# Patient Record
Sex: FEMALE | Race: WHITE | NOT HISPANIC OR LATINO | Employment: UNEMPLOYED | URBAN - METROPOLITAN AREA
[De-identification: names, ages, dates, MRNs, and addresses within clinical notes are randomized per-mention and may not be internally consistent; named-entity substitution may affect disease eponyms.]

---

## 2018-03-31 ENCOUNTER — HOSPITAL ENCOUNTER (INPATIENT)
Facility: HOSPITAL | Age: 53
LOS: 7 days | Discharge: HOME | DRG: 082 | End: 2018-04-07
Attending: EMERGENCY MEDICINE | Admitting: ANESTHESIOLOGY
Payer: MEDICAID

## 2018-03-31 DIAGNOSIS — R45.1 AGITATION REQUIRING SEDATION PROTOCOL: ICD-10-CM

## 2018-03-31 DIAGNOSIS — I10 ESSENTIAL HYPERTENSION: ICD-10-CM

## 2018-03-31 DIAGNOSIS — S06.5XAA ACUTE SUBDURAL HEMATOMA: ICD-10-CM

## 2018-03-31 DIAGNOSIS — I10 HTN (HYPERTENSION): ICD-10-CM

## 2018-03-31 DIAGNOSIS — E03.8 OTHER SPECIFIED HYPOTHYROIDISM: ICD-10-CM

## 2018-03-31 DIAGNOSIS — R56.1 SEIZURE AFTER HEAD INJURY: ICD-10-CM

## 2018-03-31 DIAGNOSIS — I62.9 INTRACRANIAL HEMORRHAGE: ICD-10-CM

## 2018-03-31 DIAGNOSIS — F10.10 ALCOHOL ABUSE: ICD-10-CM

## 2018-03-31 DIAGNOSIS — J96.01 ACUTE RESPIRATORY FAILURE WITH HYPOXIA: ICD-10-CM

## 2018-03-31 DIAGNOSIS — E87.1 HYPONATREMIA: ICD-10-CM

## 2018-03-31 DIAGNOSIS — Z97.8 ENDOTRACHEAL TUBE PRESENT: ICD-10-CM

## 2018-03-31 DIAGNOSIS — A41.9 SEPSIS, DUE TO UNSPECIFIED ORGANISM: ICD-10-CM

## 2018-03-31 DIAGNOSIS — G93.5 BRAIN COMPRESSION: ICD-10-CM

## 2018-03-31 DIAGNOSIS — S06.5XAA SUBDURAL HEMATOMA: Primary | ICD-10-CM

## 2018-03-31 DIAGNOSIS — F10.939 ALCOHOL WITHDRAWAL SYNDROME WITH COMPLICATION: ICD-10-CM

## 2018-03-31 DIAGNOSIS — I62.00 SUBDURAL HEMORRHAGE: ICD-10-CM

## 2018-03-31 PROBLEM — D69.6 THROMBOCYTOPENIA: Status: ACTIVE | Noted: 2018-03-31

## 2018-03-31 LAB
ABO + RH BLD: NORMAL
ALBUMIN SERPL BCP-MCNC: 3.9 G/DL
ALP SERPL-CCNC: 89 U/L
ALT SERPL W/O P-5'-P-CCNC: 17 U/L
AMMONIA PLAS-SCNC: 80 UMOL/L
AMPHET+METHAMPHET UR QL: NEGATIVE
AMPHET+METHAMPHET UR QL: NEGATIVE
ANION GAP SERPL CALC-SCNC: 11 MMOL/L
AST SERPL-CCNC: 39 U/L
BACTERIA #/AREA URNS AUTO: NORMAL /HPF
BARBITURATES UR QL SCN>200 NG/ML: NEGATIVE
BARBITURATES UR QL SCN>200 NG/ML: NEGATIVE
BASOPHILS # BLD AUTO: 0.02 K/UL
BASOPHILS NFR BLD: 0.1 %
BENZODIAZ UR QL SCN>200 NG/ML: NORMAL
BENZODIAZ UR QL SCN>200 NG/ML: NORMAL
BILIRUB SERPL-MCNC: 1.2 MG/DL
BILIRUB UR QL STRIP: NEGATIVE
BLD GP AB SCN CELLS X3 SERPL QL: NORMAL
BUN SERPL-MCNC: 2 MG/DL
BZE UR QL SCN: NEGATIVE
BZE UR QL SCN: NEGATIVE
CALCIUM SERPL-MCNC: 8.2 MG/DL
CANNABINOIDS UR QL SCN: NEGATIVE
CANNABINOIDS UR QL SCN: NEGATIVE
CHLORIDE SERPL-SCNC: 76 MMOL/L
CHLORIDE UR-SCNC: 137 MMOL/L
CLARITY UR REFRACT.AUTO: CLEAR
CO2 SERPL-SCNC: 24 MMOL/L
COLOR UR AUTO: ABNORMAL
CORTIS SERPL-MCNC: 25.7 UG/DL
CREAT SERPL-MCNC: 0.6 MG/DL
CREAT UR-MCNC: 19 MG/DL
CREAT UR-MCNC: 19 MG/DL
DIFFERENTIAL METHOD: ABNORMAL
EOSINOPHIL # BLD AUTO: 0 K/UL
EOSINOPHIL NFR BLD: 0 %
ERYTHROCYTE [DISTWIDTH] IN BLOOD BY AUTOMATED COUNT: 11.4 %
EST. GFR  (AFRICAN AMERICAN): >60 ML/MIN/1.73 M^2
EST. GFR  (NON AFRICAN AMERICAN): >60 ML/MIN/1.73 M^2
ETHANOL SERPL-MCNC: <10 MG/DL
ETHANOL UR-MCNC: <10 MG/DL
GLUCOSE SERPL-MCNC: 136 MG/DL
GLUCOSE UR QL STRIP: ABNORMAL
HCT VFR BLD AUTO: 33.8 %
HGB BLD-MCNC: 12.3 G/DL
HGB UR QL STRIP: ABNORMAL
HYALINE CASTS UR QL AUTO: 0 /LPF
IMM GRANULOCYTES # BLD AUTO: 0.1 K/UL
IMM GRANULOCYTES NFR BLD AUTO: 0.7 %
INR PPP: 1
KETONES UR QL STRIP: NEGATIVE
LACTATE SERPL-SCNC: 1.6 MMOL/L
LEUKOCYTE ESTERASE UR QL STRIP: NEGATIVE
LYMPHOCYTES # BLD AUTO: 0.9 K/UL
LYMPHOCYTES NFR BLD: 6.4 %
MAGNESIUM SERPL-MCNC: 2.1 MG/DL
MCH RBC QN AUTO: 29.9 PG
MCHC RBC AUTO-ENTMCNC: 36.4 G/DL
MCV RBC AUTO: 82 FL
METHADONE UR QL SCN>300 NG/ML: NEGATIVE
METHADONE UR QL SCN>300 NG/ML: NEGATIVE
MICROSCOPIC COMMENT: NORMAL
MONOCYTES # BLD AUTO: 1.2 K/UL
MONOCYTES NFR BLD: 8.1 %
NEUTROPHILS # BLD AUTO: 12.4 K/UL
NEUTROPHILS NFR BLD: 84.7 %
NITRITE UR QL STRIP: NEGATIVE
NRBC BLD-RTO: 0 /100 WBC
OPIATES UR QL SCN: NEGATIVE
OPIATES UR QL SCN: NEGATIVE
OSMOLALITY SERPL: 230 MOSM/KG
OSMOLALITY UR: 387 MOSM/KG
PCP UR QL SCN>25 NG/ML: NEGATIVE
PCP UR QL SCN>25 NG/ML: NEGATIVE
PH UR STRIP: 7 [PH] (ref 5–8)
PHENYTOIN SERPL-MCNC: 8.2 UG/ML
PLATELET # BLD AUTO: 176 K/UL
PMV BLD AUTO: 9.7 FL
POCT GLUCOSE: 116 MG/DL (ref 70–110)
POCT GLUCOSE: 129 MG/DL (ref 70–110)
POTASSIUM SERPL-SCNC: 3.1 MMOL/L
PROT SERPL-MCNC: 6.3 G/DL
PROT UR QL STRIP: ABNORMAL
PROTHROMBIN TIME: 10.6 SEC
RBC # BLD AUTO: 4.12 M/UL
RBC #/AREA URNS AUTO: 1 /HPF (ref 0–4)
SODIUM SERPL-SCNC: 108 MMOL/L
SODIUM SERPL-SCNC: 111 MMOL/L
SODIUM SERPL-SCNC: 112 MMOL/L
SODIUM SERPL-SCNC: 112 MMOL/L
SODIUM SERPL-SCNC: 114 MMOL/L
SODIUM UR-SCNC: 102 MMOL/L
SP GR UR STRIP: 1.01 (ref 1–1.03)
SQUAMOUS #/AREA URNS AUTO: 1 /HPF
TOXICOLOGY INFORMATION: NORMAL
TOXICOLOGY INFORMATION: NORMAL
TSH SERPL DL<=0.005 MIU/L-ACNC: 1.69 UIU/ML
URN SPEC COLLECT METH UR: ABNORMAL
UROBILINOGEN UR STRIP-ACNC: NEGATIVE EU/DL
UUN UR-MCNC: 139 MG/DL
VANCOMYCIN TROUGH SERPL-MCNC: <1.1 UG/ML
WBC # BLD AUTO: 14.63 K/UL
WBC #/AREA URNS AUTO: 1 /HPF (ref 0–5)

## 2018-03-31 PROCEDURE — 63600175 PHARM REV CODE 636 W HCPCS: Performed by: ANESTHESIOLOGY

## 2018-03-31 PROCEDURE — 99292 CRITICAL CARE ADDL 30 MIN: CPT | Mod: ,,, | Performed by: ANESTHESIOLOGY

## 2018-03-31 PROCEDURE — 83605 ASSAY OF LACTIC ACID: CPT

## 2018-03-31 PROCEDURE — 83735 ASSAY OF MAGNESIUM: CPT

## 2018-03-31 PROCEDURE — 20000000 HC ICU ROOM

## 2018-03-31 PROCEDURE — 31500 INSERT EMERGENCY AIRWAY: CPT

## 2018-03-31 PROCEDURE — 99291 CRITICAL CARE FIRST HOUR: CPT | Mod: ,,, | Performed by: ANESTHESIOLOGY

## 2018-03-31 PROCEDURE — 36620 INSERTION CATHETER ARTERY: CPT

## 2018-03-31 PROCEDURE — 96376 TX/PRO/DX INJ SAME DRUG ADON: CPT

## 2018-03-31 PROCEDURE — 80171 DRUG SCREEN QUANT GABAPENTIN: CPT

## 2018-03-31 PROCEDURE — 85025 COMPLETE CBC W/AUTO DIFF WBC: CPT

## 2018-03-31 PROCEDURE — 80053 COMPREHEN METABOLIC PANEL: CPT

## 2018-03-31 PROCEDURE — 99285 EMERGENCY DEPT VISIT HI MDM: CPT

## 2018-03-31 PROCEDURE — 96375 TX/PRO/DX INJ NEW DRUG ADDON: CPT

## 2018-03-31 PROCEDURE — 80320 DRUG SCREEN QUANTALCOHOLS: CPT

## 2018-03-31 PROCEDURE — 85610 PROTHROMBIN TIME: CPT

## 2018-03-31 PROCEDURE — 96365 THER/PROPH/DIAG IV INF INIT: CPT

## 2018-03-31 PROCEDURE — 80185 ASSAY OF PHENYTOIN TOTAL: CPT

## 2018-03-31 PROCEDURE — 51702 INSERT TEMP BLADDER CATH: CPT

## 2018-03-31 PROCEDURE — 99291 CRITICAL CARE FIRST HOUR: CPT | Mod: ,,, | Performed by: EMERGENCY MEDICINE

## 2018-03-31 PROCEDURE — 5A1935Z RESPIRATORY VENTILATION, LESS THAN 24 CONSECUTIVE HOURS: ICD-10-PCS | Performed by: ANESTHESIOLOGY

## 2018-03-31 PROCEDURE — 63600175 PHARM REV CODE 636 W HCPCS: Performed by: STUDENT IN AN ORGANIZED HEALTH CARE EDUCATION/TRAINING PROGRAM

## 2018-03-31 PROCEDURE — 80321 ALCOHOLS BIOMARKERS 1OR 2: CPT

## 2018-03-31 PROCEDURE — 80202 ASSAY OF VANCOMYCIN: CPT

## 2018-03-31 PROCEDURE — 25000003 PHARM REV CODE 250: Performed by: EMERGENCY MEDICINE

## 2018-03-31 PROCEDURE — 82533 TOTAL CORTISOL: CPT

## 2018-03-31 PROCEDURE — 84540 ASSAY OF URINE/UREA-N: CPT

## 2018-03-31 PROCEDURE — 25000003 PHARM REV CODE 250: Performed by: ANESTHESIOLOGY

## 2018-03-31 PROCEDURE — 96367 TX/PROPH/DG ADDL SEQ IV INF: CPT

## 2018-03-31 PROCEDURE — A4216 STERILE WATER/SALINE, 10 ML: HCPCS | Performed by: STUDENT IN AN ORGANIZED HEALTH CARE EDUCATION/TRAINING PROGRAM

## 2018-03-31 PROCEDURE — 99233 SBSQ HOSP IP/OBS HIGH 50: CPT | Mod: ,,, | Performed by: NEUROLOGICAL SURGERY

## 2018-03-31 PROCEDURE — 94761 N-INVAS EAR/PLS OXIMETRY MLT: CPT

## 2018-03-31 PROCEDURE — 25000003 PHARM REV CODE 250: Performed by: PHYSICIAN ASSISTANT

## 2018-03-31 PROCEDURE — 83935 ASSAY OF URINE OSMOLALITY: CPT

## 2018-03-31 PROCEDURE — 80307 DRUG TEST PRSMV CHEM ANLYZR: CPT

## 2018-03-31 PROCEDURE — 36620 INSERTION CATHETER ARTERY: CPT | Mod: ,,, | Performed by: STUDENT IN AN ORGANIZED HEALTH CARE EDUCATION/TRAINING PROGRAM

## 2018-03-31 PROCEDURE — 96366 THER/PROPH/DIAG IV INF ADDON: CPT

## 2018-03-31 PROCEDURE — 81001 URINALYSIS AUTO W/SCOPE: CPT

## 2018-03-31 PROCEDURE — 87040 BLOOD CULTURE FOR BACTERIA: CPT | Mod: 59

## 2018-03-31 PROCEDURE — 25000003 PHARM REV CODE 250: Performed by: STUDENT IN AN ORGANIZED HEALTH CARE EDUCATION/TRAINING PROGRAM

## 2018-03-31 PROCEDURE — 99291 CRITICAL CARE FIRST HOUR: CPT | Mod: 25

## 2018-03-31 PROCEDURE — 86592 SYPHILIS TEST NON-TREP QUAL: CPT

## 2018-03-31 PROCEDURE — 94002 VENT MGMT INPAT INIT DAY: CPT

## 2018-03-31 PROCEDURE — 83930 ASSAY OF BLOOD OSMOLALITY: CPT

## 2018-03-31 PROCEDURE — 86850 RBC ANTIBODY SCREEN: CPT

## 2018-03-31 PROCEDURE — 25000003 PHARM REV CODE 250

## 2018-03-31 PROCEDURE — 27000221 HC OXYGEN, UP TO 24 HOURS

## 2018-03-31 PROCEDURE — 82436 ASSAY OF URINE CHLORIDE: CPT

## 2018-03-31 PROCEDURE — 99900035 HC TECH TIME PER 15 MIN (STAT)

## 2018-03-31 PROCEDURE — C9113 INJ PANTOPRAZOLE SODIUM, VIA: HCPCS | Performed by: STUDENT IN AN ORGANIZED HEALTH CARE EDUCATION/TRAINING PROGRAM

## 2018-03-31 PROCEDURE — 96368 THER/DIAG CONCURRENT INF: CPT

## 2018-03-31 PROCEDURE — 84295 ASSAY OF SERUM SODIUM: CPT | Mod: 91

## 2018-03-31 PROCEDURE — 63600175 PHARM REV CODE 636 W HCPCS: Performed by: EMERGENCY MEDICINE

## 2018-03-31 PROCEDURE — 84295 ASSAY OF SERUM SODIUM: CPT

## 2018-03-31 PROCEDURE — 63600175 PHARM REV CODE 636 W HCPCS: Performed by: PHYSICIAN ASSISTANT

## 2018-03-31 PROCEDURE — 82108 ASSAY OF ALUMINUM: CPT

## 2018-03-31 PROCEDURE — 84300 ASSAY OF URINE SODIUM: CPT

## 2018-03-31 PROCEDURE — 84443 ASSAY THYROID STIM HORMONE: CPT

## 2018-03-31 PROCEDURE — 63600175 PHARM REV CODE 636 W HCPCS: Performed by: NURSE PRACTITIONER

## 2018-03-31 PROCEDURE — 82140 ASSAY OF AMMONIA: CPT

## 2018-03-31 RX ORDER — FAMOTIDINE 10 MG/ML
20 INJECTION INTRAVENOUS 2 TIMES DAILY
Status: DISCONTINUED | OUTPATIENT
Start: 2018-03-31 | End: 2018-03-31

## 2018-03-31 RX ORDER — MIDAZOLAM HYDROCHLORIDE 1 MG/ML
INJECTION INTRAMUSCULAR; INTRAVENOUS
Status: DISPENSED
Start: 2018-03-31 | End: 2018-04-01

## 2018-03-31 RX ORDER — DEXMEDETOMIDINE HYDROCHLORIDE 4 UG/ML
0.2 INJECTION, SOLUTION INTRAVENOUS CONTINUOUS
Status: DISCONTINUED | OUTPATIENT
Start: 2018-03-31 | End: 2018-03-31

## 2018-03-31 RX ORDER — SODIUM CHLORIDE 9 MG/ML
20 INJECTION, SOLUTION INTRAVENOUS CONTINUOUS
Status: DISCONTINUED | OUTPATIENT
Start: 2018-03-31 | End: 2018-04-01

## 2018-03-31 RX ORDER — LEVETIRACETAM 5 MG/ML
500 INJECTION INTRAVASCULAR EVERY 12 HOURS
Status: DISCONTINUED | OUTPATIENT
Start: 2018-03-31 | End: 2018-04-02

## 2018-03-31 RX ORDER — FENTANYL CITRATE 50 UG/ML
25 INJECTION, SOLUTION INTRAMUSCULAR; INTRAVENOUS EVERY 30 MIN PRN
Status: COMPLETED | OUTPATIENT
Start: 2018-03-31 | End: 2018-04-01

## 2018-03-31 RX ORDER — SODIUM CHLORIDE 0.9 % (FLUSH) 0.9 %
3 SYRINGE (ML) INJECTION EVERY 8 HOURS
Status: DISCONTINUED | OUTPATIENT
Start: 2018-03-31 | End: 2018-04-07 | Stop reason: HOSPADM

## 2018-03-31 RX ORDER — VANCOMYCIN/0.9 % SOD CHLORIDE 1 G/100 ML
1000 PLASTIC BAG, INJECTION (ML) INTRAVENOUS
Status: DISCONTINUED | OUTPATIENT
Start: 2018-03-31 | End: 2018-04-01

## 2018-03-31 RX ORDER — PROMETHAZINE HYDROCHLORIDE 25 MG/ML
6.25 INJECTION, SOLUTION INTRAMUSCULAR; INTRAVENOUS ONCE
Status: DISCONTINUED | OUTPATIENT
Start: 2018-03-31 | End: 2018-03-31

## 2018-03-31 RX ORDER — MIDAZOLAM HYDROCHLORIDE 1 MG/ML
2 INJECTION INTRAMUSCULAR; INTRAVENOUS
Status: DISCONTINUED | OUTPATIENT
Start: 2018-03-31 | End: 2018-03-31

## 2018-03-31 RX ORDER — NICARDIPINE HYDROCHLORIDE 0.2 MG/ML
INJECTION INTRAVENOUS
Status: COMPLETED
Start: 2018-03-31 | End: 2018-03-31

## 2018-03-31 RX ORDER — NICARDIPINE HYDROCHLORIDE 0.2 MG/ML
INJECTION INTRAVENOUS
Status: DISPENSED
Start: 2018-03-31 | End: 2018-04-01

## 2018-03-31 RX ORDER — DEXMEDETOMIDINE HYDROCHLORIDE 4 UG/ML
INJECTION, SOLUTION INTRAVENOUS
Status: DISPENSED
Start: 2018-03-31 | End: 2018-04-01

## 2018-03-31 RX ORDER — IBUPROFEN 200 MG
1 TABLET ORAL DAILY
Status: DISCONTINUED | OUTPATIENT
Start: 2018-04-01 | End: 2018-04-07 | Stop reason: HOSPADM

## 2018-03-31 RX ORDER — PROPOFOL 10 MG/ML
INJECTION, EMULSION INTRAVENOUS
Status: DISPENSED
Start: 2018-03-31 | End: 2018-03-31

## 2018-03-31 RX ORDER — PROPOFOL 10 MG/ML
5 INJECTION, EMULSION INTRAVENOUS CONTINUOUS
Status: DISCONTINUED | OUTPATIENT
Start: 2018-03-31 | End: 2018-03-31

## 2018-03-31 RX ORDER — FENTANYL CITRATE 50 UG/ML
50 INJECTION, SOLUTION INTRAMUSCULAR; INTRAVENOUS ONCE
Status: COMPLETED | OUTPATIENT
Start: 2018-03-31 | End: 2018-03-31

## 2018-03-31 RX ORDER — CHLORHEXIDINE GLUCONATE ORAL RINSE 1.2 MG/ML
15 SOLUTION DENTAL 2 TIMES DAILY
Status: DISCONTINUED | OUTPATIENT
Start: 2018-03-31 | End: 2018-04-02

## 2018-03-31 RX ORDER — FENTANYL CITRATE 50 UG/ML
25 INJECTION, SOLUTION INTRAMUSCULAR; INTRAVENOUS
Status: DISCONTINUED | OUTPATIENT
Start: 2018-03-31 | End: 2018-03-31

## 2018-03-31 RX ORDER — CEFTRIAXONE 1 G/1
1 INJECTION, POWDER, FOR SOLUTION INTRAMUSCULAR; INTRAVENOUS
Status: DISCONTINUED | OUTPATIENT
Start: 2018-03-31 | End: 2018-04-02

## 2018-03-31 RX ORDER — MIDAZOLAM HYDROCHLORIDE 1 MG/ML
4 INJECTION INTRAMUSCULAR; INTRAVENOUS ONCE
Status: COMPLETED | OUTPATIENT
Start: 2018-03-31 | End: 2018-03-31

## 2018-03-31 RX ORDER — FENTANYL CITRATE 50 UG/ML
50 INJECTION, SOLUTION INTRAMUSCULAR; INTRAVENOUS
Status: COMPLETED | OUTPATIENT
Start: 2018-03-31 | End: 2018-03-31

## 2018-03-31 RX ORDER — NICARDIPINE HYDROCHLORIDE 0.2 MG/ML
2.5 INJECTION INTRAVENOUS CONTINUOUS
Status: DISCONTINUED | OUTPATIENT
Start: 2018-03-31 | End: 2018-04-01

## 2018-03-31 RX ORDER — PANTOPRAZOLE SODIUM 40 MG/10ML
40 INJECTION, POWDER, LYOPHILIZED, FOR SOLUTION INTRAVENOUS 2 TIMES DAILY
Status: DISCONTINUED | OUTPATIENT
Start: 2018-03-31 | End: 2018-03-31

## 2018-03-31 RX ADMIN — Medication 3 ML: at 10:03

## 2018-03-31 RX ADMIN — FENTANYL CITRATE 50 MCG: 50 INJECTION, SOLUTION INTRAMUSCULAR; INTRAVENOUS at 11:03

## 2018-03-31 RX ADMIN — CHLORHEXIDINE GLUCONATE 15 ML: 1.2 RINSE ORAL at 10:03

## 2018-03-31 RX ADMIN — NICARDIPINE HYDROCHLORIDE 2.5 MG/HR: 0.2 INJECTION INTRAVENOUS at 10:03

## 2018-03-31 RX ADMIN — NICARDIPINE HYDROCHLORIDE 2.5 MG/HR: 0.2 INJECTION, SOLUTION INTRAVENOUS at 10:03

## 2018-03-31 RX ADMIN — FENTANYL CITRATE 25 MCG: 50 INJECTION, SOLUTION INTRAMUSCULAR; INTRAVENOUS at 07:03

## 2018-03-31 RX ADMIN — FOLIC ACID: 5 INJECTION, SOLUTION INTRAMUSCULAR; INTRAVENOUS; SUBCUTANEOUS at 01:03

## 2018-03-31 RX ADMIN — MIDAZOLAM HYDROCHLORIDE 4 MG: 1 INJECTION, SOLUTION INTRAMUSCULAR; INTRAVENOUS at 01:03

## 2018-03-31 RX ADMIN — PROPOFOL 5 MCG/KG/MIN: 10 INJECTION, EMULSION INTRAVENOUS at 10:03

## 2018-03-31 RX ADMIN — PROPOFOL 2.5 MCG/KG/MIN: 10 INJECTION, EMULSION INTRAVENOUS at 10:03

## 2018-03-31 RX ADMIN — LEVETIRACETAM 500 MG: 5 INJECTION INTRAVENOUS at 03:03

## 2018-03-31 RX ADMIN — PANTOPRAZOLE SODIUM 40 MG: 40 INJECTION, POWDER, FOR SOLUTION INTRAVENOUS at 10:03

## 2018-03-31 RX ADMIN — LEVETIRACETAM 500 MG: 5 INJECTION INTRAVENOUS at 10:03

## 2018-03-31 RX ADMIN — SODIUM CHLORIDE 1220 ML: 0.9 INJECTION, SOLUTION INTRAVENOUS at 10:03

## 2018-03-31 RX ADMIN — DEXMEDETOMIDINE HYDROCHLORIDE 0.2 MCG/KG/HR: 4 INJECTION, SOLUTION INTRAVENOUS at 01:03

## 2018-03-31 RX ADMIN — PROMETHAZINE HYDROCHLORIDE 6.25 MG: 25 INJECTION INTRAMUSCULAR; INTRAVENOUS at 10:03

## 2018-03-31 RX ADMIN — SODIUM CHLORIDE 1000 ML: 0.9 INJECTION, SOLUTION INTRAVENOUS at 10:03

## 2018-03-31 NOTE — PROGRESS NOTES
Patient successfully extubated per order from ANGI Wolfe RT at bedside. Patient tolerated extubation well and currently satting 95% on 2L O2 NC.  Will continue to monitor.

## 2018-03-31 NOTE — ED NOTES
Critical Sodium (111) reported to critical care admitting team and JACKSON Lopez at bedside/ states they were already aware of critical sodium

## 2018-03-31 NOTE — PROGRESS NOTES
Pt extubated without parameters per MD order.  Pt placed on 3L nasal cannula.  Pt tolerated well.  Will continue to monitor.

## 2018-03-31 NOTE — SUBJECTIVE & OBJECTIVE
Past Medical History:   Diagnosis Date    Anxiety     Depression     History of psychiatric care     Hypertension     Psychiatric exam     Seizures      Past Surgical History:   Procedure Laterality Date    BACK SURGERY      TONSILLECTOMY        No current facility-administered medications on file prior to encounter.      Current Outpatient Prescriptions on File Prior to Encounter   Medication Sig Dispense Refill    citalopram (CELEXA) 20 MG tablet Take 1 tablet (20 mg total) by mouth once daily. 30 tablet 1    gabapentin (NEURONTIN) 300 MG capsule Take 2 capsules (600 mg total) by mouth 3 (three) times daily. 180 capsule 1    levothyroxine (SYNTHROID) 75 MCG tablet Take 75 mcg by mouth once daily.      liothyronine (CYTOMEL) 25 MCG Tab Take 1 tablet (25 mcg total) by mouth every morning. 30 tablet 1    lisinopril 10 MG tablet Take 1 tablet (10 mg total) by mouth once daily. 30 tablet 1    metoprolol tartrate (LOPRESSOR) 25 MG tablet Take 25 mg by mouth 2 (two) times daily.      mirtazapine (REMERON) 30 MG tablet Take 1 tablet (30 mg total) by mouth every evening. 30 tablet 1    phenytoin (DILANTIN) 100 MG ER capsule Take by mouth 3 (three) times daily.        Allergies: Patient has no known allergies.    History reviewed. No pertinent family history.  Social History   Substance Use Topics    Smoking status: Current Every Day Smoker     Packs/day: 1.00     Years: 10.00    Smokeless tobacco: Not on file    Alcohol use 0.0 oz/week     5 - 10 Cans of beer per week     Review of Systems   Unable to perform ROS: Intubated     Objective:     Vitals:    Pulse: 107  BP: 123/84  MAP (mmHg): 97  Resp: 12  SpO2: 100 %  O2 Device (Oxygen Therapy): ventilator  Vent Mode: A/C  Set Rate: 14 bmp  Vt Set: 450 mL  PEEP/CPAP: 5 cmH20  Peak Airway Pressure: 15 cmH2O  Mean Airway Pressure: 7 cmH20    Pulse  Min: 80  Max: 130  BP  Min: 119/79  Max: 197/93  MAP (mmHg)  Min: 93  Max: 134  Resp  Min: 12  Max: 12  SpO2   Min: 100 %  Max: 100 %    No intake/output data recorded.           Physical Exam   Constitutional: She appears well-developed and well-nourished. She appears distressed. She is sedated and intubated.   HENT:   Head: Normocephalic and atraumatic.   Eyes: Conjunctivae and EOM are normal. Pupils are equal, round, and reactive to light.   Neck: Normal range of motion. Neck supple. No JVD present. No thyromegaly present.   Cardiovascular: Normal rate, regular rhythm, S1 normal and S2 normal.  Exam reveals no gallop and no friction rub.    No murmur heard.  Pulses:       Radial pulses are 2+ on the right side, and 2+ on the left side.   Pulmonary/Chest: Effort normal. She is intubated. She has no wheezes. She has no rhonchi. She has no rales.   Abdominal: Soft. Bowel sounds are normal. She exhibits no distension. There is no tenderness.   Musculoskeletal: Normal range of motion. She exhibits no edema or tenderness.   Neurological: She has normal reflexes.   Sedation weaned prior to exam.    GCS 10T. Follows commands. No obvious cranial nerve deficits. Moves all four extremities spontaneously and on command.     Pupils 4mm and sluggish.       Skin: Skin is warm and dry.   Nursing note and vitals reviewed.    Unable to test orientation, language, memory, judgment, insight, fund of knowledge, tongue protrusion, coordination, gait due to level of consciousness.    Today I personally reviewed pertinent medications, imaging, lab results, notably:

## 2018-03-31 NOTE — NURSING TRANSFER
Nursing Transfer Note      3/31/2018     Transfer From: [facility] >> [id number]    Transfer via stretcher    Transfer with cardiac monitoring, vented    Transported by Aliya Segal RN    Medicines sent: Yes    Chart send with patient: Yes    Notified: N/A at this time    Patient reassessed at: 3/31/2018 1730    Upon arrival to floor: cardiac monitor applied, patient oriented to room, call bell in reach and bed in lowest position, wheels locked, NCC and RT notified of patient's arrival

## 2018-03-31 NOTE — ASSESSMENT & PLAN NOTE
· May be cause of seizures given extremely low value (109) at OSH  · Follow up labs  · q2h sodium checks  · Currently on NS 75cc/hr. Titrate fluids to effect.  · Goal sodium no greater than 115 today.  · Patient reported alcoholic preferring beer as well, may be dilutional due to excess beer intake.

## 2018-03-31 NOTE — CONSULTS
[facility]  Neurosurgery  Consult Note    Consults  Subjective:     Chief Complaint/Reason for Admission: SDH    History of Present Illness: Patient is a 53 year old female with a history of HTN and seizures, noncompliant on Dilantin who presents today with SDH. Reportedly not on anticoagulation. Patient hyponatremic to 110 on arrival.       (Not in a hospital admission)    Review of patient's allergies indicates:  No Known Allergies    Past Medical History:   Diagnosis Date    Anxiety     Depression     History of psychiatric care     Hypertension     Psychiatric exam     Seizures      Past Surgical History:   Procedure Laterality Date    BACK SURGERY      TONSILLECTOMY       Family History     None        Social History Main Topics    Smoking status: Current Every Day Smoker     Packs/day: 1.00     Years: 10.00    Smokeless tobacco: Not on file    Alcohol use 0.0 oz/week     5 - 10 Cans of beer per week    Drug use: No    Sexual activity: No     Review of Systems   Unable to perform ROS: Intubated     Objective:     Weight: 61 kg (134 lb 7.7 oz)  Body mass index is 22.38 kg/m².  Vital Signs (Most Recent):  Pulse: (!) 130 (03/31/18 1108)  Resp: 12 (03/31/18 1035)  BP: (!) 162/87 (03/31/18 1108)  SpO2: 100 % (03/31/18 1108) Vital Signs (24h Range):  Pulse:  [] 130  Resp:  [12] 12  SpO2:  [100 %] 100 %  BP: (154-197)/() 162/87          Vent Mode: A/C  Resp Rate Total:  [29 br/min] 29 br/min  Vt Set:  [450 mL] 450 mL  PEEP/CPAP:  [5 cmH20] 5 cmH20  Mean Airway Pressure:  [7 cmH20] 7 cmH20         Urethral Catheter 03/31/18 1100 (Active)       Neurosurgery Physical Exam     Intubated, awake, alert.  Nods to questioning.  PERRL  Following commands x4.  Abdomen soft.    Significant Labs:  No results for input(s): GLU, NA, K, CL, CO2, BUN, CREATININE, CALCIUM, MG in the last 48 hours.    Recent Labs  Lab 03/31/18  1046   WBC 14.63*   HGB 12.3   HCT 33.8*          Recent  Labs  Lab 03/31/18  1049   INR 1.0     Microbiology Results (last 7 days)     ** No results found for the last 168 hours. **        All pertinent labs from the last 24 hours have been reviewed.    Significant Diagnostics:  I have reviewed all pertinent imaging results/findings within the past 24 hours.    Assessment/Plan:     Acute subdural hematoma    53F PMH presenting with R acute SDH. GCS [id number]    -- Admit to neuro ICU, NCC service.  -- No acute neurosurgical intervention necessary.  -- Will monitor closely for need to evac SDH.  -- Correct hyponatremia per NCC.  -- SBP <160.  -- HOB >30.  -- WTE as tolerated.  -- Neurochecks q1h, call NSGY immediately with decline in exam.  -- Medical management per NCC.     Discussed with Dr Morataya.             Thank you for your consult. I will follow-up with patient. Please contact us if you have any additional questions.    Alen Jain MD  Neurosurgery  [facility]

## 2018-03-31 NOTE — ED TRIAGE NOTES
Caridad CANELO Ortiz, a 53 y.o. female presents to the ED transfer from [facility] for SD post fall from seizure this am @7453      Chief Complaint   Patient presents with    Transfer [facility]     Transfer from [facility] with subdural bleed from seizure this am @ 445. Pt arrived intubated and sedated.      Review of patient's allergies indicates:  No Known Allergies  Past Medical History:   Diagnosis Date    Anxiety     Depression     History of psychiatric care     Hypertension     Psychiatric exam     Seizures

## 2018-03-31 NOTE — HPI
Caridad Ortiz is a 53 y.o. female with PHMx EtOH abuse, depression, hypothyroidism who presents as transfer from [facility] in [facility] for witnessed seizure. She was provided with versed and transported here when workup noted an ICH. In addition she may have a history of seizures based on documentation from OSH and a negative dilantin level was drawn but no dilantin or other AEDs were found in home medications sent along with her. On arrival NSGY was consulted emergently and are currently evaluating the patient.    Of note the patients medications have a bottle of gabapentin and a bottle of tizanidine, both filled at the beginning of the month, that are empty.     HPI is based off of recollections from other providers including EMS and ED providers as well as the medical record due to intubation.

## 2018-03-31 NOTE — ASSESSMENT & PLAN NOTE
· Patient has reported history of prior seizure disorder.  · Dilantin level at OSH negative.  · Follow up repeat  · Will start keppra 500mg IV BID for seizure ppx  · Seizure precautions.  · Alcohol withdrawals are also considered with in the differential given a history of drinking.  · Currently on propofol gtt and precedex.  · In addition patient had an empty bottle of tizanadine and an empty bottle of neurontin among her possessions that were filled beginning of this month.

## 2018-03-31 NOTE — ED NOTES
Bed: 02  Expected date: 3/31/18  Expected time: 9:07 AM  Means of arrival:   Comments:  Intubated Head Bleed

## 2018-03-31 NOTE — PROGRESS NOTES
Pt received from transport team, was intubated at outside facility. Pt placed on vent upon arrival. Pt is coughing a lot, will remain unsedated for now to assess neuro status. Will continue to monitor.

## 2018-03-31 NOTE — ASSESSMENT & PLAN NOTE
· First noted on OSH CT AM of 3/31  · Repeat imaging today appears grossly stable.  · NSGY consulted, recommendations appreciated.  · On cardene gtt with a-line with goal SBP <140.  · Admit to NCC  · q1h neuro checks.

## 2018-03-31 NOTE — CONSULTS
Inpatient consult to Physical Medicine Rehab  Consult performed by: SUAD PEDERSEN  Consult ordered by: ROB MORALES II  Reason for consult: assess rehab needs      Reviewed patient history and current admission.  Rehab team following.  Full consult to follow.    KAAMR Aponte, FNP-C  Physical Medicine & Rehabilitation   03/31/2018  Spectralink: [id number]

## 2018-03-31 NOTE — PT/OT/SLP PROGRESS
Speech Language Pathology      Caridad Ortiz  MRN: [id number]   ED 02/02    Patient not seen today secondary to patient intubated. Orders to be discontinued. Please re-consult Speech Therapy upon extubation and when medically appropriate to complete a clinical bedside swallow evaluation and Zidfyk-Womdblda-Oomywxfnm Evaluation.     MACIEL Evans, CCC-SLP   Pager: [phone]  03/31/2018

## 2018-03-31 NOTE — PROGRESS NOTES
"Admit Date: 3/31/2018    Admit Time: 1730    Temp: 99.7 (oral)    CBG:    Weight: 64.7kg (bed scale)    Height: 5'4" (estimated)    JACKSON Wolfe notified of patient arrival and at bedside  RT Nura at bedside       "

## 2018-03-31 NOTE — PROCEDURES
Caridad Ortiz is a 53 y.o. female patient.    Pulse: 98 (03/31/18 1326)  Resp: 12 (03/31/18 1035)  BP: 121/81 (03/31/18 1326)  SpO2: 100 % (03/31/18 1326)  Weight: 61 kg (134 lb 7.7 oz) (03/31/18 1100)       Arterial Line  Date/Time: 3/31/2018 1:51 PM  Performed by: LILY SANTOYO II  Authorized by: LILY SANTOYO II   Preparation: Patient was prepped and draped in the usual sterile fashion.  Indications: multiple ABGs and hemodynamic monitoring  Location: right radial  Anesthesia: see MAR for details  Patient sedated: yes  Sedation type: deep sedation  (See MAR for exact dosages of medications).  Sedatives: midazolam, fentanyl and propofol  Chan's test normal: yes  Needle gauge: 18  Seldinger technique: Seldinger technique used  Number of attempts: 2  Complications: No  Specimens: No  Implants: No  Post-procedure: dressing applied  Post-procedure CMS: normal  Patient tolerance: Patient tolerated the procedure well with no immediate complications          Lily Santoyo II  3/31/2018

## 2018-03-31 NOTE — ED PROVIDER NOTES
Encounter Date: 3/31/2018    SCRIBE #1 NOTE: I, Talita Lake, am scribing for, and in the presence of, Dr. Rg.       History     Chief Complaint   Patient presents with    Transfer [facility]     Transfer from [facility] with subdural bleed from seizure this am @ 445. Pt arrived intubated and sedated.        The patient is a 53 y.o. female with co-morbidities including:HTN, seizures, depression and anxiety that presents to the ED via flight care as a transfer from [facility] for evaluation of a subdural hematoma and hyponatremia. Patient was intubated due to GCS 7 at previous facility and presents on low propofol infusion for sedation.        The history is provided by medical records.     Review of patient's allergies indicates:  No Known Allergies  Past Medical History:   Diagnosis Date    Anxiety     Depression     History of psychiatric care     Hypertension     Psychiatric exam     Seizures      Past Surgical History:   Procedure Laterality Date    BACK SURGERY      TONSILLECTOMY       History reviewed. No pertinent family history.  Social History   Substance Use Topics    Smoking status: Current Every Day Smoker     Packs/day: 1.00     Years: 10.00    Smokeless tobacco: Not on file    Alcohol use 0.0 oz/week     5 - 10 Cans of beer per week     Review of Systems   Unable to perform ROS: Acuity of condition       Physical Exam     Initial Vitals   BP Pulse Resp Temp SpO2   -- -- -- -- --      MAP       --         Physical Exam    Vitals reviewed.  Constitutional: She appears well-developed and well-nourished.   53 y.o.  woman intubated and ventilated.    HENT:   Head: Normocephalic and atraumatic.   No intraoral swelling noted surrounding ET tube.   Eyes:   Pupils are 4 mm and minimally reactive to light.   Neck: No tracheal deviation present.   Cardiovascular: Intact distal pulses.   Mild tachycardia.   Pulmonary/Chest:   Ventilated, clear breath  sounds noted bilaterally.   Abdominal: Soft. She exhibits no distension.   Musculoskeletal:   Exhibits intermittent spontaneous movement of all extremities.    Neurological:   GCS 6 I   Skin: Skin is warm and dry. No rash noted.         ED Course   Critical Care  Date/Time: 3/31/2018 10:33 AM  Performed by: ARIS CORONADO  Authorized by: ARIS CORONADO   Direct patient critical care time: 15 minutes  Additional history critical care time: 5 minutes  Ordering / reviewing critical care time: 10 minutes  Documentation critical care time: 5 minutes  Consulting other physicians critical care time: 15 minutes  Total critical care time (exclusive of procedural time) : 50 minutes  Critical care was necessary to treat or prevent imminent or life-threatening deterioration of the following conditions: CNS failure or compromise and metabolic crisis.  Critical care was time spent personally by me on the following activities: discussions with consultants, interpretation of cardiac output measurements, evaluation of patient's response to treatment, examination of patient, obtaining history from patient or surrogate, ordering and performing treatments and interventions, ordering and review of laboratory studies, ordering and review of radiographic studies, pulse oximetry, re-evaluation of patient's condition, review of old charts and ventilator management.        Labs Reviewed   CBC W/ AUTO DIFFERENTIAL - Abnormal; Notable for the following:        Result Value    WBC 14.63 (*)     Hematocrit 33.8 (*)     MCHC 36.4 (*)     RDW 11.4 (*)     Immature Granulocytes 0.7 (*)     Gran # (ANC) 12.4 (*)     Immature Grans (Abs) 0.10 (*)     Lymph # 0.9 (*)     Mono # 1.2 (*)     Gran% 84.7 (*)     Lymph% 6.4 (*)     All other components within normal limits   COMPREHENSIVE METABOLIC PANEL - Abnormal; Notable for the following:     Sodium 111 (*)     Potassium 3.1 (*)     Chloride 76 (*)     Glucose 136 (*)     BUN, Bld 2 (*)      Calcium 8.2 (*)     Total Bilirubin 1.2 (*)     All other components within normal limits   URINALYSIS - Abnormal; Notable for the following:     Protein, UA 1+ (*)     Glucose, UA 1+ (*)     Occult Blood UA 2+ (*)     All other components within normal limits    Narrative:     1 CUP OF URINE   SODIUM - Abnormal; Notable for the following:     Sodium 108 (*)     All other components within normal limits   PHENYTOIN LEVEL, TOTAL - Abnormal; Notable for the following:     Phenytoin Lvl 8.2 (*)     All other components within normal limits    Narrative:     ADD ON PER MBBS, MORALES II ORDER [id number] TSH & ORDER [id number]   PHENYTOIN @1205 3/31/18   PROTIME-INR   LACTIC ACID, PLASMA   DRUG SCREEN PANEL, URINE EMERGENCY    Narrative:     1 CUP OF URINE   MAGNESIUM   TOXICOLOGY SCREEN, URINE, RANDOM (COMPLIANCE)    Narrative:     1 CUP OF URINE   TSH   PHENYTOIN LEVEL, TOTAL   URINALYSIS MICROSCOPIC    Narrative:     1 CUP OF URINE   TSH    Narrative:     ADD ON PER MBBS, MORALES II ORDER [id number] TSH & ORDER [id number]   PHENYTOIN @1205 3/31/18   SODIUM   SODIUM   TYPE & SCREEN   ISTAT CHEM8         Imaging Results          CT Head Without Contrast (In process)  Result time 03/31/18 13:27:29               X-Ray Chest AP Portable (Final result)  Result time 03/31/18 12:15:17    Final result by Chucky Chester MD (03/31/18 12:15:17)                 Impression:      As above.      Electronically signed by: Chucky Chester MD  Date:    03/31/2018  Time:    12:15             Narrative:    EXAMINATION:  XR CHEST AP PORTABLE    CLINICAL HISTORY:  intubated;    TECHNIQUE:  Single frontal view of the chest was performed.    COMPARISON:  None    FINDINGS:  Endotracheal tube with its tip 4.7 cm above the brenda.  Enteric tube with its tip just proximal to the gastroesophageal junction.  This should be advanced approximately 9 cm.  No cardiomegaly.  Atherosclerosis of the aortic arch.  Normal pulmonary vasculature.  Lungs are  clear.  Osseous structures are unremarkable.                              Imaging Results          CT Head Without Contrast (Final result)  Result time 03/31/18 13:41:00    Final result by Chucky Chester MD (03/31/18 13:41:00)                 Impression:      Acute right subdural hemorrhage with subjacent mass effect and 0.9 cm of leftward midline shift.  No hydrocephalus.  Recommend short-term follow-up.    Sinus disease, as above.    COMMUNICATION  This critical result was discovered/received at 1325.  The critical information above was relayed directly by me by telephone to Dr. Jeremiah Rg on 03/31/2018 at 13 30.    Electronically signed by resident: Felipe Ascencio  Date:    03/31/2018  Time:    13:27    Electronically signed by: Chucky Chester MD  Date:    03/31/2018  Time:    13:41             Narrative:    EXAMINATION:  CT HEAD WITHOUT CONTRAST    CLINICAL HISTORY:  intracranial hemorrhage;    TECHNIQUE:  Low dose axial CT images obtained throughout the head without intravenous contrast. Sagittal and coronal reconstructions were performed.    COMPARISON:  None.    FINDINGS:  Intracranial compartment:    There is high attenuation fluid diffusely overlying the right cerebral convexity consistent with acute subdural hemorrhage.  Maximal diameter is 0.9 cm on coronal view.  There is mass effect on the subjacent brain parenchyma with some sulcal effacement, and there is 0.4 cm of leftward midline shift.  There is no significant subfalcine herniation.    The brain parenchyma is unremarkable without evidence of hemorrhage, mass, edema, or major vascular distribution infarct.    Skull/extracranial contents (limited evaluation): No calvarial fracture or significant extracalvarial soft tissue injury.  Mastoid air cells are essentially clear.  Mild mucosal thickening of the sphenoid sinus.                               X-Ray Chest AP Portable (Final result)  Result time 03/31/18 12:15:17    Final result by  Chucky Chester MD (03/31/18 12:15:17)                 Impression:      As above.      Electronically signed by: Chucky Chester MD  Date:    03/31/2018  Time:    12:15             Narrative:    EXAMINATION:  XR CHEST AP PORTABLE    CLINICAL HISTORY:  intubated;    TECHNIQUE:  Single frontal view of the chest was performed.    COMPARISON:  None    FINDINGS:  Endotracheal tube with its tip 4.7 cm above the brenda.  Enteric tube with its tip just proximal to the gastroesophageal junction.  This should be advanced approximately 9 cm.  No cardiomegaly.  Atherosclerosis of the aortic arch.  Normal pulmonary vasculature.  Lungs are clear.  Osseous structures are unremarkable.                                     Medical Decision Making:   History:   Old Medical Records: I decided to obtain old medical records.  Differential Diagnosis:   Encephalopathy, intracranial hemorrhage, uncal herniation, and hypertensive urgency.  Clinical Tests:   Lab Tests: Ordered and Reviewed  Radiological Study: Reviewed and Ordered            Scribe Attestation:   Scribe #1: I performed the above scribed service and the documentation accurately describes the services I performed. I attest to the accuracy of the note.    Attending Attestation:             Attending ED Notes:   Patient exhibited improved mental status and responsiveness with propofol holiday and has been urgently evaluated by both neurosurgery and neuro intensive care.  A nicardipine infusion has been initiated to maintain systolic pressures below 140.  Propofol sedation has been reinitiated for patient comfort.  CT scan reveals evidence of subdural hematoma with mild midline shift.  Repeat laboratory findings reveal moderate increase in the serum sodium to 110.  Per recommendations of neurosurgery and neuro ICU, fluid resuscitation with normal saline only has been administered.  The patient has maintained adequate vital signs and oxygenation throughout her ED observation.   She will be admitted to the neuro intensive care unit in serious condition for further therapy and management.             Clinical Impression:   The primary encounter diagnosis was Subdural hematoma. Diagnoses of Intracranial hemorrhage and Hyponatremia were also pertinent to this visit.    Disposition:   Disposition: Admitted  Condition: Serious  Neuro icu                        Jeremiah Rg MD  03/31/18 1409

## 2018-03-31 NOTE — SUBJECTIVE & OBJECTIVE
(Not in a hospital admission)    Review of patient's allergies indicates:  No Known Allergies    Past Medical History:   Diagnosis Date    Anxiety     Depression     History of psychiatric care     Hypertension     Psychiatric exam     Seizures      Past Surgical History:   Procedure Laterality Date    BACK SURGERY      TONSILLECTOMY       Family History     None        Social History Main Topics    Smoking status: Current Every Day Smoker     Packs/day: 1.00     Years: 10.00    Smokeless tobacco: Not on file    Alcohol use 0.0 oz/week     5 - 10 Cans of beer per week    Drug use: No    Sexual activity: No     Review of Systems   Unable to perform ROS: Intubated     Objective:     Weight: 61 kg (134 lb 7.7 oz)  Body mass index is 22.38 kg/m².  Vital Signs (Most Recent):  Pulse: (!) 130 (03/31/18 1108)  Resp: 12 (03/31/18 1035)  BP: (!) 162/87 (03/31/18 1108)  SpO2: 100 % (03/31/18 1108) Vital Signs (24h Range):  Pulse:  [] 130  Resp:  [12] 12  SpO2:  [100 %] 100 %  BP: (154-197)/() 162/87          Vent Mode: A/C  Resp Rate Total:  [29 br/min] 29 br/min  Vt Set:  [450 mL] 450 mL  PEEP/CPAP:  [5 cmH20] 5 cmH20  Mean Airway Pressure:  [7 cmH20] 7 cmH20         Urethral Catheter 03/31/18 1100 (Active)       Neurosurgery Physical Exam     Intubated, awake, alert.  Nods to questioning.  PERRL  Following commands x4.  Abdomen soft.    Significant Labs:  No results for input(s): GLU, NA, K, CL, CO2, BUN, CREATININE, CALCIUM, MG in the last 48 hours.    Recent Labs  Lab 03/31/18  1046   WBC 14.63*   HGB 12.3   HCT 33.8*          Recent Labs  Lab 03/31/18  1049   INR 1.0     Microbiology Results (last 7 days)     ** No results found for the last 168 hours. **        All pertinent labs from the last 24 hours have been reviewed.    Significant Diagnostics:  I have reviewed all pertinent imaging results/findings within the past 24 hours.

## 2018-03-31 NOTE — H&P
[facility]  Neurocritical Care  History & Physical    Admit Date: 3/31/2018  Service Date: 03/31/2018  Length of Stay: 0    Subjective:     Chief Complaint: Acute subdural hematoma    History of Present Illness: Caridad Ortiz is a 53 y.o. female with PHMx EtOH abuse, depression, hypothyroidism who presents as transfer from [facility] in [facility] for witnessed seizure. She was provided with versed and transported here when workup noted an ICH. In addition she may have a history of seizures based on documentation from OSH and a negative dilantin level was drawn but no dilantin or other AEDs were found in home medications sent along with her. On arrival NSGY was consulted emergently and are currently evaluating the patient.    Of note the patients medications have a bottle of gabapentin and a bottle of tizanidine, both filled at the beginning of the month, that are empty.     HPI is based off of recollections from other providers including EMS and ED providers as well as the medical record due to intubation.     Past Medical History:   Diagnosis Date    Anxiety     Depression     History of psychiatric care     Hypertension     Psychiatric exam     Seizures      Past Surgical History:   Procedure Laterality Date    BACK SURGERY      TONSILLECTOMY        No current facility-administered medications on file prior to encounter.      Current Outpatient Prescriptions on File Prior to Encounter   Medication Sig Dispense Refill    citalopram (CELEXA) 20 MG tablet Take 1 tablet (20 mg total) by mouth once daily. 30 tablet 1    gabapentin (NEURONTIN) 300 MG capsule Take 2 capsules (600 mg total) by mouth 3 (three) times daily. 180 capsule 1    levothyroxine (SYNTHROID) 75 MCG tablet Take 75 mcg by mouth once daily.      liothyronine (CYTOMEL) 25 MCG Tab Take 1 tablet (25 mcg total) by mouth every morning. 30 tablet 1    lisinopril 10 MG tablet Take 1 tablet (10 mg total) by  mouth once daily. 30 tablet 1    metoprolol tartrate (LOPRESSOR) 25 MG tablet Take 25 mg by mouth 2 (two) times daily.      mirtazapine (REMERON) 30 MG tablet Take 1 tablet (30 mg total) by mouth every evening. 30 tablet 1    phenytoin (DILANTIN) 100 MG ER capsule Take by mouth 3 (three) times daily.        Allergies: Patient has no known allergies.    History reviewed. No pertinent family history.  Social History   Substance Use Topics    Smoking status: Current Every Day Smoker     Packs/day: 1.00     Years: 10.00    Smokeless tobacco: Not on file    Alcohol use 0.0 oz/week     5 - 10 Cans of beer per week     Review of Systems   Unable to perform ROS: Intubated     Objective:     Vitals:    Pulse: 107  BP: 123/84  MAP (mmHg): 97  Resp: 12  SpO2: 100 %  O2 Device (Oxygen Therapy): ventilator  Vent Mode: A/C  Set Rate: 14 bmp  Vt Set: 450 mL  PEEP/CPAP: 5 cmH20  Peak Airway Pressure: 15 cmH2O  Mean Airway Pressure: 7 cmH20    Pulse  Min: 80  Max: 130  BP  Min: 119/79  Max: 197/93  MAP (mmHg)  Min: 93  Max: 134  Resp  Min: 12  Max: 12  SpO2  Min: 100 %  Max: 100 %    No intake/output data recorded.           Physical Exam   Constitutional: She appears well-developed and well-nourished. She appears distressed. She is sedated and intubated.   HENT:   Head: Normocephalic and atraumatic.   Eyes: Conjunctivae and EOM are normal. Pupils are equal, round, and reactive to light.   Neck: Normal range of motion. Neck supple. No JVD present. No thyromegaly present.   Cardiovascular: Normal rate, regular rhythm, S1 normal and S2 normal.  Exam reveals no gallop and no friction rub.    No murmur heard.  Pulses:       Radial pulses are 2+ on the right side, and 2+ on the left side.   Pulmonary/Chest: Effort normal. She is intubated. She has no wheezes. She has no rhonchi. She has no rales.   Abdominal: Soft. Bowel sounds are normal. She exhibits no distension. There is no tenderness.   Musculoskeletal: Normal range of  motion. She exhibits no edema or tenderness.   Neurological: She has normal reflexes.   Sedation weaned prior to exam.    GCS 10T. Follows commands. No obvious cranial nerve deficits. Moves all four extremities spontaneously and on command.     Pupils 4mm and sluggish.       Skin: Skin is warm and dry.   Nursing note and vitals reviewed.    Unable to test orientation, language, memory, judgment, insight, fund of knowledge, tongue protrusion, coordination, gait due to level of consciousness.    Today I personally reviewed pertinent medications, imaging, lab results, notably:        Assessment/Plan:     Neuro   * Acute subdural hematoma    · First noted on OSH CT AM of 3/31  · Repeat imaging today appears grossly stable.  · NSGY consulted, recommendations appreciated.  · On cardene gtt with a-line with goal SBP <140.  · Admit to NCC  · q1h neuro checks.        Brain compression    · F/u q2h sodium checks  · q1h neuro exams        Seizure after head injury    · Patient has reported history of prior seizure disorder.  · Dilantin level at OSH negative.  · Follow up repeat  · Will start keppra 500mg IV BID for seizure ppx  · Seizure precautions.  · Alcohol withdrawals are also considered with in the differential given a history of drinking.  · Currently on propofol gtt and precedex.  · In addition patient had an empty bottle of tizanadine and an empty bottle of neurontin among her possessions that were filled beginning of this month.         Psychiatric   Alcohol withdrawal syndrome with complication    · See seizures.        Alcohol abuse    · See seizures        Pulmonary   Acute respiratory failure with hypoxia    · Intubated for airway protection @ OSH  · On minimal settings on transfer.  · Daily SBT.        Renal/   Hyponatremia    · May be cause of seizures given extremely low value (109) at OSH  · Follow up labs  · q2h sodium checks  · Currently on NS 75cc/hr. Titrate fluids to effect.  · Goal sodium no greater  than 115 today.  · Patient reported alcoholic preferring beer as well, may be dilutional due to excess beer intake.             Prophylaxis:  Venous Thromboembolism: mechanical  Stress Ulcer: PPI  Ventilator Pneumonia: yes     Activity Orders          Up with assistance starting at 03/31 1345    Out of bed to chair up to 3x daily starting at 03/31 1300        Full Code    CARLENE Alston II  Neurocritical Care  [facility]

## 2018-03-31 NOTE — HPI
Patient is a 53 year old female with a history of chronic hyponatremia (over 10 years), HTN, smoking, ETOH use, and seizures, noncompliant on Dilantin who presents today with SDH. Reportedly not on anticoagulation. Patient hyponatremic to 110 on arrival.

## 2018-03-31 NOTE — ASSESSMENT & PLAN NOTE
53F PMH presenting with R acute SDH. GCS E4VTM6    -- Admit to neuro ICU, NCC service.  -- No acute neurosurgical intervention necessary.  -- Will monitor closely for need to evac SDH.  -- Correct hyponatremia per NCC.  -- SBP <160.  -- HOB >30.  -- WTE as tolerated.  -- Neurochecks q1h, call NSGY immediately with decline in exam.  -- Medical management per NCC.     Discussed with Dr Morataya.

## 2018-03-31 NOTE — PROGRESS NOTES
Patient arrived with scattered bruises and lacerations from head to toe. I asked the patient and she stated that she does not remember what happened last night or where she was. Her left thumb nail is green and she states that it is a fungus from her acrylic nails. She was covered in dirt and her feet were black. She was bathed, placed on new sheets with a new gown and is now resting comfortably. Cardene infusion stopped at this time. Patient continues to receive Banana bag infusion. Bed locked, wheels in lowest position, call light in reach, patient instructed to call for assistance. Will continue to monitor.

## 2018-04-01 LAB
ALBUMIN SERPL BCP-MCNC: 3.2 G/DL
ALP SERPL-CCNC: 74 U/L
ALT SERPL W/O P-5'-P-CCNC: 15 U/L
ANION GAP SERPL CALC-SCNC: 7 MMOL/L
APTT BLDCRRT: 24.4 SEC
AST SERPL-CCNC: 29 U/L
BASOPHILS # BLD AUTO: 0 K/UL
BASOPHILS NFR BLD: 0 %
BILIRUB SERPL-MCNC: 0.7 MG/DL
BUN SERPL-MCNC: 2 MG/DL
BUN SERPL-MCNC: <3 MG/DL (ref 6–30)
CALCIUM SERPL-MCNC: 7.8 MG/DL
CHLORIDE SERPL-SCNC: 73 MMOL/L (ref 95–110)
CHLORIDE SERPL-SCNC: 87 MMOL/L
CHOLEST SERPL-MCNC: 121 MG/DL
CHOLEST/HDLC SERPL: 1.5 {RATIO}
CO2 SERPL-SCNC: 25 MMOL/L
CREAT SERPL-MCNC: 0.3 MG/DL (ref 0.5–1.4)
CREAT SERPL-MCNC: 0.5 MG/DL
DIFFERENTIAL METHOD: ABNORMAL
EOSINOPHIL # BLD AUTO: 0 K/UL
EOSINOPHIL NFR BLD: 0 %
ERYTHROCYTE [DISTWIDTH] IN BLOOD BY AUTOMATED COUNT: 11.3 %
EST. GFR  (AFRICAN AMERICAN): >60 ML/MIN/1.73 M^2
EST. GFR  (NON AFRICAN AMERICAN): >60 ML/MIN/1.73 M^2
ESTIMATED AVG GLUCOSE: 88 MG/DL
GLUCOSE SERPL-MCNC: 123 MG/DL
GLUCOSE SERPL-MCNC: 138 MG/DL (ref 70–110)
HBA1C MFR BLD HPLC: 4.7 %
HCT VFR BLD AUTO: 32 %
HCT VFR BLD CALC: 41 %PCV (ref 36–54)
HDLC SERPL-MCNC: 79 MG/DL
HDLC SERPL: 65.3 %
HGB BLD-MCNC: 11.6 G/DL
IMM GRANULOCYTES # BLD AUTO: 0.02 K/UL
IMM GRANULOCYTES NFR BLD AUTO: 0.2 %
INR PPP: 0.9
LDLC SERPL CALC-MCNC: 27.6 MG/DL
LYMPHOCYTES # BLD AUTO: 0.7 K/UL
LYMPHOCYTES NFR BLD: 7.5 %
MAGNESIUM SERPL-MCNC: 2.3 MG/DL
MCH RBC QN AUTO: 29.6 PG
MCHC RBC AUTO-ENTMCNC: 36.3 G/DL
MCV RBC AUTO: 82 FL
MONOCYTES # BLD AUTO: 0.4 K/UL
MONOCYTES NFR BLD: 4.7 %
NEUTROPHILS # BLD AUTO: 7.9 K/UL
NEUTROPHILS NFR BLD: 87.6 %
NONHDLC SERPL-MCNC: 42 MG/DL
NRBC BLD-RTO: 0 /100 WBC
PHOSPHATE SERPL-MCNC: 1.1 MG/DL
PHOSPHATE SERPL-MCNC: 1.4 MG/DL
PLATELET # BLD AUTO: 172 K/UL
PMV BLD AUTO: 10.7 FL
POC IONIZED CALCIUM: 0.98 MMOL/L (ref 1.06–1.42)
POC TCO2 (MEASURED): 27 MMOL/L (ref 23–29)
POCT GLUCOSE: 115 MG/DL (ref 70–110)
POTASSIUM BLD-SCNC: 2.9 MMOL/L (ref 3.5–5.1)
POTASSIUM SERPL-SCNC: 2.6 MMOL/L
PROT SERPL-MCNC: 5.5 G/DL
PROTHROMBIN TIME: 9.7 SEC
RBC # BLD AUTO: 3.92 M/UL
SAMPLE: ABNORMAL
SODIUM BLD-SCNC: 110 MMOL/L (ref 136–145)
SODIUM SERPL-SCNC: 117 MMOL/L
SODIUM SERPL-SCNC: 119 MMOL/L
SODIUM SERPL-SCNC: 119 MMOL/L
SODIUM SERPL-SCNC: 124 MMOL/L
SODIUM SERPL-SCNC: 124 MMOL/L
SODIUM SERPL-SCNC: 126 MMOL/L
SODIUM SERPL-SCNC: 127 MMOL/L
TRIGL SERPL-MCNC: 72 MG/DL
WBC # BLD AUTO: 9.07 K/UL

## 2018-04-01 PROCEDURE — 20000000 HC ICU ROOM

## 2018-04-01 PROCEDURE — 84100 ASSAY OF PHOSPHORUS: CPT

## 2018-04-01 PROCEDURE — 99291 CRITICAL CARE FIRST HOUR: CPT | Mod: ,,, | Performed by: ANESTHESIOLOGY

## 2018-04-01 PROCEDURE — 25000003 PHARM REV CODE 250: Performed by: STUDENT IN AN ORGANIZED HEALTH CARE EDUCATION/TRAINING PROGRAM

## 2018-04-01 PROCEDURE — 63600175 PHARM REV CODE 636 W HCPCS: Performed by: STUDENT IN AN ORGANIZED HEALTH CARE EDUCATION/TRAINING PROGRAM

## 2018-04-01 PROCEDURE — A4216 STERILE WATER/SALINE, 10 ML: HCPCS | Performed by: STUDENT IN AN ORGANIZED HEALTH CARE EDUCATION/TRAINING PROGRAM

## 2018-04-01 PROCEDURE — S4991 NICOTINE PATCH NONLEGEND: HCPCS | Performed by: ANESTHESIOLOGY

## 2018-04-01 PROCEDURE — 85610 PROTHROMBIN TIME: CPT

## 2018-04-01 PROCEDURE — 80061 LIPID PANEL: CPT

## 2018-04-01 PROCEDURE — 80053 COMPREHEN METABOLIC PANEL: CPT

## 2018-04-01 PROCEDURE — 63600175 PHARM REV CODE 636 W HCPCS: Performed by: ANESTHESIOLOGY

## 2018-04-01 PROCEDURE — 25000003 PHARM REV CODE 250: Performed by: ANESTHESIOLOGY

## 2018-04-01 PROCEDURE — 85025 COMPLETE CBC W/AUTO DIFF WBC: CPT

## 2018-04-01 PROCEDURE — 84100 ASSAY OF PHOSPHORUS: CPT | Mod: 91

## 2018-04-01 PROCEDURE — 83036 HEMOGLOBIN GLYCOSYLATED A1C: CPT

## 2018-04-01 PROCEDURE — 85730 THROMBOPLASTIN TIME PARTIAL: CPT

## 2018-04-01 PROCEDURE — 84295 ASSAY OF SERUM SODIUM: CPT

## 2018-04-01 PROCEDURE — 27000221 HC OXYGEN, UP TO 24 HOURS

## 2018-04-01 PROCEDURE — 83735 ASSAY OF MAGNESIUM: CPT

## 2018-04-01 PROCEDURE — 84295 ASSAY OF SERUM SODIUM: CPT | Mod: 91

## 2018-04-01 PROCEDURE — C9113 INJ PANTOPRAZOLE SODIUM, VIA: HCPCS | Performed by: STUDENT IN AN ORGANIZED HEALTH CARE EDUCATION/TRAINING PROGRAM

## 2018-04-01 RX ORDER — LANOLIN ALCOHOL/MO/W.PET/CERES
800 CREAM (GRAM) TOPICAL
Status: DISCONTINUED | OUTPATIENT
Start: 2018-04-01 | End: 2018-04-02

## 2018-04-01 RX ORDER — AMLODIPINE BESYLATE 10 MG/1
10 TABLET ORAL DAILY
COMMUNITY

## 2018-04-01 RX ORDER — SODIUM,POTASSIUM PHOSPHATES 280-250MG
2 POWDER IN PACKET (EA) ORAL
Status: DISCONTINUED | OUTPATIENT
Start: 2018-04-01 | End: 2018-04-02

## 2018-04-01 RX ORDER — BUSPIRONE HYDROCHLORIDE 15 MG/1
15 TABLET ORAL 2 TIMES DAILY
COMMUNITY

## 2018-04-01 RX ORDER — POTASSIUM CHLORIDE 20 MEQ/15ML
40 SOLUTION ORAL
Status: DISCONTINUED | OUTPATIENT
Start: 2018-04-01 | End: 2018-04-02

## 2018-04-01 RX ORDER — TIZANIDINE 4 MG/1
4 TABLET ORAL 2 TIMES DAILY
COMMUNITY

## 2018-04-01 RX ORDER — GABAPENTIN 800 MG/1
800 TABLET ORAL 3 TIMES DAILY
Status: ON HOLD | COMMUNITY
End: 2018-04-07 | Stop reason: HOSPADM

## 2018-04-01 RX ORDER — PROMETHAZINE HYDROCHLORIDE 12.5 MG/1
12.5 TABLET ORAL EVERY 6 HOURS PRN
Status: DISCONTINUED | OUTPATIENT
Start: 2018-04-01 | End: 2018-04-02

## 2018-04-01 RX ORDER — OXYCODONE AND ACETAMINOPHEN 10; 325 MG/1; MG/1
1 TABLET ORAL EVERY 6 HOURS PRN
Status: DISCONTINUED | OUTPATIENT
Start: 2018-04-01 | End: 2018-04-04

## 2018-04-01 RX ORDER — OXYCODONE AND ACETAMINOPHEN 5; 325 MG/1; MG/1
1 TABLET ORAL ONCE
Status: COMPLETED | OUTPATIENT
Start: 2018-04-01 | End: 2018-04-01

## 2018-04-01 RX ORDER — FERROUS SULFATE, DRIED 160(50) MG
1 TABLET, EXTENDED RELEASE ORAL 2 TIMES DAILY WITH MEALS
COMMUNITY

## 2018-04-01 RX ORDER — POTASSIUM CHLORIDE 20 MEQ/15ML
60 SOLUTION ORAL
Status: DISCONTINUED | OUTPATIENT
Start: 2018-04-01 | End: 2018-04-02

## 2018-04-01 RX ADMIN — LEVETIRACETAM 500 MG: 5 INJECTION INTRAVENOUS at 09:04

## 2018-04-01 RX ADMIN — Medication 3 ML: at 05:04

## 2018-04-01 RX ADMIN — POTASSIUM & SODIUM PHOSPHATES POWDER PACK 280-160-250 MG 2 PACKET: 280-160-250 PACK at 09:04

## 2018-04-01 RX ADMIN — THIAMINE HYDROCHLORIDE 100 MG: 100 INJECTION, SOLUTION INTRAMUSCULAR; INTRAVENOUS at 09:04

## 2018-04-01 RX ADMIN — FENTANYL CITRATE 25 MCG: 50 INJECTION, SOLUTION INTRAMUSCULAR; INTRAVENOUS at 12:04

## 2018-04-01 RX ADMIN — NICOTINE 1 PATCH: 21 PATCH, EXTENDED RELEASE TRANSDERMAL at 08:04

## 2018-04-01 RX ADMIN — LEVETIRACETAM 500 MG: 5 INJECTION INTRAVENOUS at 08:04

## 2018-04-01 RX ADMIN — PANTOPRAZOLE SODIUM 40 MG: 40 INJECTION, POWDER, FOR SOLUTION INTRAVENOUS at 09:04

## 2018-04-01 RX ADMIN — PANTOPRAZOLE SODIUM 40 MG: 40 INJECTION, POWDER, FOR SOLUTION INTRAVENOUS at 08:04

## 2018-04-01 RX ADMIN — POTASSIUM CHLORIDE 60 MEQ: 20 SOLUTION ORAL at 08:04

## 2018-04-01 RX ADMIN — FENTANYL CITRATE 25 MCG: 50 INJECTION, SOLUTION INTRAMUSCULAR; INTRAVENOUS at 03:04

## 2018-04-01 RX ADMIN — Medication 1 G: at 12:04

## 2018-04-01 RX ADMIN — CEFTRIAXONE SODIUM 1 G: 1 INJECTION, POWDER, FOR SOLUTION INTRAMUSCULAR; INTRAVENOUS at 12:04

## 2018-04-01 RX ADMIN — Medication 3 ML: at 09:04

## 2018-04-01 RX ADMIN — POTASSIUM & SODIUM PHOSPHATES POWDER PACK 280-160-250 MG 2 PACKET: 280-160-250 PACK at 01:04

## 2018-04-01 RX ADMIN — POTASSIUM CHLORIDE 60 MEQ: 20 SOLUTION ORAL at 05:04

## 2018-04-01 RX ADMIN — POTASSIUM & SODIUM PHOSPHATES POWDER PACK 280-160-250 MG 2 PACKET: 280-160-250 PACK at 05:04

## 2018-04-01 RX ADMIN — OXYCODONE HYDROCHLORIDE AND ACETAMINOPHEN 1 TABLET: 5; 325 TABLET ORAL at 06:04

## 2018-04-01 RX ADMIN — OXYCODONE AND ACETAMINOPHEN 1 TABLET: 10; 325 TABLET ORAL at 01:04

## 2018-04-01 RX ADMIN — CHLORHEXIDINE GLUCONATE 15 ML: 1.2 RINSE ORAL at 08:04

## 2018-04-01 RX ADMIN — OXYCODONE AND ACETAMINOPHEN 1 TABLET: 10; 325 TABLET ORAL at 08:04

## 2018-04-01 RX ADMIN — PROMETHAZINE HYDROCHLORIDE 12.5 MG: 12.5 TABLET ORAL at 06:04

## 2018-04-01 NOTE — PROGRESS NOTES
[facility]  Neurosurgery  Progress Note    Subjective:     History of Present Illness: Patient is a 53 year old female with a history of HTN and seizures, noncompliant on Dilantin who presents today with SDH. Reportedly not on anticoagulation. Patient hyponatremic to 110 on arrival.     Post-Op Info:  * No surgery found *         Interval History: Extubated    Medications:  Continuous Infusions:   sodium chloride 0.9% 1,220 mL (04/01/18 0600)    niCARdipine Stopped (03/31/18 1800)     Scheduled Meds:   cefTRIAXone (ROCEPHIN) IVPB  1 g Intravenous Q12H    chlorhexidine  15 mL Mouth/Throat BID    levetiracetam IVPB  500 mg Intravenous Q12H    nicotine  1 patch Transdermal Daily    pantoprozole (PROTONIX) 40 mg/100 mL D5W IVPB  40 mg Intravenous Q12H    sodium chloride 0.9%  3 mL Intravenous Q8H    thiamine (VITAMIN B1) IVPB  100 mg Intravenous Daily    vancomycin (VANCOCIN) IVPB  1,000 mg Intravenous Q12H     PRN Meds:magnesium oxide, magnesium oxide, potassium chloride 10%, potassium chloride 10%, potassium chloride 10%, potassium, sodium phosphates, potassium, sodium phosphates, potassium, sodium phosphates, promethazine     Review of Systems  Objective:     Weight: 64.7 kg (142 lb 10.2 oz)  Body mass index is 24.48 kg/m².  Vital Signs (Most Recent):  Temp: 99.5 °F (37.5 °C) (04/01/18 0701)  Pulse: 91 (04/01/18 1100)  Resp: (!) 25 (04/01/18 1100)  BP: (!) 143/91 (04/01/18 1100)  SpO2: 96 % (04/01/18 1100) Vital Signs (24h Range):  Temp:  [99 °F (37.2 °C)-99.5 °F (37.5 °C)] 99.5 °F (37.5 °C)  Pulse:  [] 91  Resp:  [16-36] 25  SpO2:  [86 %-100 %] 96 %  BP: ()/() 143/91  Arterial Line BP: (110-151)/(64-81) 146/79       Date 04/01/18 0700 - 04/02/18 0659   Shift 2359-8224 5941-3913 4746-5094 24 Hour Total   I  N  T  A  K  E   I.V.  (mL/kg) 4428.3  (68.4)   4428.3  (68.4)    IV Piggyback 150   150    Shift Total  (mL/kg) 4578.3  (70.8)   4578.3  (70.8)   O  U  T  P  U  T    Urine  (mL/kg/hr) 500   500    Shift Total  (mL/kg) 500  (7.7)   500  (7.7)   Weight (kg) 64.7 64.7 64.7 64.7              Vent Mode: A/C  Oxygen Concentration (%):  [3] 3  Resp Rate Total:  [27 br/min] 27 br/min  Vt Set:  [450 mL] 450 mL  PEEP/CPAP:  [5 cmH20] 5 cmH20  Mean Airway Pressure:  [4 cmH20] 4 cmH20         Neurosurgery Physical Exam    AAOx3, PERRL, EOMI, FS, TM, 5/5 throughout, SILT.  DTR 2+ throughout, no Silveira's, no clonus.     Significant Labs:    Recent Labs  Lab 03/31/18  1046  04/01/18  0049 04/01/18  0407 04/01/18  1208   *  --   --  123*  --    *  < > 117* 119*  119* 124*  124*   K 3.1*  --   --  2.6*  --    CL 76*  --   --  87*  --    CO2 24  --   --  25  --    BUN 2*  --   --  2*  --    CREATININE 0.6  --   --  0.5  --    CALCIUM 8.2*  --   --  7.8*  --    MG 2.1  --   --  2.3  --    < > = values in this interval not displayed.    Recent Labs  Lab 03/31/18  1046 03/31/18  1046 04/01/18  0407   WBC 14.63*  --  9.07   HGB 12.3  --  11.6*   HCT 33.8* 41 32.0*     --  172       Recent Labs  Lab 03/31/18  1049 04/01/18  0407   INR 1.0 0.9   APTT  --  24.4     Microbiology Results (last 7 days)     Procedure Component Value Units Date/Time    Blood culture [[id number]] Collected:  03/31/18 2112    Order Status:  Completed Specimen:  Blood from Peripheral, Antecubital, Left Updated:  04/01/18 0915     Blood Culture, Routine No Growth to date    Narrative:       Blood cultures from 2 different sites. 4 bottles total.  Please draw before starting antibiotics.    Blood culture [[id number]] Collected:  03/31/18 2250    Order Status:  Completed Specimen:  Blood from Peripheral, Antecubital, Right Updated:  04/01/18 0915     Blood Culture, Routine No Growth to date    Narrative:       Blood cultures x 2 different sites. 4 bottles total. Please  draw cultures before administering antibiotics.    Blood culture [[id number]] Collected:  03/31/18 1533    Order Status:  Completed Specimen:   Blood from Line, Arterial, Right Updated:  04/01/18 0115     Blood Culture, Routine No Growth to date    Blood Culture #2 **CANNOT BE ORDERED STAT** [[id number]] Collected:  03/31/18 1533    Order Status:  Completed Specimen:  Blood from Peripheral, Forearm, Right Updated:  04/01/18 0115     Blood Culture, Routine No Growth to date    Culture, Respiratory with Gram Stain [[id number]]     Order Status:  No result Specimen:  Respiratory from Endotracheal Aspirate         All pertinent labs from the last 24 hours have been reviewed.    Significant Diagnostics:  CT: Ct Head Without Contrast    Result Date: 4/1/2018  Stable to slightly improved subdural hemorrhage overlying the right cerebral convexity with stable mass effect and leftward midline shift of approximately 0.3 cm. Sphenoid sinus disease. Electronically signed by resident: Tristan Berrios Date:    04/01/2018 Time:    05:30 Electronically signed by: Hai Javed MD Date:    04/01/2018 Time:    05:53    Ct Head Without Contrast    Result Date: 3/31/2018  Acute right subdural hemorrhage with subjacent mass effect and 0.9 cm of leftward midline shift.  No hydrocephalus.  Recommend short-term follow-up. Sinus disease, as above. COMMUNICATION This critical result was discovered/received at 1325.  The critical information above was relayed directly by me by telephone to Dr. Jeremiah Rg on 03/31/2018 at 13 30. Electronically signed by resident: Felipe Ascencio Date:    03/31/2018 Time:    13:27 Electronically signed by: Chucky Chester MD Date:    03/31/2018 Time:    13:41    Assessment/Plan:     * Acute subdural hematoma    53F PMH presenting with R acute SDH. GCS E4VTM6    -- Will monitor closely for need to evac SDH.  -- Correct hyponatremia per NCC.  -- SBP <160.  -- HOB >30.  -- Neurochecks q1h, call NSGY immediately with decline in exam.  -- Medical management per NCC.             Alen Jain MD  Neurosurgery  [facility]

## 2018-04-01 NOTE — PROGRESS NOTES
Pt transported to 2nd floor CT for head scan. Pt transported via bed with O2 and cardiac monitoring. No acute events during scan or travel. Pt returned to 7089 at 0509. RN to continue to monitor.

## 2018-04-01 NOTE — PT/OT/SLP PROGRESS
Physical Therapy      Patient Name:  Caridad Ortiz   MRN:  [id number]    Orders received for PT evaluation and treatment. Pt not seen 2/2 pt unwilling to participate. Pt refusing therapy evaluation due to 10/10 head and back pain. Per RN, pt pre-medicated for pain. Therapist educated pt on role of PT and encouraged pt to participate in activity up to tolerance; pt verbalized understanding but continued to decline evaluation. Will follow up tomorrow.     Vicky Velasquez, PT, DPT   4/1/2018  Pager: [phone]

## 2018-04-01 NOTE — ASSESSMENT & PLAN NOTE
53F PMH presenting with R acute SDH. GCS E4VTM6    -- Will monitor closely for need to evac SDH.  -- Correct hyponatremia per NCC.  -- SBP <160.  -- HOB >30.  -- Neurochecks q1h, call NSGY immediately with decline in exam.  -- Medical management per NCC.

## 2018-04-01 NOTE — CONSULTS
"  [facility]  Adult Nutrition  Consult Note    SUMMARY     Recommendations    Recommendation/Intervention:   1. When medically able, advance diet as tolerated to regular with texture per SLP.   2. If unable to advance diet, please re-consult for TF recommendations.   3. RD following.    Goals: Advancement of diet by RD follow up  Nutrition Goal Status: new  Communication of RD Recs:  (POC)    Reason for Assessment    Reason for Assessment: consult  Diagnosis: stroke/CVA  Relevant Medical History: HTN, EtOH abuse, depression, hypothyroidism, seizures    General Information Comments: Pt extubated 3/31. Remains NPO with no SLP eval completed. Pt unavailable at time of visit.    Nutrition Discharge Planning: Unable to determine at this time.    Nutrition/Diet History    Patient Reported Diet/Restrictions/Preferences:  (HARVINDER)  Food Preferences: HARVINDER cultural/[employer] preferences.  Factors Affecting Nutritional Intake: NPO    Anthropometrics    Temp: 99 °F (37.2 °C)  Height Method: Stated  Height: 5' 4" (162.6 cm)  Height (inches): 64 in  Weight Method: Bed Scale  Weight: 64.7 kg (142 lb 10.2 oz)  Weight (lb): 142.64 lb  Ideal Body Weight (IBW), Female: 120 lb  % Ideal Body Weight, Female (lb): 118.87 lb  BMI (Calculated): 24.5  BMI Grade: 18.5-24.9 - normal  Usual Body Weight (UBW), kg:  (Stable PTA per chart review)    Lab/Procedures/Meds    Pertinent Labs Reviewed: reviewed  Pertinent Labs Comments: Na 119, K 2.6, Glu 123, Phos 1.1, Alb 3.2  Pertinent Medications Reviewed: reviewed  Pertinent Medications Comments: pantoprazole, promethazine, IVF with MVI, thiamine, nicardipine    Physical Findings/Assessment    Overall Physical Appearance: nourished  Oral/Mouth Cavity: tooth/teeth missing  Skin: intact    Estimated/Assessed Needs    Weight Used For Calorie Calculations: 64.7 kg (142 lb 10.2 oz)  Height: 5' 4" (162.6 cm)  Energy Calorie Requirements (kcal): 6718-4731  Energy Need Method: Kcal/kg " (25-30)  RMR ([place]-St. Jeor Equation): 1227  Protein Requirements: 65-78 gm (1.0-1.2 gm/kg)  Weight Used For Protein Calculations: 64.7 kg (142 lb 10.2 oz)  Fluid Need Method: RDA Method (1 ml/kcal or per MD)  RDA Method (mL): 1618     Nutrition Prescription Ordered    Current Diet Order: NPO    Evaluation of Received Nutrient/Fluid Intake    IV Fluid (mL): 1271  I/O: -703ml; good UOP  % Intake of Estimated Energy Needs: 0 - 25 %  % Meal Intake: NPO    Nutrition Risk    Level of Risk/Frequency of Follow-up:  (F/u 2x weekly)     Assessment and Plan    * Acute subdural hematoma    Contributing Nutrition Diagnosis  Inadequate energy intake    Related to (etiology):   Inability to consume sufficient energy    Signs and Symptoms (as evidenced by):   NPO with no alternate means for nutrition     Interventions/Recommendations (treatment strategy):  See recs.    Nutrition Diagnosis Status:   New             Monitor and Evaluation    Food and Nutrient Intake: energy intake, food and beverage intake  Food and Nutrient Adminstration: diet order  Physical Activity and Function: nutrition-related ADLs and IADLs  Anthropometric Measurements: weight, weight change, body mass index  Biochemical Data, Medical Tests and Procedures: electrolyte and renal panel, gastrointestinal profile, glucose/endocrine profile, inflammatory profile  Nutrition-Focused Physical Findings: overall appearance     Nutrition Follow-Up    RD Follow-up?: Yes

## 2018-04-01 NOTE — PLAN OF CARE
Problem: Patient Care Overview  Goal: Plan of Care Review  POC reviewed with pt at 0600. Pt verbalized understanding. Questions and concerns addressed. No acute events overnight.  Pt transported to CT for scheduled scan this AM. PRN fentanyl given per order per pt report of pain. Phenergan administered per order for nausea. Pt continue to be hyponatremic. Potassium and phos replaced per PRN order. NRB applied briefly 2x overnight d/t desat ~85%.    Pt progressing toward goals. On coming RN will continue to monitor. See flowsheets for full assessment and VS info

## 2018-04-01 NOTE — ASSESSMENT & PLAN NOTE
Contributing Nutrition Diagnosis  Inadequate energy intake    Related to (etiology):   Inability to consume sufficient energy    Signs and Symptoms (as evidenced by):   NPO with no alternate means for nutrition     Interventions/Recommendations (treatment strategy):  See recs.    Nutrition Diagnosis Status:   New

## 2018-04-01 NOTE — SUBJECTIVE & OBJECTIVE
Interval History: Extubated    Medications:  Continuous Infusions:   sodium chloride 0.9% 1,220 mL (04/01/18 0600)    niCARdipine Stopped (03/31/18 1800)     Scheduled Meds:   cefTRIAXone (ROCEPHIN) IVPB  1 g Intravenous Q12H    chlorhexidine  15 mL Mouth/Throat BID    levetiracetam IVPB  500 mg Intravenous Q12H    nicotine  1 patch Transdermal Daily    pantoprozole (PROTONIX) 40 mg/100 mL D5W IVPB  40 mg Intravenous Q12H    sodium chloride 0.9%  3 mL Intravenous Q8H    thiamine (VITAMIN B1) IVPB  100 mg Intravenous Daily    vancomycin (VANCOCIN) IVPB  1,000 mg Intravenous Q12H     PRN Meds:magnesium oxide, magnesium oxide, potassium chloride 10%, potassium chloride 10%, potassium chloride 10%, potassium, sodium phosphates, potassium, sodium phosphates, potassium, sodium phosphates, promethazine     Review of Systems  Objective:     Weight: 64.7 kg (142 lb 10.2 oz)  Body mass index is 24.48 kg/m².  Vital Signs (Most Recent):  Temp: 99.5 °F (37.5 °C) (04/01/18 0701)  Pulse: 91 (04/01/18 1100)  Resp: (!) 25 (04/01/18 1100)  BP: (!) 143/91 (04/01/18 1100)  SpO2: 96 % (04/01/18 1100) Vital Signs (24h Range):  Temp:  [99 °F (37.2 °C)-99.5 °F (37.5 °C)] 99.5 °F (37.5 °C)  Pulse:  [] 91  Resp:  [16-36] 25  SpO2:  [86 %-100 %] 96 %  BP: ()/() 143/91  Arterial Line BP: (110-151)/(64-81) 146/79       Date 04/01/18 0700 - 04/02/18 0659   Shift 2245-0164 9712-1433 6767-8123 24 Hour Total   I  N  T  A  K  E   I.V.  (mL/kg) 4428.3  (68.4)   4428.3  (68.4)    IV Piggyback 150   150    Shift Total  (mL/kg) 4578.3  (70.8)   4578.3  (70.8)   O  U  T  P  U  T   Urine  (mL/kg/hr) 500   500    Shift Total  (mL/kg) 500  (7.7)   500  (7.7)   Weight (kg) 64.7 64.7 64.7 64.7              Vent Mode: A/C  Oxygen Concentration (%):  [3] 3  Resp Rate Total:  [27 br/min] 27 br/min  Vt Set:  [450 mL] 450 mL  PEEP/CPAP:  [5 cmH20] 5 cmH20  Mean Airway Pressure:  [4 cmH20] 4 cmH20         Neurosurgery Physical  Exam    AAOx3, PERRL, EOMI, FS, TM, 5/5 throughout, SILT.  DTR 2+ throughout, no Silveira's, no clonus.     Significant Labs:    Recent Labs  Lab 03/31/18  1046  04/01/18  0049 04/01/18  0407 04/01/18  1208   *  --   --  123*  --    *  < > 117* 119*  119* 124*  124*   K 3.1*  --   --  2.6*  --    CL 76*  --   --  87*  --    CO2 24  --   --  25  --    BUN 2*  --   --  2*  --    CREATININE 0.6  --   --  0.5  --    CALCIUM 8.2*  --   --  7.8*  --    MG 2.1  --   --  2.3  --    < > = values in this interval not displayed.    Recent Labs  Lab 03/31/18  1046 03/31/18  1046 04/01/18  0407   WBC 14.63*  --  9.07   HGB 12.3  --  11.6*   HCT 33.8* 41 32.0*     --  172       Recent Labs  Lab 03/31/18  1049 04/01/18  0407   INR 1.0 0.9   APTT  --  24.4     Microbiology Results (last 7 days)     Procedure Component Value Units Date/Time    Blood culture [[id number]] Collected:  03/31/18 2112    Order Status:  Completed Specimen:  Blood from Peripheral, Antecubital, Left Updated:  04/01/18 0915     Blood Culture, Routine No Growth to date    Narrative:       Blood cultures from 2 different sites. 4 bottles total.  Please draw before starting antibiotics.    Blood culture [[id number]] Collected:  03/31/18 2250    Order Status:  Completed Specimen:  Blood from Peripheral, Antecubital, Right Updated:  04/01/18 0915     Blood Culture, Routine No Growth to date    Narrative:       Blood cultures x 2 different sites. 4 bottles total. Please  draw cultures before administering antibiotics.    Blood culture [[id number]] Collected:  03/31/18 1533    Order Status:  Completed Specimen:  Blood from Line, Arterial, Right Updated:  04/01/18 0115     Blood Culture, Routine No Growth to date    Blood Culture #2 **CANNOT BE ORDERED STAT** [[id number]] Collected:  03/31/18 1533    Order Status:  Completed Specimen:  Blood from Peripheral, Forearm, Right Updated:  04/01/18 0115     Blood Culture, Routine No Growth to date     Culture, Respiratory with Gram Stain [[id number]]     Order Status:  No result Specimen:  Respiratory from Endotracheal Aspirate         All pertinent labs from the last 24 hours have been reviewed.    Significant Diagnostics:  CT: Ct Head Without Contrast    Result Date: 4/1/2018  Stable to slightly improved subdural hemorrhage overlying the right cerebral convexity with stable mass effect and leftward midline shift of approximately 0.3 cm. Sphenoid sinus disease. Electronically signed by resident: Tristan Berrios Date:    04/01/2018 Time:    05:30 Electronically signed by: Hai Javed MD Date:    04/01/2018 Time:    05:53    Ct Head Without Contrast    Result Date: 3/31/2018  Acute right subdural hemorrhage with subjacent mass effect and 0.9 cm of leftward midline shift.  No hydrocephalus.  Recommend short-term follow-up. Sinus disease, as above. COMMUNICATION This critical result was discovered/received at 1325.  The critical information above was relayed directly by me by telephone to Dr. Jeremiah Rg on 03/31/2018 at 13 30. Electronically signed by resident: Felipe Ascencio Date:    03/31/2018 Time:    13:27 Electronically signed by: Chucky Chester MD Date:    03/31/2018 Time:    13:41

## 2018-04-01 NOTE — PLAN OF CARE
Problem: Patient Care Overview  Goal: Plan of Care Review    Recommendations     Recommendation/Intervention:   1. When medically able, advance diet as tolerated to regular with texture per SLP.   2. If unable to advance diet, please re-consult for TF recommendations.   3. RD following.     Goals: Advancement of diet by RD follow up  Nutrition Goal Status: new

## 2018-04-01 NOTE — PROGRESS NOTES
I asked ABDIFATAH Pisano in the ED if the blood cultures had been drawn. She stated that they were drawn from the arterial line. Mehdi Esposito NP notified. Verbally ordered for me to call the lab and have those cultures discarded and he will place new orders for blood cultures. Will update night nurse.

## 2018-04-02 LAB
ALBUMIN SERPL BCP-MCNC: 3.1 G/DL
ALP SERPL-CCNC: 65 U/L
ALT SERPL W/O P-5'-P-CCNC: 13 U/L
ANION GAP SERPL CALC-SCNC: 8 MMOL/L
AST SERPL-CCNC: 18 U/L
BASOPHILS # BLD AUTO: 0.02 K/UL
BASOPHILS NFR BLD: 0.2 %
BILIRUB SERPL-MCNC: 0.4 MG/DL
BUN SERPL-MCNC: 3 MG/DL
CALCIUM SERPL-MCNC: 8.3 MG/DL
CHLORIDE SERPL-SCNC: 95 MMOL/L
CO2 SERPL-SCNC: 24 MMOL/L
CREAT SERPL-MCNC: 0.5 MG/DL
DIFFERENTIAL METHOD: ABNORMAL
EOSINOPHIL # BLD AUTO: 0 K/UL
EOSINOPHIL NFR BLD: 0.1 %
ERYTHROCYTE [DISTWIDTH] IN BLOOD BY AUTOMATED COUNT: 11.7 %
EST. GFR  (AFRICAN AMERICAN): >60 ML/MIN/1.73 M^2
EST. GFR  (NON AFRICAN AMERICAN): >60 ML/MIN/1.73 M^2
GLUCOSE SERPL-MCNC: 98 MG/DL
HCT VFR BLD AUTO: 29.2 %
HGB BLD-MCNC: 10.4 G/DL
IMM GRANULOCYTES # BLD AUTO: 0.02 K/UL
IMM GRANULOCYTES NFR BLD AUTO: 0.2 %
LYMPHOCYTES # BLD AUTO: 1.2 K/UL
LYMPHOCYTES NFR BLD: 13.8 %
MAGNESIUM SERPL-MCNC: 2.1 MG/DL
MCH RBC QN AUTO: 29.8 PG
MCHC RBC AUTO-ENTMCNC: 35.6 G/DL
MCV RBC AUTO: 84 FL
MONOCYTES # BLD AUTO: 0.5 K/UL
MONOCYTES NFR BLD: 6.3 %
NEUTROPHILS # BLD AUTO: 6.7 K/UL
NEUTROPHILS NFR BLD: 79.4 %
NRBC BLD-RTO: 0 /100 WBC
PHOSPHATE SERPL-MCNC: 1.4 MG/DL
PLATELET # BLD AUTO: 144 K/UL
PMV BLD AUTO: 10.1 FL
POTASSIUM SERPL-SCNC: 3.3 MMOL/L
PROT SERPL-MCNC: 5.4 G/DL
RBC # BLD AUTO: 3.49 M/UL
RPR SER QL: NORMAL
SODIUM SERPL-SCNC: 126 MMOL/L
SODIUM SERPL-SCNC: 127 MMOL/L
SODIUM SERPL-SCNC: 129 MMOL/L
SODIUM SERPL-SCNC: 130 MMOL/L
WBC # BLD AUTO: 8.47 K/UL

## 2018-04-02 PROCEDURE — 25000003 PHARM REV CODE 250: Performed by: STUDENT IN AN ORGANIZED HEALTH CARE EDUCATION/TRAINING PROGRAM

## 2018-04-02 PROCEDURE — 25000003 PHARM REV CODE 250: Performed by: PSYCHIATRY & NEUROLOGY

## 2018-04-02 PROCEDURE — 83735 ASSAY OF MAGNESIUM: CPT

## 2018-04-02 PROCEDURE — 84295 ASSAY OF SERUM SODIUM: CPT | Mod: 91

## 2018-04-02 PROCEDURE — A4216 STERILE WATER/SALINE, 10 ML: HCPCS | Performed by: STUDENT IN AN ORGANIZED HEALTH CARE EDUCATION/TRAINING PROGRAM

## 2018-04-02 PROCEDURE — 63600175 PHARM REV CODE 636 W HCPCS: Performed by: PHYSICIAN ASSISTANT

## 2018-04-02 PROCEDURE — 63600175 PHARM REV CODE 636 W HCPCS: Performed by: STUDENT IN AN ORGANIZED HEALTH CARE EDUCATION/TRAINING PROGRAM

## 2018-04-02 PROCEDURE — 97161 PT EVAL LOW COMPLEX 20 MIN: CPT

## 2018-04-02 PROCEDURE — 99233 SBSQ HOSP IP/OBS HIGH 50: CPT | Mod: ,,, | Performed by: PHYSICIAN ASSISTANT

## 2018-04-02 PROCEDURE — 25000003 PHARM REV CODE 250: Performed by: PHYSICIAN ASSISTANT

## 2018-04-02 PROCEDURE — 99232 SBSQ HOSP IP/OBS MODERATE 35: CPT | Mod: ,,, | Performed by: NEUROLOGICAL SURGERY

## 2018-04-02 PROCEDURE — S4991 NICOTINE PATCH NONLEGEND: HCPCS | Performed by: ANESTHESIOLOGY

## 2018-04-02 PROCEDURE — 25000003 PHARM REV CODE 250: Performed by: ANESTHESIOLOGY

## 2018-04-02 PROCEDURE — 63600175 PHARM REV CODE 636 W HCPCS: Mod: JG | Performed by: PSYCHIATRY & NEUROLOGY

## 2018-04-02 PROCEDURE — 80053 COMPREHEN METABOLIC PANEL: CPT

## 2018-04-02 PROCEDURE — 20000000 HC ICU ROOM

## 2018-04-02 PROCEDURE — 84100 ASSAY OF PHOSPHORUS: CPT

## 2018-04-02 PROCEDURE — 99252 IP/OBS CONSLTJ NEW/EST SF 35: CPT | Mod: ,,, | Performed by: NURSE PRACTITIONER

## 2018-04-02 PROCEDURE — 84295 ASSAY OF SERUM SODIUM: CPT

## 2018-04-02 PROCEDURE — 85025 COMPLETE CBC W/AUTO DIFF WBC: CPT

## 2018-04-02 PROCEDURE — 97165 OT EVAL LOW COMPLEX 30 MIN: CPT

## 2018-04-02 RX ORDER — FOLIC ACID 1 MG/1
1 TABLET ORAL DAILY
Status: DISCONTINUED | OUTPATIENT
Start: 2018-04-02 | End: 2018-04-07 | Stop reason: HOSPADM

## 2018-04-02 RX ORDER — GABAPENTIN 300 MG/1
300 CAPSULE ORAL 3 TIMES DAILY
Status: DISCONTINUED | OUTPATIENT
Start: 2018-04-02 | End: 2018-04-05

## 2018-04-02 RX ORDER — DEXTROSE MONOHYDRATE 50 MG/ML
INJECTION, SOLUTION INTRAVENOUS CONTINUOUS
Status: DISCONTINUED | OUTPATIENT
Start: 2018-04-02 | End: 2018-04-03

## 2018-04-02 RX ORDER — LEVOTHYROXINE SODIUM 75 UG/1
75 TABLET ORAL
Status: DISCONTINUED | OUTPATIENT
Start: 2018-04-02 | End: 2018-04-07 | Stop reason: HOSPADM

## 2018-04-02 RX ORDER — PHENYTOIN SODIUM 100 MG/1
100 CAPSULE, EXTENDED RELEASE ORAL 3 TIMES DAILY
Status: DISCONTINUED | OUTPATIENT
Start: 2018-04-02 | End: 2018-04-03

## 2018-04-02 RX ORDER — THIAMINE HCL 100 MG
100 TABLET ORAL DAILY
Status: DISCONTINUED | OUTPATIENT
Start: 2018-04-03 | End: 2018-04-07 | Stop reason: HOSPADM

## 2018-04-02 RX ORDER — DESMOPRESSIN ACETATE 4 UG/ML
2 INJECTION, SOLUTION INTRAVENOUS; SUBCUTANEOUS EVERY 6 HOURS
Status: COMPLETED | OUTPATIENT
Start: 2018-04-02 | End: 2018-04-03

## 2018-04-02 RX ORDER — HEPARIN SODIUM 5000 [USP'U]/ML
5000 INJECTION, SOLUTION INTRAVENOUS; SUBCUTANEOUS EVERY 8 HOURS
Status: DISCONTINUED | OUTPATIENT
Start: 2018-04-02 | End: 2018-04-07 | Stop reason: HOSPADM

## 2018-04-02 RX ADMIN — OXYCODONE AND ACETAMINOPHEN 1 TABLET: 10; 325 TABLET ORAL at 02:04

## 2018-04-02 RX ADMIN — FOLIC ACID 1 MG: 1 TABLET ORAL at 01:04

## 2018-04-02 RX ADMIN — PHENYTOIN SODIUM 100 MG: 100 CAPSULE ORAL at 02:04

## 2018-04-02 RX ADMIN — DESMOPRESSIN ACETATE 2 MCG: 4 SOLUTION INTRAVENOUS at 04:04

## 2018-04-02 RX ADMIN — POTASSIUM CHLORIDE 40 MEQ: 20 SOLUTION ORAL at 06:04

## 2018-04-02 RX ADMIN — CHLORHEXIDINE GLUCONATE 15 ML: 1.2 RINSE ORAL at 08:04

## 2018-04-02 RX ADMIN — HEPARIN SODIUM 5000 UNITS: 5000 INJECTION, SOLUTION INTRAVENOUS; SUBCUTANEOUS at 06:04

## 2018-04-02 RX ADMIN — LEVETIRACETAM 500 MG: 5 INJECTION INTRAVENOUS at 08:04

## 2018-04-02 RX ADMIN — Medication 3 ML: at 06:04

## 2018-04-02 RX ADMIN — NICOTINE 1 PATCH: 21 PATCH, EXTENDED RELEASE TRANSDERMAL at 08:04

## 2018-04-02 RX ADMIN — DESMOPRESSIN ACETATE 2 MCG: 4 SOLUTION INTRAVENOUS at 01:04

## 2018-04-02 RX ADMIN — GABAPENTIN 300 MG: 300 CAPSULE ORAL at 12:04

## 2018-04-02 RX ADMIN — POTASSIUM CHLORIDE 60 MEQ: 20 SOLUTION ORAL at 08:04

## 2018-04-02 RX ADMIN — THIAMINE HYDROCHLORIDE 100 MG: 100 INJECTION, SOLUTION INTRAMUSCULAR; INTRAVENOUS at 08:04

## 2018-04-02 RX ADMIN — OXYCODONE AND ACETAMINOPHEN 1 TABLET: 10; 325 TABLET ORAL at 08:04

## 2018-04-02 RX ADMIN — HEPARIN SODIUM 5000 UNITS: 5000 INJECTION, SOLUTION INTRAVENOUS; SUBCUTANEOUS at 10:04

## 2018-04-02 RX ADMIN — CEFTRIAXONE SODIUM 1 G: 1 INJECTION, POWDER, FOR SOLUTION INTRAMUSCULAR; INTRAVENOUS at 01:04

## 2018-04-02 RX ADMIN — GABAPENTIN 300 MG: 300 CAPSULE ORAL at 08:04

## 2018-04-02 RX ADMIN — POTASSIUM & SODIUM PHOSPHATES POWDER PACK 280-160-250 MG 2 PACKET: 280-160-250 PACK at 06:04

## 2018-04-02 RX ADMIN — POTASSIUM & SODIUM PHOSPHATES POWDER PACK 280-160-250 MG 2 PACKET: 280-160-250 PACK at 08:04

## 2018-04-02 RX ADMIN — GABAPENTIN 300 MG: 300 CAPSULE ORAL at 02:04

## 2018-04-02 RX ADMIN — PHENYTOIN SODIUM 100 MG: 100 CAPSULE ORAL at 08:04

## 2018-04-02 RX ADMIN — Medication 3 ML: at 10:04

## 2018-04-02 RX ADMIN — LEVOTHYROXINE SODIUM 75 MCG: 75 TABLET ORAL at 12:04

## 2018-04-02 NOTE — CONSULTS
[facility]  Physical Medicine & Rehab  Consult Note    Patient Name: Caridad Ortiz  MRN: [id number]  Admission Date: 3/31/2018  Hospital Length of Stay: 2 days  Attending Physician: Joshua Mac MD     Inpatient consult to Physical Medicine & Rehabilitation  Consult performed by: Dafne Grace NP  Consult requested by:  Joshua Mac MD    Collaborating Physician: Odalys Tyler MD  Reason for Consult:  assess rehabilitation needs    Consults  Subjective:     Principal Problem: Acute subdural hematoma    HPI: Caridad Ortiz is a 53-year-old female with PMHx of HTN, seizures (non-compliant on Dilantin), anxiety, and depression.  Patient presented to [facility] after witnessed seizure.  On arrival, intubated and sedated for acute respiratory failure.  Found to be hypertensive and hyponatremic.  CTH showed R SDH.  She was then transferred to [facility] on 3/31/18 for further evaluation and management.  Upon admission, repeat CTH showed acute R SDH with mass effect and leftward midline shift.  Evaluated by Neurosurgery, and no acute surgical intervention necessary.      Functional History: Patient lives in [place] with friends in a single story home without steps to enter.  Prior to admission, she was (I) with ADLs and mobility.  DME: none.    Hospital Course:   4/1/18:  Refused PT evaluation due to 10/10 head and back pain.    4/2/18:  Evaluated by OT and PT.  Bed mobility SV.  Sit to stand SV and transfers SV.  UBD SV and LBD SV.    Past Medical History:   Diagnosis Date    Anxiety     Depression     History of psychiatric care     Hypertension     Psychiatric exam     Seizures      Past Surgical History:   Procedure Laterality Date    BACK SURGERY      TONSILLECTOMY       Review of patient's allergies indicates:  No Known Allergies    Scheduled Medications:    desmopressin  2 mcg Intravenous Q6H    levetiracetam IVPB  500 mg Intravenous Q12H    nicotine  1 patch  Transdermal Daily    sodium chloride 0.9%  3 mL Intravenous Q8H    thiamine (VITAMIN B1) IVPB  100 mg Intravenous Daily       PRN Medications: magnesium oxide, magnesium oxide, oxyCODONE-acetaminophen, potassium chloride 10%, potassium chloride 10%, potassium chloride 10%, potassium, sodium phosphates, potassium, sodium phosphates, potassium, sodium phosphates, promethazine    Family History     None        Social History Main Topics    Smoking status: Current Every Day Smoker     Packs/day: 1.00     Years: 10.00    Smokeless tobacco: Not on file    Alcohol use 0.0 oz/week     5 - 10 Cans of beer per week    Drug use: No    Sexual activity: No     Review of Systems   Constitutional: Negative for chills, fatigue and fever.   HENT: Negative for drooling, hearing loss, trouble swallowing and voice change.    Eyes: Negative for pain and visual disturbance.   Respiratory: Negative for cough, shortness of breath and wheezing.    Cardiovascular: Negative for chest pain and palpitations.   Gastrointestinal: Negative for abdominal distention, nausea and vomiting.   Genitourinary: Negative for difficulty urinating and flank pain.   Musculoskeletal: Positive for back pain and myalgias. Negative for arthralgias and neck pain.   Skin: Negative for rash and wound.   Neurological: Positive for headaches. Negative for dizziness, weakness and numbness.   Psychiatric/Behavioral: Negative for agitation and hallucinations. The patient is not nervous/anxious.      Objective:     Vital Signs (Most Recent):  Temp: 98.4 °F (36.9 °C) (04/02/18 1100)  Pulse: 85 (04/02/18 1100)  Resp: (!) 21 (04/02/18 1100)  BP: (!) 139/94 (04/02/18 0800)  SpO2: 100 % (04/02/18 1100)    Vital Signs (24h Range):  Temp:  [98.4 °F (36.9 °C)-99 °F (37.2 °C)] 98.4 °F (36.9 °C)  Pulse:  [77-97] 85  Resp:  [14-40] 21  SpO2:  [86 %-100 %] 100 %  BP: (108-152)/() 139/94  Arterial Line BP: (120-163)/(66-89) 148/81     Body mass index is 24.48  kg/m².    Physical Exam   Constitutional: She is oriented to person, place, and time. She appears well-developed and well-nourished. She is sleeping. She is easily aroused. No distress.   HENT:   Head: Normocephalic and atraumatic.   Right Ear: External ear normal.   Left Ear: External ear normal.   Nose: Nose normal.   Eyes: Right eye exhibits no discharge. Left eye exhibits no discharge. No scleral icterus.   Neck: Normal range of motion.   Cardiovascular: Normal rate, regular rhythm and intact distal pulses.    Pulmonary/Chest: Effort normal. No respiratory distress. She has no wheezes.   Abdominal: Soft. She exhibits no distension. There is no tenderness.   Musculoskeletal: Normal range of motion. She exhibits no edema or tenderness.   Neurological: She is oriented to person, place, and time and easily aroused. She has normal strength. She exhibits normal muscle tone.   Skin: Skin is warm and dry. No rash noted.   Psychiatric: She has a normal mood and affect. Her behavior is normal. Cognition and memory are impaired.   Vitals reviewed.    Diagnostic Results:   Labs: Reviewed  X-Ray: Reviewed  CT: Reviewed    Assessment/Plan:     * Acute subdural hematoma    -  CTH R SDH  -  Seizure following head injury  -  Neurosurgery following--> no acute surgical intervention  See hospital course for functional, cognitive/speech/language, and nutrition/swallow status.      Recommendations  -  Monitor sleep disturbances and establish consistent sleep-wake cycle  ·  Day time- lights on and shades open, night time- lights dim/off  -  Environmental modifications to limit agitation/confusion   · Appropriate lighting, family at bedside, visible clock and calendar, updated white board, reduce noise, limited visitors, clustered nursing care  -  Reorient patient to person, place, time, and situation on each encounter  -  Avoid restraints  -  May benefit from 24/7 supervision  -  Avoid/limit medications that can worsen delirium  (benzodiazepines, antihistamines, anticholinergics, hypnotics, opiates)  -  Encourage mobility, OOB in chair, and early ambulation as appropriate  -  PT/OT evaluate and treat  -  SLP speech and cognitive evaluate and treat  -  Monitor for bowel and bladder dysfunction  -  Monitor for and prevent skin breakdown and pressure ulcers  · Early mobility, repositioning/weight shifting every 20-30 minutes when sitting, turn patient every 2 hours, proper mattress/overlay and chair cushioning, pressure relief/heel protector boots  -  DVT prophylaxis:  JASKARAN, SCD        Seizure after head injury    -  See SDH  -  H/o seizures        Evaluated by PT and OT.  Close to premorbid level of function and without goals for inpatient rehab.  Recommend home health therapy vs outpatient therapy pending insurance coverage.  Will sign off.  Please call with questions/concerns or re-consult if situation changes.    Thank you for your consult.     DIANA Aponte  Department of Physical Medicine & Rehab  [facility]

## 2018-04-02 NOTE — ASSESSMENT & PLAN NOTE
53F PMH presenting with R acute SDH. GCS [id number]    -- Will monitor closely for need to evac SDH.  -- CT head Wednesday to assess SDH  -- Correct hyponatremia per NCC,  -- SBP <160.  -- HOB >30.  -- Neurochecks q1h, call NSGY immediately with decline in exam.  -- Medical management per NCC.

## 2018-04-02 NOTE — PLAN OF CARE
Problem: Occupational Therapy Goal  Goal: Occupational Therapy Goal  Goals to be met by: 4/9   Patient will increase functional independence with ADLs by performing:    Grooming while standing at sink with Supervision.  Toileting from toilet with Supervision for hygiene and clothing management.   Toilet transfer to toilet with Supervision.  Functional mobility at household distance for ADL task with supervision assistance.   Pt will complete dynamic standing task to simulate home mgmt task with supervision for 8 min duration.   Outcome: Ongoing (interventions implemented as appropriate)  OT to follow up 3x/w at this time. Rec  for d/c planning. 0 DME needs. LUCY Lopez 4/2/2018

## 2018-04-02 NOTE — PROGRESS NOTES
"Pt stated "I am feeling lonely and would like someone to talk to".  Asked if she would like  to speak to someone from [facility] and she agreed.  [facility] contacted.  At bedside talking to pt.    "

## 2018-04-02 NOTE — PLAN OF CARE
SW is following this Pt for DC planning needs. There are no identified needs at this time. Pt does not have insurance and cannot have HH as rec by therapy. Therapy advised in huddle and will eval for DME or other needs.    Court Raphael LMSW  Neurocritical Care   [facility]  [id number]

## 2018-04-02 NOTE — HOSPITAL COURSE
4/1/18:  Refused PT evaluation due to 10/10 head and back pain.    4/2/18:  Evaluated by PT and OT.  Bed mobility SV.  Sit to stand SV and transfers SV.  UBD SV and LBD SV.

## 2018-04-02 NOTE — ASSESSMENT & PLAN NOTE
· Patient has reported history of prior seizure disorder(-partial complex)  · Convert IV keppra to home dose of PO dilantin, follow up AM level  · Seizure precautions.

## 2018-04-02 NOTE — HPI
Caridad Ortiz is a 53-year-old female with PMHx of HTN, seizures (non-compliant on Dilantin), anxiety, and depression.  Patient presented to [facility] after witnessed seizure.  On arrival, intubated and sedated for acute respiratory failure.  Found to be hypertensive and hyponatremic.  CTH showed R SDH.  She was then transferred to [facility] on 3/31/18 for further evaluation and management.  Upon admission, repeat CTH showed acute R SDH with mass effect and leftward midline shift.  Evaluated by Neurosurgery, and no acute surgical intervention necessary.      Functional History: Patient lives in [place] with friends in a single story home without steps to enter.  Prior to admission, she was (I) with ADLs and mobility.  DME: none.

## 2018-04-02 NOTE — PLAN OF CARE
ICU Attending Note  Neurocritical Care    Hyponatremia, likely potomania  Acute R SDH  Complex partial epilepsy, not intractable, without SE    Na skyrocketing.    [id number]    -gabapentin  -desmopressin 2 mcg IV q6h  -fluid restriction 1000 mL/d  -D5 75 mL/h  -convert from levetiracetam to phenytoin at home dosing  --160  -q6h Na  -start diet  -start heparin prophylaxis  -change thiamine to PO

## 2018-04-02 NOTE — PROGRESS NOTES
[facility]  Neurosurgery  Progress Note    Subjective:     History of Present Illness: Patient is a 53 year old female with a history of HTN and seizures, noncompliant on Dilantin who presents today with SDH. Reportedly not on anticoagulation. Patient hyponatremic to 110 on arrival.     Post-Op Info:  * No surgery found *         Interval History: Extubated    Medications:  Continuous Infusions:   dextrose 5 % 75 mL/hr at 04/02/18 1300     Scheduled Meds:   desmopressin  2 mcg Intravenous Q6H    folic acid  1 mg Oral Daily    gabapentin  300 mg Oral TID    heparin (porcine)  5,000 Units Subcutaneous Q8H    levothyroxine  75 mcg Oral Before breakfast    nicotine  1 patch Transdermal Daily    phenytoin  100 mg Oral TID    sodium chloride 0.9%  3 mL Intravenous Q8H    [START ON 4/3/2018] thiamine  100 mg Oral Daily     PRN Meds:oxyCODONE-acetaminophen     Review of Systems  Objective:     Weight: 64.7 kg (142 lb 10.2 oz)  Body mass index is 24.48 kg/m².  Vital Signs (Most Recent):  Temp: 98.4 °F (36.9 °C) (04/02/18 1100)  Pulse: 85 (04/02/18 1300)  Resp: 16 (04/02/18 1300)  BP: 133/85 (04/02/18 1200)  SpO2: 100 % (04/02/18 1300) Vital Signs (24h Range):  Temp:  [98.4 °F (36.9 °C)-99 °F (37.2 °C)] 98.4 °F (36.9 °C)  Pulse:  [77-97] 85  Resp:  [14-40] 16  SpO2:  [86 %-100 %] 100 %  BP: (108-152)/() 133/85  Arterial Line BP: (120-163)/(66-89) 132/68       Date 04/02/18 0700 - 04/03/18 0659   Shift 3903-6328 8239-1579 8921-9359 24 Hour Total   I  N  T  A  K  E   P.O. 240   240    I.V.  (mL/kg) 375  (5.8)   375  (5.8)    IV Piggyback 150   150    Shift Total  (mL/kg) 765  (11.8)   765  (11.8)   O  U  T  P  U  T   Urine  (mL/kg/hr) 950  (1.8)   950    Shift Total  (mL/kg) 950  (14.7)   950  (14.7)   Weight (kg) 64.7 64.7 64.7 64.7                        Neurosurgery Physical Exam    AAOx3, PERRL, EOMI, FS, TM, 5/5 throughout, SILT.  DTR 2+ throughout, no Silveira's, no clonus.      Significant Labs:    Recent Labs  Lab 04/01/18  0407  04/02/18  0206 04/02/18  0438 04/02/18  0813 04/02/18  1209   *  --  98  --   --   --    *  119*  < > 127*  127* 129* 130* 127*   K 2.6*  --  3.3*  --   --   --    CL 87*  --  95  --   --   --    CO2 25  --  24  --   --   --    BUN 2*  --  3*  --   --   --    CREATININE 0.5  --  0.5  --   --   --    CALCIUM 7.8*  --  8.3*  --   --   --    MG 2.3  --  2.1  --   --   --    < > = values in this interval not displayed.    Recent Labs  Lab 04/01/18 0407 04/02/18  0206   WBC 9.07 8.47   HGB 11.6* 10.4*   HCT 32.0* 29.2*    144*       Recent Labs  Lab 04/01/18 0407   INR 0.9   APTT 24.4     Microbiology Results (last 7 days)     Procedure Component Value Units Date/Time    Blood culture [[id number]] Collected:  03/31/18 2112    Order Status:  Completed Specimen:  Blood from Peripheral, Antecubital, Left Updated:  04/02/18 0612     Blood Culture, Routine No Growth to date     Blood Culture, Routine No Growth to date    Narrative:       Blood cultures from 2 different sites. 4 bottles total.  Please draw before starting antibiotics.    Blood culture [[id number]] Collected:  03/31/18 2250    Order Status:  Completed Specimen:  Blood from Peripheral, Antecubital, Right Updated:  04/02/18 0612     Blood Culture, Routine No Growth to date     Blood Culture, Routine No Growth to date    Narrative:       Blood cultures x 2 different sites. 4 bottles total. Please  draw cultures before administering antibiotics.    Blood culture [[id number]] Collected:  03/31/18 1533    Order Status:  Completed Specimen:  Blood from Line, Arterial, Right Updated:  04/01/18 1812     Blood Culture, Routine No Growth to date     Blood Culture, Routine No Growth to date    Blood Culture #2 **CANNOT BE ORDERED STAT** [[id number]] Collected:  03/31/18 1533    Order Status:  Completed Specimen:  Blood from Peripheral, Forearm, Right Updated:  04/01/18 1812     Blood Culture,  Routine No Growth to date     Blood Culture, Routine No Growth to date    Culture, Respiratory with Gram Stain [[id number]]     Order Status:  No result Specimen:  Respiratory from Endotracheal Aspirate         All pertinent labs from the last 24 hours have been reviewed.    Significant Diagnostics:  CT: Ct Head Without Contrast    Result Date: 4/1/2018  Stable to slightly improved subdural hemorrhage overlying the right cerebral convexity with stable mass effect and leftward midline shift of approximately 0.3 cm. Sphenoid sinus disease. Electronically signed by resident: Tristan Berrios Date:    04/01/2018 Time:    05:30 Electronically signed by: Hai Javed MD Date:    04/01/2018 Time:    05:53    Ct Head Without Contrast    Result Date: 3/31/2018  Acute right subdural hemorrhage with subjacent mass effect and 0.9 cm of leftward midline shift.  No hydrocephalus.  Recommend short-term follow-up. Sinus disease, as above. COMMUNICATION This critical result was discovered/received at 1325.  The critical information above was relayed directly by me by telephone to Dr. Jeremiah Rg on 03/31/2018 at 13 30. Electronically signed by resident: Felipe Ascencio Date:    03/31/2018 Time:    13:27 Electronically signed by: Chucky Chester MD Date:    03/31/2018 Time:    13:41Interval History: NAEON, Na increased to 127.     Medications:  Continuous Infusions:   dextrose 5 % 75 mL/hr at 04/02/18 1300     Scheduled Meds:   desmopressin  2 mcg Intravenous Q6H    folic acid  1 mg Oral Daily    gabapentin  300 mg Oral TID    levothyroxine  75 mcg Oral Before breakfast    nicotine  1 patch Transdermal Daily    phenytoin  100 mg Oral TID    sodium chloride 0.9%  3 mL Intravenous Q8H    [START ON 4/3/2018] thiamine  100 mg Oral Daily     PRN Meds:magnesium oxide, magnesium oxide, oxyCODONE-acetaminophen, potassium chloride 10%, potassium chloride 10%, potassium chloride 10%, potassium, sodium phosphates, potassium,  sodium phosphates, potassium, sodium phosphates, promethazine     Review of Systems  Objective:     Weight: 64.7 kg (142 lb 10.2 oz)  Body mass index is 24.48 kg/m².  Vital Signs (Most Recent):  Temp: 98.4 °F (36.9 °C) (04/02/18 1100)  Pulse: 85 (04/02/18 1300)  Resp: 16 (04/02/18 1300)  BP: 133/85 (04/02/18 1200)  SpO2: 100 % (04/02/18 1300) Vital Signs (24h Range):  Temp:  [98.4 °F (36.9 °C)-99 °F (37.2 °C)] 98.4 °F (36.9 °C)  Pulse:  [77-97] 85  Resp:  [14-40] 16  SpO2:  [86 %-100 %] 100 %  BP: (108-152)/() 133/85  Arterial Line BP: (120-163)/(66-89) 132/68       Date 04/02/18 0700 - 04/03/18 0659   Shift 0737-0947 5539-6316 7524-8893 24 Hour Total   I  N  T  A  K  E   P.O. 240   240    I.V.  (mL/kg) 375  (5.8)   375  (5.8)    IV Piggyback 150   150    Shift Total  (mL/kg) 765  (11.8)   765  (11.8)   O  U  T  P  U  T   Urine  (mL/kg/hr) 950  (1.8)   950    Shift Total  (mL/kg) 950  (14.7)   950  (14.7)   Weight (kg) 64.7 64.7 64.7 64.7                        Neurosurgery Physical Exam   -Alert and oriented x4  -PERRL, EOMI, face symmetrical, tongue midline, facial sensation symmetric, shoulder shrug full strength and symmetric  -Motor: 5/5 throughout the upper and lower extremites bilaterally  -sensation intact to light touch throughout  -reflexes 2+ in patella and brachioradialis bilaterally  -no clonus, no Silveira's  -coordination intact to finger to nose bilaterally      Significant Labs:    Recent Labs  Lab 04/01/18  0407  04/02/18  0206 04/02/18  0438 04/02/18  0813 04/02/18  1209   *  --  98  --   --   --    *  119*  < > 127*  127* 129* 130* 127*   K 2.6*  --  3.3*  --   --   --    CL 87*  --  95  --   --   --    CO2 25  --  24  --   --   --    BUN 2*  --  3*  --   --   --    CREATININE 0.5  --  0.5  --   --   --    CALCIUM 7.8*  --  8.3*  --   --   --    MG 2.3  --  2.1  --   --   --    < > = values in this interval not displayed.    Recent Labs  Lab 04/01/18  0407 04/02/18  0206   WBC  9.07 8.47   HGB 11.6* 10.4*   HCT 32.0* 29.2*    144*       Recent Labs  Lab 04/01/18  0407   INR 0.9   APTT 24.4     Microbiology Results (last 7 days)     Procedure Component Value Units Date/Time    Blood culture [[id number]] Collected:  03/31/18 2112    Order Status:  Completed Specimen:  Blood from Peripheral, Antecubital, Left Updated:  04/02/18 0612     Blood Culture, Routine No Growth to date     Blood Culture, Routine No Growth to date    Narrative:       Blood cultures from 2 different sites. 4 bottles total.  Please draw before starting antibiotics.    Blood culture [[id number]] Collected:  03/31/18 2250    Order Status:  Completed Specimen:  Blood from Peripheral, Antecubital, Right Updated:  04/02/18 0612     Blood Culture, Routine No Growth to date     Blood Culture, Routine No Growth to date    Narrative:       Blood cultures x 2 different sites. 4 bottles total. Please  draw cultures before administering antibiotics.    Blood culture [[id number]] Collected:  03/31/18 1533    Order Status:  Completed Specimen:  Blood from Line, Arterial, Right Updated:  04/01/18 1812     Blood Culture, Routine No Growth to date     Blood Culture, Routine No Growth to date    Blood Culture #2 **CANNOT BE ORDERED STAT** [[id number]] Collected:  03/31/18 1533    Order Status:  Completed Specimen:  Blood from Peripheral, Forearm, Right Updated:  04/01/18 1812     Blood Culture, Routine No Growth to date     Blood Culture, Routine No Growth to date    Culture, Respiratory with Gram Stain [[id number]]     Order Status:  No result Specimen:  Respiratory from Endotracheal Aspirate         All pertinent labs from the last 24 hours have been reviewed.    Significant Diagnostics:  I have reviewed all pertinent imaging results/findings within the past 24 hours.    Assessment/Plan:     * Acute subdural hematoma    53F PMH presenting with R acute SDH. GCS [id number]    -- Will monitor closely for need to evac SDH.  -- CT head  Wednesday to assess SDH  -- Correct hyponatremia per NCC,  -- SBP <160.  -- HOB >30.  -- Neurochecks q1h, call NSGY immediately with decline in exam.  -- Medical management per [facility].             Nilesh Michael MD  Neurosurgery  [facility]

## 2018-04-02 NOTE — ASSESSMENT & PLAN NOTE
· First noted on OSH CT AM of 3/31  · Repeat imaging grossly stable.  · NSGY consulted, recommendations appreciated.  · Extubated, stable neurologicially  · Sodium over corrected, monitor in ICU to monitor Na q6 while adjusting levels  · Monitor for signs of ODS  · Fluid restrict

## 2018-04-02 NOTE — ASSESSMENT & PLAN NOTE
· Heavy beer drinker, likely secondary to potomania  · D5 at 50cc/hr, DDAVP and fluid restrict as above to avoid risk of osmotic demylelination syndrome with overcorrection on admission   · Follow sodiums q6

## 2018-04-02 NOTE — HOSPITAL COURSE
4/2: monitor Na closely, stable exam  4/5: CT head stable  4/6: endocrinology consulted for hyponatremia  4/7: NAEON. No complaints on exam. Sodium improved. Endocrinology cleared for discharge and recommended outpt follow up in [place]. Patient will follow up with neurosurgery in 2 weeks with repeat CT head.

## 2018-04-02 NOTE — PT/OT/SLP EVAL
Physical Therapy Evaluation    Patient Name:  Caridad Ortiz   MRN:  [id number]    Recommendations:     Discharge Recommendations:  home with home health   Discharge Equipment Recommendations: none   Barriers to discharge: None    Assessment:     Caridad Ortiz is a 53 y.o. female admitted with a medical diagnosis of Acute subdural hematoma.  She presents with the following impairments/functional limitations:  gait instability, impaired endurance, impaired balance, decreased safety awareness, impaired functional mobilty, pain. Pt performed bed mobility and transfers S. Pt took 4 steps to bedside chair S without AD, limited 2* HA and pt impulsive. Pt will benefit from skilled PT to improve deficits and increase overall functional mobility.     Rehab Prognosis:  Good; patient would benefit from acute skilled PT services to address these deficits and reach maximum level of function.      Recent Surgery: * No surgery found *      Plan:     During this hospitalization, patient to be seen 3 x/week to address the above listed problems via gait training, therapeutic activities, therapeutic exercises, neuromuscular re-education  · Plan of Care Expires:  05/02/18   Plan of Care Reviewed with: patient    Subjective     Communicated with RN prior to session.  Patient found supine in bed upon PT entry to room, agreeable to evaluation.      Chief Complaint: HA  Patient comments/goals: return home  Pain/Comfort:  · Pain Rating 1: other (see comments) (pt did not rate pain)  · Location - Side 1: Bilateral  · Location - Orientation 1: generalized  · Location 1: head  · Pain Addressed 1: Reposition, Distraction  · Pain Rating Post-Intervention 1: other (see comments) (pt did not rate pain)     Living Environment:  Pt lives with friends in 1-story house without DWIGHT. Pt reports (I) with ADLs and amb. Pt reports past stroke ~10 years ago with L sided deficits. Pt reports she does not currently work.   Prior to admission, patients level  of function was (I).  Patient has the following equipment: none.  DME owned (not currently used): none.  Upon discharge, patient will have assistance from friends.    Objective:     Patient found with: arterial line, telemetry, peripheral IV, blood pressure cuff, pulse ox (continuous)     General Precautions: Standard, fall, aspiration, seizure, NPO   Orthopedic Precautions:N/A   Braces: N/A     Exams:  · Cognitive Exam:  Patient is oriented to Person, Place, Time and Situation and follows 100% of multi-step commands   · Gross Motor Coordination:  WFL, heel to shin  · Postural Exam:  Patient presented with the following abnormalities:    · -       No postural abnormalities identified  · Sensation:    · -       Intact  light/touch B LE  · Skin Integrity/Edema:      · -       Skin integrity: Visible skin intact  · RLE ROM: WFL  · RLE Strength: WFL  · LLE ROM: WFL  · LLE Strength: WFL    Functional Mobility:  Bed Mobility:     · Supine to Sit: supervision    Transfers:     · Sit to Stand:  supervision with no AD    Gait: 4 steps to bedside chair S without AD, limited 2* HA and pt impulsive     AM-PAC 6 CLICK MOBILITY  Total Score:19       Therapeutic Activities and Exercises:  Pt sat EOB with S.  Pt educated on:  -role of PT/POC  -safety with mobility  -importance of OOB activity  Pt safe to amb to bathroom with RN staff.     Patient left up in chair with all lines intact, call button in reach and RN notified.    GOALS:    Physical Therapy Goals        Problem: Physical Therapy Goal    Goal Priority Disciplines Outcome Goal Variances Interventions   Physical Therapy Goal     PT/OT, PT Ongoing (interventions implemented as appropriate)     Description:  Goals to be met by: 2018     Patient will increase functional independence with mobility by performin. Sit to stand transfer with Modified Esmeralda  2. Gait  x 200 feet with Supervision without AD.   3. Lower extremity exercise program x15 reps per  handout, with supervision                      History:     Past Medical History:   Diagnosis Date    Anxiety     Depression     History of psychiatric care     Hypertension     Psychiatric exam     Seizures        Past Surgical History:   Procedure Laterality Date    BACK SURGERY      TONSILLECTOMY         Clinical Decision Making:     History  Co-morbidities and personal factors that may impact the plan of care Examination  Body Structures and Functions, activity limitations and participation restrictions that may impact the plan of care Clinical Presentation   Decision Making/ Complexity Score   Co-morbidities:   [] Time since onset of injury / illness / exacerbation  [] Status of current condition  [x]Patient's cognitive status and safety concerns    [] Multiple Medical Problems (see med hx)  Personal Factors:   [] Patient's age  [] Prior Level of function   [x] Patient's home situation (environment and family support)  [] Patient's level of motivation  [] Expected progression of patient      HISTORY:(criteria)    [] 22839 - no personal factors/history    [x] 37851 - has 1-2 personal factor/comorbidity     [] 18238 - has >3 personal factor/comorbidity     Body Regions:  [x] Objective examination findings  [] Head     []  Neck  [x] Trunk   [] Upper Extremity  [x] Lower Extremity    Body Systems:  [x] For communication ability, affect, cognition, language, and learning style: the assessment of the ability to make needs known, consciousness, orientation (person, place, and time), expected emotional /behavioral responses, and learning preferences (eg, learning barriers, education  needs)  [x] For the neuromuscular system: a general assessment of gross coordinated movement (eg, balance, gait, locomotion, transfers, and transitions) and motor function  (motor control and motor learning)  [x] For the musculoskeletal system: the assessment of gross symmetry, gross range of motion, gross strength, height, and  weight  [] For the integumentary system: the assessment of pliability(texture), presence of scar formation, skin color, and skin integrity  [] For cardiovascular/pulmonary system: the assessment of heart rate, respiratory rate, blood pressure, and edema     Activity limitations:    [] Patient's cognitive status and saf ety concerns          [] Status of current condition      [] Weight bearing restriction  [] Cardiopulmunary Restriction    Participation Restrictions:   [] Goals and goal agreement with the patient     [] Rehab potential (prognosis) and probable outcome      Examination of Body System: (criteria)    [] 83652 - addressing 1-2 elements    [] 88569 - addressing a total of 3 or more elements     [x] 00164 -  Addressing a total of 4 or more elements         Clinical Presentation: (criteria)  Stable - 38150     On examination of body system using standardized tests and measures patient presents with 1-2 elements from any of the following: body structures and functions, activity limitations, and/or participation restrictions.  Leading to a clinical presentation that is considered stable and/or uncomplicated                              Clinical Decision Making  (Eval Complexity):  Low- 94903     Time Tracking:     PT Received On: 04/02/18  PT Start Time: 0813     PT Stop Time: 0827  PT Total Time (min): 14 min     Billable Minutes: Evaluation 14      ROSALINDA PINTO, PT  04/02/2018

## 2018-04-02 NOTE — SUBJECTIVE & OBJECTIVE
Past Medical History:   Diagnosis Date    Anxiety     Depression     History of psychiatric care     Hypertension     Psychiatric exam     Seizures      Past Surgical History:   Procedure Laterality Date    BACK SURGERY      TONSILLECTOMY       Review of patient's allergies indicates:  No Known Allergies    Scheduled Medications:    desmopressin  2 mcg Intravenous Q6H    levetiracetam IVPB  500 mg Intravenous Q12H    nicotine  1 patch Transdermal Daily    sodium chloride 0.9%  3 mL Intravenous Q8H    thiamine (VITAMIN B1) IVPB  100 mg Intravenous Daily       PRN Medications: magnesium oxide, magnesium oxide, oxyCODONE-acetaminophen, potassium chloride 10%, potassium chloride 10%, potassium chloride 10%, potassium, sodium phosphates, potassium, sodium phosphates, potassium, sodium phosphates, promethazine    Family History     None        Social History Main Topics    Smoking status: Current Every Day Smoker     Packs/day: 1.00     Years: 10.00    Smokeless tobacco: Not on file    Alcohol use 0.0 oz/week     5 - 10 Cans of beer per week    Drug use: No    Sexual activity: No     Review of Systems   Constitutional: Negative for chills, fatigue and fever.   HENT: Negative for drooling, hearing loss, trouble swallowing and voice change.    Eyes: Negative for pain and visual disturbance.   Respiratory: Negative for cough, shortness of breath and wheezing.    Cardiovascular: Negative for chest pain and palpitations.   Gastrointestinal: Negative for abdominal distention, nausea and vomiting.   Genitourinary: Negative for difficulty urinating and flank pain.   Musculoskeletal: Positive for back pain and myalgias. Negative for arthralgias and neck pain.   Skin: Negative for rash and wound.   Neurological: Positive for headaches. Negative for dizziness, weakness and numbness.   Psychiatric/Behavioral: Negative for agitation and hallucinations. The patient is not nervous/anxious.      Objective:     Vital  Signs (Most Recent):  Temp: 98.4 °F (36.9 °C) (04/02/18 1100)  Pulse: 85 (04/02/18 1100)  Resp: (!) 21 (04/02/18 1100)  BP: (!) 139/94 (04/02/18 0800)  SpO2: 100 % (04/02/18 1100)    Vital Signs (24h Range):  Temp:  [98.4 °F (36.9 °C)-99 °F (37.2 °C)] 98.4 °F (36.9 °C)  Pulse:  [77-97] 85  Resp:  [14-40] 21  SpO2:  [86 %-100 %] 100 %  BP: (108-152)/() 139/94  Arterial Line BP: (120-163)/(66-89) 148/81     Body mass index is 24.48 kg/m².    Physical Exam   Constitutional: She is oriented to person, place, and time. She appears well-developed and well-nourished. She is sleeping. She is easily aroused. No distress.   HENT:   Head: Normocephalic and atraumatic.   Right Ear: External ear normal.   Left Ear: External ear normal.   Nose: Nose normal.   Eyes: Right eye exhibits no discharge. Left eye exhibits no discharge. No scleral icterus.   Neck: Normal range of motion.   Cardiovascular: Normal rate, regular rhythm and intact distal pulses.    Pulmonary/Chest: Effort normal. No respiratory distress. She has no wheezes.   Abdominal: Soft. She exhibits no distension. There is no tenderness.   Musculoskeletal: Normal range of motion. She exhibits no edema or tenderness.   Neurological: She is oriented to person, place, and time and easily aroused. She has normal strength. She exhibits normal muscle tone.   Skin: Skin is warm and dry. No rash noted.   Psychiatric: She has a normal mood and affect. Her behavior is normal. Cognition and memory are impaired.   Vitals reviewed.    NEUROLOGICAL EXAMINATION:     MENTAL STATUS   Oriented to person, place, and time.     MOTOR EXAM     Strength   Strength 5/5 throughout.       Diagnostic Results:   Labs: Reviewed  X-Ray: Reviewed  CT: Reviewed

## 2018-04-02 NOTE — PT/OT/SLP EVAL
Occupational Therapy   Evaluation    Name: Caridad Ortiz  MRN: [id number]  Admitting Diagnosis:  Acute subdural hematoma s/p fall    Recommendations:     Discharge Recommendations: home with home health  Discharge Equipment Recommendations:  none  Barriers to discharge:  None    History:     Occupational Profile:  Living Environment: Pt reported that she lives with friends on [address]. Home has 0 DWIGHT. Tub/shower combo.   Previous level of function: active and indep. Reported she had a stroke ~10 years ago with L side deficits. State she no longer is working. Wears glasses.   Roles and Routines: home and community dweller, , care taker to self  Equipment Owned:  none  Assistance upon Discharge: yes, reported friends will be able to assist    Past Medical History:   Diagnosis Date    Anxiety     Depression     History of psychiatric care     Hypertension     Psychiatric exam     Seizures        Past Surgical History:   Procedure Laterality Date    BACK SURGERY      TONSILLECTOMY         Subjective     Chief Complaint: headache  Patient/Family stated goals: none reported  Communicated with: RN prior to session. Completed with PT and PTS.  Pain/Comfort:  · Pain Rating 1:  (did not rate; c/o HA)  · Pain Addressed 1: Reposition, Pre-medicate for activity, Nurse notified  · Pain Rating Post-Intervention 1:  (remained)    Patients cultural, spiritual, Yazidi conflicts given the current situation: none reported    Objective:     Patient found with: arterial line, blood pressure cuff, peripheral IV, pulse ox (continuous), telemetry    General Precautions: Standard, fall, aspiration, seizure, NPO   Orthopedic Precautions:N/A   Braces: N/A     Occupational Performance:    Bed Mobility:    · Patient completed Rolling/Turning to Left with  supervision  · Patient completed Supine to Sit with supervision    Functional Mobility/Transfers:  · Patient completed Sit <> Stand Transfer with supervision  with  no  assistive device   · Patient completed Bed <> Chair Transfer using Stand Pivot technique with supervision with no assistive device  · Functional Mobility: Supervision for steps to chair 2/2 multiple lines; also 2/2 pt's impulsivity    Activities of Daily Living:  · UB Dressing: supervision while seated; Demo no fine motor coordination deficits- able to complete tie  · LB Dressing: supervision while seated EOB ; donning B socks in 4 point position    Cognitive/Visual Perceptual:  Cognitive/Psychosocial Skills:     -       Oriented to: Person, Place, Time and Situation   -       Follows Commands/attention:Follows multistep  commands  -       Communication: clear/fluent  -       Memory: No Deficits noted  -       Safety awareness/insight to disability: impaired   -       Mood/Affect/Coping skills/emotional control: Cooperative  Visual/Perceptual:      -Intact    Physical Exam:  Balance:    -       Supervision for safety  Postural examination/scapula alignment:    -       Rounded shoulders  -       Forward head  Skin integrity: Visible skin intact  Edema:  None noted  Sensation:    -       Intact  Motor Planning:    -       Intact  Dominant hand:    -       L  Upper Extremity Range of Motion:     -       Right Upper Extremity: WFL  -       Left Upper Extremity: WFL  Upper Extremity Strength:    -       Right Upper Extremity: WFL  -       Left Upper Extremity: WFL   Strength:    -       Right Upper Extremity: WFL  -       Left Upper Extremity: WFL  Fine Motor Coordination:    -       Intact  Left hand, finger to nose, Right hand, finger to nose, Left hand thumb/finger opposition skills and Right hand thumb/finger opposition skills  Gross motor coordination:   WFL    Patient left up in chair with all lines intact, call button in reach and RN notified    AMPAC 6 Click:  AMPAC Total Score: 23     Clock Draw Assessment: Pt issued sheet of paper and asked to draw clock with all # located appropriately. Pt asked to model  "clock to report time as 10 past 11. Pt demo intact executive functioning for time portion, intact visual spatial skills for # organization and WFL fine motor skills for dominant hand (L)      Treatment & Education:  -Pt alert and agreeable to therapy eval; edu on OT role in care  -Communication board updated; no family/friends at bedside for education   -Edu on safety and need for staff assistance when OOB/to bathroom; edu on being up to chair for all meals   Education:    Assessment:     Caridad Ortiz is a 53 y.o. female with a medical diagnosis of Acute subdural hematoma.  She presents with brain compression following injury and noted midline shift on scan. She demo supervision level of assistance for functional mobility and demo no visuo spatial or fine motor/gross motor deficits upon exam on this date. OT to follow up 3x in acute care for best functional outcome and safety.  Performance deficits affecting function are impaired endurance, impaired functional mobilty, impaired self care skills, decreased safety awareness, pain.      Rehab Prognosis:  Good; patient would benefit from acute skilled OT services to address these deficits and reach maximum level of function.         Clinical Decision Makin.  OT Low:  "Pt evaluation falls under low complexity for evaluation coding due to performance deficits noted in 1-3 areas as stated above and 0 co-morbities affecting current functional status. Data obtained from problem focused assessments. No modifications or assistance was required for completion of evaluation. Only brief occupational profile and history review completed."     Plan:     Patient to be seen 3 x/week to address the above listed problems via self-care/home management, therapeutic activities, neuromuscular re-education  · Plan of Care Expires: 18  · Plan of Care Reviewed with: patient    This Plan of care has been discussed with the patient who was involved in its development and " understands and is in agreement with the identified goals and treatment plan    GOALS:    Occupational Therapy Goals        Problem: Occupational Therapy Goal    Goal Priority Disciplines Outcome Interventions   Occupational Therapy Goal     OT, PT/OT Ongoing (interventions implemented as appropriate)    Description:  Goals to be met by: 4/9   Patient will increase functional independence with ADLs by performing:    Grooming while standing at sink with Supervision.  Toileting from toilet with Supervision for hygiene and clothing management.   Toilet transfer to toilet with Supervision.  Functional mobility at household distance for ADL task with supervision assistance.   Pt will complete dynamic standing task to simulate home mgmt task with supervision for 8 min duration.                    Time Tracking:     OT Date of Treatment: 04/02/18  OT Start Time: 0812  OT Stop Time: 0826  OT Total Time (min): 14 min    Billable Minutes:Evaluation 12    LUCY Lopez  4/2/2018

## 2018-04-02 NOTE — HOSPITAL COURSE
--3/31--once safely in ICU, sedation stopped and patient successfully extubated to room air. Repeat imaging of head stable and neuro exam without decline  --sodium chronically low , 110 on admission. Over corrected since admission with NS. D5W gtt today and DDAVP . Stable neuro exam although patient complains of chronic pain. Restarting home gabapentin. Following Na q6 with goal to low 120s by end of the day. Most recent Na 127.   4/3:  continue Desmopressin x 1 more day, NA goal mid 120's, 1 liter fluid restriction, + amlodipine   4/4: stop desmopressin, change pain meds to q 4, na q 3 follow urine lytes     INTERVAL HISTORY- NAEON. Endocrine consulted for assistance with sodium management. From neurologic perspective, stable to step down to floor under NSGY service.

## 2018-04-02 NOTE — PLAN OF CARE
[facility] - [place], [place] - [address]  [address]  [place] [address]  Phone: [phone] Fax: [phone]    This Cm spoke with patient at bedside. She states she resides with her friend Traci. No additional family information given. Patient states her PCP is Juancho PAZ.       04/02/18 1325   Discharge Assessment   Assessment Type Discharge Planning Assessment   Confirmed/corrected address and phone number on facesheet? Yes   Assessment information obtained from? Patient   Expected Length of Stay (days) 3   Communicated expected length of stay with patient/caregiver yes   Prior to hospitilization cognitive status: Alert/Oriented   Prior to hospitalization functional status: Independent   Current cognitive status: Alert/Oriented   Current Functional Status: Needs Assistance   Facility Arrived From: ( transfer from [facility] in [facility] )   Lives With friend(s)   Able to Return to Prior Arrangements yes   Is patient able to care for self after discharge? Unable to determine at this time (comments)   Who are your caregiver(s) and their phone number(s)? (friend Traci Chester [phone])   Patient's perception of discharge disposition home or selfcare   Readmission Within The Last 30 Days no previous admission in last 30 days   Patient currently being followed by outpatient case management? No   Patient currently receives any other outside agency services? No   Equipment Currently Used at Home none   Do you have any problems affording any of your prescribed medications? No   Is the patient taking medications as prescribed? yes   Does the patient have transportation home? Yes   Transportation Available family or friend will provide   Does the patient receive services at the [facility]? No   Discharge Plan A Home   Discharge Plan B Home;Home Health   Patient/Family In Agreement With Plan yes     Michelle Johnson RN/BSN/CM  [phone]  [facility]

## 2018-04-02 NOTE — ASSESSMENT & PLAN NOTE
-  CTH R SDH  -  Seizure following head injury  -  Neurosurgery following--> no acute surgical intervention  See hospital course for functional, cognitive/speech/language, and nutrition/swallow status.      Recommendations  -  Monitor sleep disturbances and establish consistent sleep-wake cycle  ·  Day time- lights on and shades open, night time- lights dim/off  -  Environmental modifications to limit agitation/confusion   · Appropriate lighting, family at bedside, visible clock and calendar, updated white board, reduce noise, limited visitors, clustered nursing care  -  Reorient patient to person, place, time, and situation on each encounter  -  Avoid restraints  -  May benefit from 24/7 supervision  -  Avoid/limit medications that can worsen delirium (benzodiazepines, antihistamines, anticholinergics, hypnotics, opiates)  -  Encourage mobility, OOB in chair, and early ambulation as appropriate  -  PT/OT evaluate and treat  -  SLP speech and cognitive evaluate and treat  -  Monitor for bowel and bladder dysfunction  -  Monitor for and prevent skin breakdown and pressure ulcers  · Early mobility, repositioning/weight shifting every 20-30 minutes when sitting, turn patient every 2 hours, proper mattress/overlay and chair cushioning, pressure relief/heel protector boots  -  DVT prophylaxis:  JASKARAN, SCD

## 2018-04-02 NOTE — PLAN OF CARE
Problem: Physical Therapy Goal  Goal: Physical Therapy Goal  Goals to be met by: 2018     Patient will increase functional independence with mobility by performin. Sit to stand transfer with Modified [place]  2. Gait  x 200 feet with Supervision without AD.   3. Lower extremity exercise program x15 reps per handout, with supervision    Outcome: Ongoing (interventions implemented as appropriate)  Pt evaluation complete; pt goals set.     ROSALINDA PINTO, PT  2018

## 2018-04-02 NOTE — PROGRESS NOTES
[facility]  Neurocritical Care  Progress Note    Admit Date: 3/31/2018  Service Date: 04/02/2018  Length of Stay: 2    Subjective:     Chief Complaint: Acute subdural hematoma    History of Present Illness: Caridad Ortiz is a 53 y.o. female with PHMx EtOH abuse, depression, hypothyroidism who presents as transfer from [facility] in [facility] for witnessed seizure. She was provided with versed and transported here when workup noted an ICH. In addition she may have a history of seizures based on documentation from OSH and a negative dilantin level was drawn but no dilantin or other AEDs were found in home medications sent along with her. On arrival NSGY was consulted emergently and are currently evaluating the patient.    Of note the patients medications have a bottle of gabapentin and a bottle of tizanidine, both filled at the beginning of the month, that are empty.     HPI is based off of recollections from other providers including EMS and ED providers as well as the medical record due to intubation.     Hospital Course: --3/31--once safely in ICU, sedation stopped and patient successfully extubated to room air. Repeat imaging of head stable and neuro exam without decline    INTERVAL HISTORY--sodium chronically low , 110 on admission. Over corrected since admission with NS. D5W gtt today and DDAVP . Stable neuro exam although patient complains of chronic pain. Restarting home gabapentin. Following Na q6 with goal to low 120s by end of the day. Most recent Na 127.         Review of Systems:Constitutional: Denies fevers or chills.  Pulmonary: Denies shortness of breath or cough.  Cardiology: Denies chest pain or palpitations.  GI: Denies abdominal pain or constipation.  Neurologic: Denies new weakness, headache, or paresthesias.  MS- reports consistent chronic back pain     Vitals:   Temp: 98.4 °F (36.9 °C)  Pulse: 85  Rhythm: normal sinus rhythm  BP: 133/85  MAP (mmHg):  106  Resp: 16  SpO2: 100 %  O2 Device (Oxygen Therapy): nasal cannula    Temp  Min: 98.4 °F (36.9 °C)  Max: 99 °F (37.2 °C)  Pulse  Min: 77  Max: 97  BP  Min: 108/82  Max: 152/91  MAP (mmHg)  Min: 92  Max: 119  Resp  Min: 14  Max: 40  SpO2  Min: 86 %  Max: 100 %    04/01 0701 - 04/02 0700  In: 1325 [P.O.:100; I.V.:725]  Out: 2300 [Urine:2300]   Unmeasured Output  Urine Occurrence: 1  Stool Occurrence: 0     Examination:   Constitutional: Well-nourished and -developed. No apparent distress.   Eyes: Conjunctiva clear, anicteric. Lids no lesions.  Head/Ears/Nose/Mouth/Throat/Neck: Moist mucous membranes. External ears, nose atraumatic.   Cardiovascular: Regular rhythm. No murmurs. No leg edema.  Respiratory: Comfortable respirations. Clear to auscultation.  Gastrointestinal: No hernia. Soft, nondistended, nontender. + bowel sounds.    Neurologic:  -GCS 15  -Alert. Oriented to person, place, and time. Speech fluent. Follows commands.  -Cranial nerves intact, except for slight L facial droop  --normal tone through out, 5/5 strength and sensation throughout      Medications:   Continuous    dextrose 5 % Last Rate: 75 mL/hr at 04/02/18 1300   Scheduled    desmopressin 2 mcg Q6H   folic acid 1 mg Daily   gabapentin 300 mg TID   heparin (porcine) 5,000 Units Q8H   levothyroxine 75 mcg Before breakfast   nicotine 1 patch Daily   phenytoin 100 mg TID   sodium chloride 0.9% 3 mL Q8H   [START ON 4/3/2018] thiamine 100 mg Daily   PRN    oxyCODONE-acetaminophen 1 tablet Q6H PRN      Today I independently reviewed pertinent medications, lines/drains/airways, imaging, cardiology, lab results, notably: Ct head, sodium levels  Assessment/Plan:     Neuro   * Acute subdural hematoma    · First noted on OSH CT AM of 3/31  · Repeat imaging grossly stable.  · NSGY consulted, recommendations appreciated.  · Extubated, stable neurologicially  · Sodium over corrected, monitor in ICU to monitor Na q6 while adjusting levels  · Monitor for signs  of ODS  · Fluid restrict         Seizure after head injury    · Patient has reported history of prior seizure disorder(-partial complex)  · Convert IV keppra to home dose of PO dilantin, follow up AM level  · Seizure precautions.          Renal/   Hyponatremia      · Heavy beer drinker, likely secondary to potomania  · D5 at 50cc/hr, DDAVP and fluid restrict as above to avoid risk of osmotic demylelination syndrome with overcorrection on admission   · Follow sodiums q6            Prophylaxis:  Venous Thromboembolism: mechanical chemical  Stress Ulcer: None  Ventilator Pneumonia: not applicable     Activity Orders          Up with assistance starting at 03/31 1345    Out of bed to chair up to 3x daily starting at 03/31 1300        Full Code    Yaneth Cortez PA-C  Neurocritical Care  [facility]

## 2018-04-03 LAB
ALBUMIN SERPL BCP-MCNC: 2.8 G/DL
ALP SERPL-CCNC: 55 U/L
ALT SERPL W/O P-5'-P-CCNC: 12 U/L
ALUMINUM SERPL-MCNC: 8 NG/ML
ANION GAP SERPL CALC-SCNC: 3 MMOL/L
AST SERPL-CCNC: 16 U/L
BASOPHILS # BLD AUTO: 0.04 K/UL
BASOPHILS NFR BLD: 0.5 %
BILIRUB SERPL-MCNC: 0.4 MG/DL
BUN SERPL-MCNC: 5 MG/DL
CALCIUM SERPL-MCNC: 8.1 MG/DL
CHLORIDE SERPL-SCNC: 95 MMOL/L
CO2 SERPL-SCNC: 25 MMOL/L
CREAT SERPL-MCNC: 0.4 MG/DL
DIFFERENTIAL METHOD: ABNORMAL
EOSINOPHIL # BLD AUTO: 0.2 K/UL
EOSINOPHIL NFR BLD: 1.7 %
ERYTHROCYTE [DISTWIDTH] IN BLOOD BY AUTOMATED COUNT: 12 %
EST. GFR  (AFRICAN AMERICAN): >60 ML/MIN/1.73 M^2
EST. GFR  (NON AFRICAN AMERICAN): >60 ML/MIN/1.73 M^2
GLUCOSE SERPL-MCNC: 96 MG/DL
HCT VFR BLD AUTO: 26.8 %
HGB BLD-MCNC: 9.3 G/DL
IMM GRANULOCYTES # BLD AUTO: 0.02 K/UL
IMM GRANULOCYTES NFR BLD AUTO: 0.2 %
LYMPHOCYTES # BLD AUTO: 2.5 K/UL
LYMPHOCYTES NFR BLD: 28.7 %
MAGNESIUM SERPL-MCNC: 1.7 MG/DL
MCH RBC QN AUTO: 29.7 PG
MCHC RBC AUTO-ENTMCNC: 34.7 G/DL
MCV RBC AUTO: 86 FL
MONOCYTES # BLD AUTO: 0.5 K/UL
MONOCYTES NFR BLD: 5.8 %
NEUTROPHILS # BLD AUTO: 5.5 K/UL
NEUTROPHILS NFR BLD: 63.1 %
NRBC BLD-RTO: 0 /100 WBC
PHENYTOIN SERPL-MCNC: 1.2 UG/ML
PHOSPHATE SERPL-MCNC: 1.9 MG/DL
PLATELET # BLD AUTO: 203 K/UL
PMV BLD AUTO: 11.1 FL
POCT GLUCOSE: 105 MG/DL (ref 70–110)
POCT GLUCOSE: 90 MG/DL (ref 70–110)
POTASSIUM SERPL-SCNC: 4.5 MMOL/L
POTASSIUM UR-SCNC: 54 MMOL/L
PROT SERPL-MCNC: 4.9 G/DL
RBC # BLD AUTO: 3.13 M/UL
SODIUM SERPL-SCNC: 112 MMOL/L
SODIUM SERPL-SCNC: 114 MMOL/L
SODIUM SERPL-SCNC: 117 MMOL/L
SODIUM SERPL-SCNC: 117 MMOL/L
SODIUM SERPL-SCNC: 121 MMOL/L
SODIUM SERPL-SCNC: 123 MMOL/L
SODIUM UR-SCNC: 236 MMOL/L
WBC # BLD AUTO: 8.75 K/UL

## 2018-04-03 PROCEDURE — 99233 SBSQ HOSP IP/OBS HIGH 50: CPT | Mod: ,,, | Performed by: NURSE PRACTITIONER

## 2018-04-03 PROCEDURE — 83735 ASSAY OF MAGNESIUM: CPT

## 2018-04-03 PROCEDURE — 84295 ASSAY OF SERUM SODIUM: CPT

## 2018-04-03 PROCEDURE — 25000003 PHARM REV CODE 250: Performed by: ANESTHESIOLOGY

## 2018-04-03 PROCEDURE — 80185 ASSAY OF PHENYTOIN TOTAL: CPT

## 2018-04-03 PROCEDURE — 25000003 PHARM REV CODE 250: Performed by: STUDENT IN AN ORGANIZED HEALTH CARE EDUCATION/TRAINING PROGRAM

## 2018-04-03 PROCEDURE — 84133 ASSAY OF URINE POTASSIUM: CPT

## 2018-04-03 PROCEDURE — S4991 NICOTINE PATCH NONLEGEND: HCPCS | Performed by: ANESTHESIOLOGY

## 2018-04-03 PROCEDURE — 84300 ASSAY OF URINE SODIUM: CPT

## 2018-04-03 PROCEDURE — 94761 N-INVAS EAR/PLS OXIMETRY MLT: CPT

## 2018-04-03 PROCEDURE — 25000003 PHARM REV CODE 250: Performed by: NURSE PRACTITIONER

## 2018-04-03 PROCEDURE — 63600175 PHARM REV CODE 636 W HCPCS: Mod: JG | Performed by: PSYCHIATRY & NEUROLOGY

## 2018-04-03 PROCEDURE — 63600175 PHARM REV CODE 636 W HCPCS: Performed by: PHYSICIAN ASSISTANT

## 2018-04-03 PROCEDURE — 84295 ASSAY OF SERUM SODIUM: CPT | Mod: 91

## 2018-04-03 PROCEDURE — 63600175 PHARM REV CODE 636 W HCPCS

## 2018-04-03 PROCEDURE — 25000003 PHARM REV CODE 250: Performed by: PSYCHIATRY & NEUROLOGY

## 2018-04-03 PROCEDURE — 20000000 HC ICU ROOM

## 2018-04-03 PROCEDURE — 80053 COMPREHEN METABOLIC PANEL: CPT

## 2018-04-03 PROCEDURE — 63600175 PHARM REV CODE 636 W HCPCS: Performed by: PSYCHIATRY & NEUROLOGY

## 2018-04-03 PROCEDURE — A4216 STERILE WATER/SALINE, 10 ML: HCPCS | Performed by: STUDENT IN AN ORGANIZED HEALTH CARE EDUCATION/TRAINING PROGRAM

## 2018-04-03 PROCEDURE — 25000003 PHARM REV CODE 250: Performed by: PHYSICIAN ASSISTANT

## 2018-04-03 PROCEDURE — 83935 ASSAY OF URINE OSMOLALITY: CPT

## 2018-04-03 PROCEDURE — 99232 SBSQ HOSP IP/OBS MODERATE 35: CPT | Mod: ,,, | Performed by: NEUROLOGICAL SURGERY

## 2018-04-03 PROCEDURE — 84100 ASSAY OF PHOSPHORUS: CPT

## 2018-04-03 PROCEDURE — 85025 COMPLETE CBC W/AUTO DIFF WBC: CPT

## 2018-04-03 PROCEDURE — 63600175 PHARM REV CODE 636 W HCPCS: Performed by: NURSE PRACTITIONER

## 2018-04-03 RX ORDER — PHENYTOIN SODIUM 100 MG/1
100 CAPSULE, EXTENDED RELEASE ORAL DAILY
Status: DISCONTINUED | OUTPATIENT
Start: 2018-04-03 | End: 2018-04-07 | Stop reason: HOSPADM

## 2018-04-03 RX ORDER — HYDRALAZINE HYDROCHLORIDE 20 MG/ML
10 INJECTION INTRAMUSCULAR; INTRAVENOUS EVERY 4 HOURS PRN
Status: DISCONTINUED | OUTPATIENT
Start: 2018-04-03 | End: 2018-04-05

## 2018-04-03 RX ORDER — SODIUM CHLORIDE 9 MG/ML
INJECTION, SOLUTION INTRAVENOUS CONTINUOUS
Status: DISCONTINUED | OUTPATIENT
Start: 2018-04-03 | End: 2018-04-05

## 2018-04-03 RX ORDER — PHENYTOIN SODIUM 100 MG/1
200 CAPSULE, EXTENDED RELEASE ORAL 2 TIMES DAILY
Status: DISCONTINUED | OUTPATIENT
Start: 2018-04-03 | End: 2018-04-07 | Stop reason: HOSPADM

## 2018-04-03 RX ORDER — FENTANYL CITRATE/PF 250MCG/5ML
12.5 SYRINGE (ML) INTRAVENOUS ONCE
Status: DISCONTINUED | OUTPATIENT
Start: 2018-04-03 | End: 2018-04-03

## 2018-04-03 RX ORDER — AMLODIPINE BESYLATE 10 MG/1
10 TABLET ORAL DAILY
Status: DISCONTINUED | OUTPATIENT
Start: 2018-04-03 | End: 2018-04-07 | Stop reason: HOSPADM

## 2018-04-03 RX ORDER — FENTANYL CITRATE 50 UG/ML
12.5 INJECTION, SOLUTION INTRAMUSCULAR; INTRAVENOUS ONCE
Status: COMPLETED | OUTPATIENT
Start: 2018-04-03 | End: 2018-04-03

## 2018-04-03 RX ORDER — DESMOPRESSIN ACETATE 4 UG/ML
2 INJECTION, SOLUTION INTRAVENOUS; SUBCUTANEOUS EVERY 6 HOURS
Status: DISCONTINUED | OUTPATIENT
Start: 2018-04-03 | End: 2018-04-03

## 2018-04-03 RX ORDER — ONDANSETRON 8 MG/1
8 TABLET, ORALLY DISINTEGRATING ORAL EVERY 8 HOURS PRN
Status: DISCONTINUED | OUTPATIENT
Start: 2018-04-03 | End: 2018-04-07 | Stop reason: HOSPADM

## 2018-04-03 RX ORDER — FENTANYL CITRATE 50 UG/ML
INJECTION, SOLUTION INTRAMUSCULAR; INTRAVENOUS
Status: COMPLETED
Start: 2018-04-03 | End: 2018-04-03

## 2018-04-03 RX ORDER — LABETALOL HYDROCHLORIDE 5 MG/ML
10 INJECTION, SOLUTION INTRAVENOUS EVERY 4 HOURS PRN
Status: DISCONTINUED | OUTPATIENT
Start: 2018-04-03 | End: 2018-04-07 | Stop reason: HOSPADM

## 2018-04-03 RX ORDER — METOCLOPRAMIDE HYDROCHLORIDE 5 MG/ML
10 INJECTION INTRAMUSCULAR; INTRAVENOUS EVERY 6 HOURS PRN
Status: DISCONTINUED | OUTPATIENT
Start: 2018-04-03 | End: 2018-04-03

## 2018-04-03 RX ORDER — LISINOPRIL 10 MG/1
10 TABLET ORAL DAILY
Status: DISCONTINUED | OUTPATIENT
Start: 2018-04-03 | End: 2018-04-07 | Stop reason: HOSPADM

## 2018-04-03 RX ADMIN — AMLODIPINE BESYLATE 10 MG: 10 TABLET ORAL at 11:04

## 2018-04-03 RX ADMIN — PHENYTOIN SODIUM 200 MG: 100 CAPSULE ORAL at 09:04

## 2018-04-03 RX ADMIN — DESMOPRESSIN ACETATE 2 MCG: 4 SOLUTION INTRAVENOUS at 12:04

## 2018-04-03 RX ADMIN — GABAPENTIN 300 MG: 300 CAPSULE ORAL at 09:04

## 2018-04-03 RX ADMIN — OXYCODONE AND ACETAMINOPHEN 1 TABLET: 10; 325 TABLET ORAL at 07:04

## 2018-04-03 RX ADMIN — ONDANSETRON 8 MG: 8 TABLET, ORALLY DISINTEGRATING ORAL at 04:04

## 2018-04-03 RX ADMIN — GABAPENTIN 300 MG: 300 CAPSULE ORAL at 08:04

## 2018-04-03 RX ADMIN — NICOTINE 1 PATCH: 21 PATCH, EXTENDED RELEASE TRANSDERMAL at 07:04

## 2018-04-03 RX ADMIN — PHENYTOIN SODIUM 100 MG: 100 CAPSULE ORAL at 12:04

## 2018-04-03 RX ADMIN — HEPARIN SODIUM 5000 UNITS: 5000 INJECTION, SOLUTION INTRAVENOUS; SUBCUTANEOUS at 09:04

## 2018-04-03 RX ADMIN — PHENYTOIN SODIUM 100 MG: 100 CAPSULE ORAL at 08:04

## 2018-04-03 RX ADMIN — DESMOPRESSIN ACETATE 2 MCG: 4 SOLUTION INTRAVENOUS at 06:04

## 2018-04-03 RX ADMIN — FENTANYL CITRATE 12.5 MCG: 50 INJECTION, SOLUTION INTRAMUSCULAR; INTRAVENOUS at 02:04

## 2018-04-03 RX ADMIN — GABAPENTIN 300 MG: 300 CAPSULE ORAL at 03:04

## 2018-04-03 RX ADMIN — OXYCODONE AND ACETAMINOPHEN 1 TABLET: 10; 325 TABLET ORAL at 01:04

## 2018-04-03 RX ADMIN — SODIUM CHLORIDE: 0.9 INJECTION, SOLUTION INTRAVENOUS at 06:04

## 2018-04-03 RX ADMIN — SODIUM CHLORIDE: 0.9 INJECTION, SOLUTION INTRAVENOUS at 08:04

## 2018-04-03 RX ADMIN — HYDRALAZINE HYDROCHLORIDE 10 MG: 20 INJECTION INTRAMUSCULAR; INTRAVENOUS at 01:04

## 2018-04-03 RX ADMIN — Medication 3 ML: at 09:04

## 2018-04-03 RX ADMIN — LISINOPRIL 10 MG: 10 TABLET ORAL at 11:04

## 2018-04-03 RX ADMIN — Medication 3 ML: at 01:04

## 2018-04-03 RX ADMIN — FOLIC ACID 1 MG: 1 TABLET ORAL at 08:04

## 2018-04-03 RX ADMIN — DESMOPRESSIN ACETATE 2 MCG: 4 SOLUTION INTRAVENOUS at 05:04

## 2018-04-03 RX ADMIN — LEVOTHYROXINE SODIUM 75 MCG: 75 TABLET ORAL at 05:04

## 2018-04-03 RX ADMIN — Medication 100 MG: at 08:04

## 2018-04-03 RX ADMIN — HEPARIN SODIUM 5000 UNITS: 5000 INJECTION, SOLUTION INTRAVENOUS; SUBCUTANEOUS at 05:04

## 2018-04-03 RX ADMIN — LABETALOL HYDROCHLORIDE 10 MG: 5 INJECTION, SOLUTION INTRAVENOUS at 09:04

## 2018-04-03 RX ADMIN — HEPARIN SODIUM 5000 UNITS: 5000 INJECTION, SOLUTION INTRAVENOUS; SUBCUTANEOUS at 01:04

## 2018-04-03 RX ADMIN — DESMOPRESSIN ACETATE 2 MCG: 4 SOLUTION INTRAVENOUS at 01:04

## 2018-04-03 RX ADMIN — LABETALOL HYDROCHLORIDE 10 MG: 5 INJECTION, SOLUTION INTRAVENOUS at 12:04

## 2018-04-03 RX ADMIN — FENTANYL CITRATE 12.5 MCG: 50 INJECTION, SOLUTION INTRAMUSCULAR; INTRAVENOUS at 03:04

## 2018-04-03 RX ADMIN — OXYCODONE AND ACETAMINOPHEN 1 TABLET: 10; 325 TABLET ORAL at 11:04

## 2018-04-03 RX ADMIN — Medication 3 ML: at 05:04

## 2018-04-03 RX ADMIN — OXYCODONE AND ACETAMINOPHEN 1 TABLET: 10; 325 TABLET ORAL at 04:04

## 2018-04-03 NOTE — ASSESSMENT & PLAN NOTE
53F PMH presenting with R acute SDH. Neurointact.    -- Will monitor closely for need to evac SDH.  -- CT head this AM with stable SDH.   -- Correct hyponatremia per NCC  -- SBP <160.  -- HOB >30.  -- Pain control.   -- OK to TT neurosurgery service.  -- Neurochecks q4h, call NSGY immediately with decline in exam.  -- Medical management per NCC.

## 2018-04-03 NOTE — NURSING
Patient is complaining of nausea and vomited once with severe frontal headache. Donte SAHNI was notified and ordered PRN meds for nausea and vomiting and stated to give PRN medication of pain earlier.

## 2018-04-03 NOTE — NURSING
0924H- Patient's SBP >160, Donte SAHNI was notified and stated to give another 10mg of Labatalol IV

## 2018-04-03 NOTE — NURSING
1242H- patient is complaining of severe frontal headache, Due PRN medications was given. Donte SAHNI was informed and ordered to give 12.5mg of fentanyl IV now.

## 2018-04-03 NOTE — SUBJECTIVE & OBJECTIVE
Interval History:   continue Desmopressin x 1 more day, NA goal mid 120's, 1 liter fluid restriction, + amlodipine     Review of Systems    Review of symptoms  Constitutional: Denies fevers or chills.  Pulmonary: Denies shortness of breath or cough.  Cardiology: Denies chest pain or palpitations.  GI: Denies abdominal pain or constipation.  Neurologic: Denies new weakness or paresthesias.    + headache   Objective:     Vitals:  Temp: 98.9 °F (37.2 °C)  Pulse: 72  Rhythm: normal sinus rhythm  BP: (!) 154/86  MAP (mmHg): 128  Resp: 16  SpO2: 100 %  O2 Device (Oxygen Therapy): room air    Temp  Min: 98.5 °F (36.9 °C)  Max: 98.9 °F (37.2 °C)  Pulse  Min: 67  Max: 96  BP  Min: 122/90  Max: 160/95  MAP (mmHg)  Min: 95  Max: 128  Resp  Min: 12  Max: 42  SpO2  Min: 99 %  Max: 100 %    04/02 0701 - 04/03 0700  In: 2315 [P.O.:990; I.V.:1175]  Out: 1650 [Urine:1650]   Unmeasured Output  Urine Occurrence: 1  Stool Occurrence: 0       Physical Exam       Physical Exam:  GA: Alert, comfortable, no acute distress.   HEENT: No scleral icterus or JVD.   Pulmonary: Clear to auscultation A/P/L. No wheezing, crackles, or rhonchi.  Cardiac: RRR S1 & S2 w/o rubs/murmurs/gallops.   Abdominal: Bowel sounds present x 4. No appreciable hepatosplenomegaly.  Skin: No jaundice, rashes, or visible lesions.  Neuro:  --GCS: E4 V5 M6  --Mental Status:  Awake alert oriented follows all commands   --CN II-XII grossly intact.   --Pupils 3mm, PERRL.   --Corneal reflex, gag, cough intact.  --LUE strength: 5/5  --RUE strength: 5/5  --LLE strength: 5/5  --RLE strength: 5/5    Unable to test gait due to level of consciousness.    Medications:  Continuous  sodium chloride 0.9% Last Rate: 75 mL/hr at 04/03/18 1201   Scheduled  amLODIPine 10 mg Daily   desmopressin 2 mcg Q6H   folic acid 1 mg Daily   gabapentin 300 mg TID   heparin (porcine) 5,000 Units Q8H   levothyroxine 75 mcg Before breakfast   lisinopril 10 mg Daily   nicotine 1 patch Daily    phenytoin 200 mg BID   And     phenytoin 100 mg Daily   sodium chloride 0.9% 3 mL Q8H   thiamine 100 mg Daily   PRN  hydrALAZINE 10 mg Q4H PRN   labetalol 10 mg Q4H PRN   oxyCODONE-acetaminophen 1 tablet Q6H PRN     Today I personally reviewed pertinent medications, lines/drains/airways, imaging, lab results,     Diet  Diet Low Sodium, 2gm  Diet Low Sodium, 2gm

## 2018-04-03 NOTE — ASSESSMENT & PLAN NOTE
-Heavy beer drinker, likely secondary to potomania  -NSat 75cc/hr, DDAVP and fluid restrict as above to avoid risk of osmotic demylelination syndrome with overcorrection on admission   -Follow sodiums q6  - Goal 120's  - Desmopressin x 1 more day

## 2018-04-03 NOTE — ASSESSMENT & PLAN NOTE
-First noted on OSH CT AM of 3/31  -Repeat imaging grossly stable.  -NSGY consulted, recommendations appreciated.  -Extubated, stable neurologicially  -Sodium over corrected, monitor in ICU to monitor Na q6 while adjusting levels  -Monitor for signs of ODS  -Fluid restrict   - CTH - Stable

## 2018-04-03 NOTE — PROGRESS NOTES
[facility]  Neurocritical Care  Progress Note    Admit Date: 3/31/2018  Service Date: 04/03/2018  Length of Stay: 3    Subjective:     Chief Complaint: Acute subdural hematoma    History of Present Illness: Caridad Ortiz is a 53 y.o. female with PHMx EtOH abuse, depression, hypothyroidism who presents as transfer from [facility] in [facility] for witnessed seizure. She was provided with versed and transported here when workup noted an ICH. In addition she may have a history of seizures based on documentation from OSH and a negative dilantin level was drawn but no dilantin or other AEDs were found in home medications sent along with her. On arrival NSGY was consulted emergently and are currently evaluating the patient.    Of note the patients medications have a bottle of gabapentin and a bottle of tizanidine, both filled at the beginning of the month, that are empty.     HPI is based off of recollections from other providers including EMS and ED providers as well as the medical record due to intubation.     Hospital Course: --3/31--once safely in ICU, sedation stopped and patient successfully extubated to room air. Repeat imaging of head stable and neuro exam without decline  --sodium chronically low , 110 on admission. Over corrected since admission with NS. D5W gtt today and DDAVP . Stable neuro exam although patient complains of chronic pain. Restarting home gabapentin. Following Na q6 with goal to low 120s by end of the day. Most recent Na 127.   4/3:  continue Desmopressin x 1 more day, NA goal mid 120's, 1 liter fluid restriction, + amlodipine     Interval History:   continue Desmopressin x 1 more day, NA goal mid 120's, 1 liter fluid restriction, + amlodipine     Review of Systems    Review of symptoms  Constitutional: Denies fevers or chills.  Pulmonary: Denies shortness of breath or cough.  Cardiology: Denies chest pain or palpitations.  GI: Denies abdominal  pain or constipation.  Neurologic: Denies new weakness or paresthesias.    + headache   Objective:     Vitals:  Temp: 98.9 °F (37.2 °C)  Pulse: 72  Rhythm: normal sinus rhythm  BP: (!) 154/86  MAP (mmHg): 128  Resp: 16  SpO2: 100 %  O2 Device (Oxygen Therapy): room air    Temp  Min: 98.5 °F (36.9 °C)  Max: 98.9 °F (37.2 °C)  Pulse  Min: 67  Max: 96  BP  Min: 122/90  Max: 160/95  MAP (mmHg)  Min: 95  Max: 128  Resp  Min: 12  Max: 42  SpO2  Min: 99 %  Max: 100 %    04/02 0701 - 04/03 0700  In: 2315 [P.O.:990; I.V.:1175]  Out: 1650 [Urine:1650]   Unmeasured Output  Urine Occurrence: 1  Stool Occurrence: 0       Physical Exam       Physical Exam:  GA: Alert, comfortable, no acute distress.   HEENT: No scleral icterus or JVD.   Pulmonary: Clear to auscultation A/P/L. No wheezing, crackles, or rhonchi.  Cardiac: RRR S1 & S2 w/o rubs/murmurs/gallops.   Abdominal: Bowel sounds present x 4. No appreciable hepatosplenomegaly.  Skin: No jaundice, rashes, or visible lesions.  Neuro:  --GCS: E4 V5 M6  --Mental Status:  Awake alert oriented follows all commands   --CN II-XII grossly intact.   --Pupils 3mm, PERRL.   --Corneal reflex, gag, cough intact.  --LUE strength: 5/5  --RUE strength: 5/5  --LLE strength: 5/5  --RLE strength: 5/5    Unable to test gait due to level of consciousness.    Medications:  Continuous  sodium chloride 0.9% Last Rate: 75 mL/hr at 04/03/18 1201   Scheduled  amLODIPine 10 mg Daily   desmopressin 2 mcg Q6H   folic acid 1 mg Daily   gabapentin 300 mg TID   heparin (porcine) 5,000 Units Q8H   levothyroxine 75 mcg Before breakfast   lisinopril 10 mg Daily   nicotine 1 patch Daily   phenytoin 200 mg BID   And     phenytoin 100 mg Daily   sodium chloride 0.9% 3 mL Q8H   thiamine 100 mg Daily   PRN  hydrALAZINE 10 mg Q4H PRN   labetalol 10 mg Q4H PRN   oxyCODONE-acetaminophen 1 tablet Q6H PRN     Today I personally reviewed pertinent medications, lines/drains/airways, imaging, lab results,     Diet  Diet Low  Sodium, 2gm  Diet Low Sodium, 2gm        Assessment/Plan:     Neuro   * Acute subdural hematoma    -First noted on OSH CT AM of 3/31  -Repeat imaging grossly stable.  -NSGY consulted, recommendations appreciated.  -Extubated, stable neurologicially  -Sodium over corrected, monitor in ICU to monitor Na q6 while adjusting levels  -Monitor for signs of ODS  -Fluid restrict   - CTH - Stable         Brain compression    -F/u q6h sodium checks  -q1h neuro exams  - repeat CTH stable   - NA goals 120's        Seizure after head injury    -Patient has reported history of prior seizure disorder(-partial complex)  -Convert IV keppra to home dose of PO dilantin, follow up AM level  -Seizure precautions.  - Currently no SZ        Renal/   Hyponatremia      -Heavy beer drinker, likely secondary to potomania  -NSat 75cc/hr, DDAVP and fluid restrict as above to avoid risk of osmotic demylelination syndrome with overcorrection on admission   -Follow sodiums q6  - Goal 120's  - Desmopressin x 1 more day             Prophylaxis:  Venous Thromboembolism: mechanical chemical  Stress Ulcer: None  Ventilator Pneumonia: not applicable     Activity Orders          Up with assistance starting at 03/31 1345    Out of bed to chair up to 3x daily starting at 03/31 1300        Full Code    Donte Abdi NP  Neurocritical Care  [facility]

## 2018-04-03 NOTE — NURSING
0855H- Patient is complaining of headache SBO between 160s-170s, neurologically intacts, no drift but with unequal pupils Right is bigger than the Left. Radhames SAHNI was notified, Labatalol PRN IV was ordered and was given and a STAT CT scan of head was done. Will monitor patient.

## 2018-04-03 NOTE — PLAN OF CARE
Problem: Patient Care Overview  Goal: Plan of Care Review  Outcome: Ongoing (interventions implemented as appropriate)  POC reviewed with pt and family at 1400. Pt verbalized understanding. Questions and concerns addressed. No acute events today. Pt has been complaining of headache, pain medications was given, patient also has episodes of Hypertension, unequal reactive pupils, Team was notified, CT scan of head was done but no significant change was noted. Patient is still hyponatremia. Will continue to monitor. See flowsheets for full assessment and VS info.

## 2018-04-03 NOTE — PROGRESS NOTES
[facility]  Neurosurgery  Progress Note    Subjective:     History of Present Illness: Patient is a 53 year old female with a history of HTN and seizures, noncompliant on Dilantin who presents today with SDH. Reportedly not on anticoagulation. Patient hyponatremic to 110 on arrival.     Post-Op Info:  * No surgery found *         Interval History: NAEON. Continued headache    Medications:  Continuous Infusions:   sodium chloride 0.9% 75 mL/hr at 04/03/18 1301     Scheduled Meds:   fentaNYL        amLODIPine  10 mg Oral Daily    desmopressin  2 mcg Intravenous Q6H    fentaNYL  12.5 mcg Intravenous Once    folic acid  1 mg Oral Daily    gabapentin  300 mg Oral TID    heparin (porcine)  5,000 Units Subcutaneous Q8H    levothyroxine  75 mcg Oral Before breakfast    lisinopril  10 mg Oral Daily    nicotine  1 patch Transdermal Daily    phenytoin  200 mg Oral BID    And    phenytoin  100 mg Oral Daily    sodium chloride 0.9%  3 mL Intravenous Q8H    thiamine  100 mg Oral Daily     PRN Meds:hydrALAZINE, labetalol, oxyCODONE-acetaminophen     Review of Systems  Objective:     Weight: 64.7 kg (142 lb 10.2 oz)  Body mass index is 24.48 kg/m².  Vital Signs (Most Recent):  Temp: 98.9 °F (37.2 °C) (04/03/18 1101)  Pulse: 70 (04/03/18 1301)  Resp: 17 (04/03/18 1301)  BP: (!) 173/85 (04/03/18 1338)  SpO2: 99 % (04/03/18 1301) Vital Signs (24h Range):  Temp:  [98.5 °F (36.9 °C)-98.9 °F (37.2 °C)] 98.9 °F (37.2 °C)  Pulse:  [67-96] 70  Resp:  [12-42] 17  SpO2:  [99 %-100 %] 99 %  BP: (122-173)/(73-99) 173/85  Arterial Line BP: (124-173)/(57-96) 158/91       Date 04/03/18 0700 - 04/04/18 0659   Shift 7291-4543 9845-2971 0993-3469 24 Hour Total   I  N  T  A  K  E   P.O. 200   200    I.V.  (mL/kg) 407.1  (6.3)   407.1  (6.3)    Shift Total  (mL/kg) 607.1  (9.4)   607.1  (9.4)   O  U  T  P  U  T   Urine  (mL/kg/hr) 750   750    Shift Total  (mL/kg) 750  (11.6)   750  (11.6)   Weight (kg) 64.7 64.7 64.7  64.7     Neurosurgery Physical Exam      AAOx3, PERRL, EOMI, FS, TM, 5/5 throughout, SILT.    Significant Labs:    Recent Labs  Lab 04/02/18  0206  04/03/18  0225 04/03/18  0551 04/03/18  1248   GLU 98  --  96  --   --    *  127*  < > 123* 121* 117*   K 3.3*  --  4.5  --   --    CL 95  --  95  --   --    CO2 24  --  25  --   --    BUN 3*  --  5*  --   --    CREATININE 0.5  --  0.4*  --   --    CALCIUM 8.3*  --  8.1*  --   --    MG 2.1  --  1.7  --   --    < > = values in this interval not displayed.    Recent Labs  Lab 04/02/18  0206 04/03/18  0108   WBC 8.47 8.75   HGB 10.4* 9.3*   HCT 29.2* 26.8*   * 203     No results for input(s): LABPT, INR, APTT in the last 48 hours.  Microbiology Results (last 7 days)     Procedure Component Value Units Date/Time    Blood culture [[id number]] Collected:  03/31/18 2250    Order Status:  Completed Specimen:  Blood from Peripheral, Antecubital, Right Updated:  04/03/18 0612     Blood Culture, Routine No Growth to date     Blood Culture, Routine No Growth to date     Blood Culture, Routine No Growth to date    Narrative:       Blood cultures x 2 different sites. 4 bottles total. Please  draw cultures before administering antibiotics.    Blood culture [[id number]] Collected:  03/31/18 2112    Order Status:  Completed Specimen:  Blood from Peripheral, Antecubital, Left Updated:  04/03/18 0612     Blood Culture, Routine No Growth to date     Blood Culture, Routine No Growth to date     Blood Culture, Routine No Growth to date    Narrative:       Blood cultures from 2 different sites. 4 bottles total.  Please draw before starting antibiotics.    Blood Culture #2 **CANNOT BE ORDERED STAT** [[id number]] Collected:  03/31/18 1533    Order Status:  Completed Specimen:  Blood from Peripheral, Forearm, Right Updated:  04/02/18 1812     Blood Culture, Routine No Growth to date     Blood Culture, Routine No Growth to date     Blood Culture, Routine No Growth to date    Blood  culture [[id number]] Collected:  03/31/18 1533    Order Status:  Completed Specimen:  Blood from Line, Arterial, Right Updated:  04/02/18 1812     Blood Culture, Routine No Growth to date     Blood Culture, Routine No Growth to date     Blood Culture, Routine No Growth to date    Culture, Respiratory with Gram Stain [[id number]]     Order Status:  No result Specimen:  Respiratory from Endotracheal Aspirate         All pertinent labs from the last 24 hours have been reviewed.    Significant Diagnostics:  CT: Ct Head Without Contrast    Result Date: 4/3/2018  Stable subdural hemorrhage overlying the right cerebral hemisphere with mild mass effect and leftward midline shift.     Assessment/Plan:     * Acute subdural hematoma    53F PMH presenting with R acute SDH. Neurointact.    -- Will monitor closely for need to evac SDH.  -- CT head this AM with stable SDH.   -- Correct hyponatremia per NCC  -- SBP <160.  -- HOB >30.  -- Pain control.   -- OK to TTF neurosurgery service.  -- Neurochecks q4h, call NSGY immediately with decline in exam.  -- Medical management per NCC.             Alen Jain MD  Neurosurgery  [facility]

## 2018-04-03 NOTE — SUBJECTIVE & OBJECTIVE
Interval History: NAEON. Continued headache    Medications:  Continuous Infusions:   sodium chloride 0.9% 75 mL/hr at 04/03/18 1301     Scheduled Meds:   fentaNYL        amLODIPine  10 mg Oral Daily    desmopressin  2 mcg Intravenous Q6H    fentaNYL  12.5 mcg Intravenous Once    folic acid  1 mg Oral Daily    gabapentin  300 mg Oral TID    heparin (porcine)  5,000 Units Subcutaneous Q8H    levothyroxine  75 mcg Oral Before breakfast    lisinopril  10 mg Oral Daily    nicotine  1 patch Transdermal Daily    phenytoin  200 mg Oral BID    And    phenytoin  100 mg Oral Daily    sodium chloride 0.9%  3 mL Intravenous Q8H    thiamine  100 mg Oral Daily     PRN Meds:hydrALAZINE, labetalol, oxyCODONE-acetaminophen     Review of Systems  Objective:     Weight: 64.7 kg (142 lb 10.2 oz)  Body mass index is 24.48 kg/m².  Vital Signs (Most Recent):  Temp: 98.9 °F (37.2 °C) (04/03/18 1101)  Pulse: 70 (04/03/18 1301)  Resp: 17 (04/03/18 1301)  BP: (!) 173/85 (04/03/18 1338)  SpO2: 99 % (04/03/18 1301) Vital Signs (24h Range):  Temp:  [98.5 °F (36.9 °C)-98.9 °F (37.2 °C)] 98.9 °F (37.2 °C)  Pulse:  [67-96] 70  Resp:  [12-42] 17  SpO2:  [99 %-100 %] 99 %  BP: (122-173)/(73-99) 173/85  Arterial Line BP: (124-173)/(57-96) 158/91       Date 04/03/18 0700 - 04/04/18 0659   Shift 2759-9042 6991-2185 7270-8950 24 Hour Total   I  N  T  A  K  E   P.O. 200   200    I.V.  (mL/kg) 407.1  (6.3)   407.1  (6.3)    Shift Total  (mL/kg) 607.1  (9.4)   607.1  (9.4)   O  U  T  P  U  T   Urine  (mL/kg/hr) 750   750    Shift Total  (mL/kg) 750  (11.6)   750  (11.6)   Weight (kg) 64.7 64.7 64.7 64.7     Neurosurgery Physical Exam      AAOx3, PERRL, EOMI, FS, TM, 5/5 throughout, SILT.    Significant Labs:    Recent Labs  Lab 04/02/18  0206  04/03/18  0225 04/03/18  0551 04/03/18  1248   GLU 98  --  96  --   --    *  127*  < > 123* 121* 117*   K 3.3*  --  4.5  --   --    CL 95  --  95  --   --    CO2 24  --  25  --   --    BUN 3*  --   5*  --   --    CREATININE 0.5  --  0.4*  --   --    CALCIUM 8.3*  --  8.1*  --   --    MG 2.1  --  1.7  --   --    < > = values in this interval not displayed.    Recent Labs  Lab 04/02/18  0206 04/03/18  0108   WBC 8.47 8.75   HGB 10.4* 9.3*   HCT 29.2* 26.8*   * 203     No results for input(s): LABPT, INR, APTT in the last 48 hours.  Microbiology Results (last 7 days)     Procedure Component Value Units Date/Time    Blood culture [[id number]] Collected:  03/31/18 2250    Order Status:  Completed Specimen:  Blood from Peripheral, Antecubital, Right Updated:  04/03/18 0612     Blood Culture, Routine No Growth to date     Blood Culture, Routine No Growth to date     Blood Culture, Routine No Growth to date    Narrative:       Blood cultures x 2 different sites. 4 bottles total. Please  draw cultures before administering antibiotics.    Blood culture [[id number]] Collected:  03/31/18 2112    Order Status:  Completed Specimen:  Blood from Peripheral, Antecubital, Left Updated:  04/03/18 0612     Blood Culture, Routine No Growth to date     Blood Culture, Routine No Growth to date     Blood Culture, Routine No Growth to date    Narrative:       Blood cultures from 2 different sites. 4 bottles total.  Please draw before starting antibiotics.    Blood Culture #2 **CANNOT BE ORDERED STAT** [[id number]] Collected:  03/31/18 1533    Order Status:  Completed Specimen:  Blood from Peripheral, Forearm, Right Updated:  04/02/18 1812     Blood Culture, Routine No Growth to date     Blood Culture, Routine No Growth to date     Blood Culture, Routine No Growth to date    Blood culture [[id number]] Collected:  03/31/18 1533    Order Status:  Completed Specimen:  Blood from Line, Arterial, Right Updated:  04/02/18 1812     Blood Culture, Routine No Growth to date     Blood Culture, Routine No Growth to date     Blood Culture, Routine No Growth to date    Culture, Respiratory with Gram Stain [[id number]]     Order Status:  No result  Specimen:  Respiratory from Endotracheal Aspirate         All pertinent labs from the last 24 hours have been reviewed.    Significant Diagnostics:  CT: Ct Head Without Contrast    Result Date: 4/3/2018  Stable subdural hemorrhage overlying the right cerebral hemisphere with mild mass effect and leftward midline shift.

## 2018-04-03 NOTE — ASSESSMENT & PLAN NOTE
-Patient has reported history of prior seizure disorder(-partial complex)  -Convert IV keppra to home dose of PO dilantin, follow up AM level  -Seizure precautions.  - Currently no SZ

## 2018-04-03 NOTE — PLAN OF CARE
ICU Attending Note  Neurocritical Care    Na dropped to 121 with D5.    E4V5M6    -CT head per Neurosurgery  -pain control  -gabapentin, Percocet  -phenytoin  -nicotine patch  --160  -restart amlodipine  -desmopressin, change from D5 to NS 75 mL/h, 1 L total fluid restriction, tomorrow lift restrictions  -prophylaxis

## 2018-04-04 LAB
ALBUMIN SERPL BCP-MCNC: 2.7 G/DL
ALP SERPL-CCNC: 60 U/L
ALT SERPL W/O P-5'-P-CCNC: 10 U/L
ANION GAP SERPL CALC-SCNC: 8 MMOL/L
AST SERPL-CCNC: 12 U/L
BASOPHILS # BLD AUTO: 0.02 K/UL
BASOPHILS NFR BLD: 0.3 %
BILIRUB SERPL-MCNC: 0.4 MG/DL
BUN SERPL-MCNC: 4 MG/DL
CALCIUM SERPL-MCNC: 7.9 MG/DL
CHLORIDE SERPL-SCNC: 86 MMOL/L
CO2 SERPL-SCNC: 18 MMOL/L
CREAT SERPL-MCNC: 0.4 MG/DL
DIFFERENTIAL METHOD: ABNORMAL
EOSINOPHIL # BLD AUTO: 0.1 K/UL
EOSINOPHIL NFR BLD: 1 %
ERYTHROCYTE [DISTWIDTH] IN BLOOD BY AUTOMATED COUNT: 11.5 %
EST. GFR  (AFRICAN AMERICAN): >60 ML/MIN/1.73 M^2
EST. GFR  (NON AFRICAN AMERICAN): >60 ML/MIN/1.73 M^2
GABAPENTIN SERPLBLD-MCNC: <0.5 MCG/ML (ref 2–20)
GLUCOSE SERPL-MCNC: 95 MG/DL
HCT VFR BLD AUTO: 26.1 %
HGB BLD-MCNC: 9.2 G/DL
IMM GRANULOCYTES # BLD AUTO: 0.05 K/UL
IMM GRANULOCYTES NFR BLD AUTO: 0.7 %
LYMPHOCYTES # BLD AUTO: 1.5 K/UL
LYMPHOCYTES NFR BLD: 21.8 %
MAGNESIUM SERPL-MCNC: 1.4 MG/DL
MCH RBC QN AUTO: 29.8 PG
MCHC RBC AUTO-ENTMCNC: 35.2 G/DL
MCV RBC AUTO: 85 FL
MONOCYTES # BLD AUTO: 0.6 K/UL
MONOCYTES NFR BLD: 8.5 %
NEUTROPHILS # BLD AUTO: 4.7 K/UL
NEUTROPHILS NFR BLD: 67.7 %
NRBC BLD-RTO: 0 /100 WBC
OSMOLALITY UR: 625 MOSM/KG
PHOSPHATE SERPL-MCNC: 2.6 MG/DL
PLATELET # BLD AUTO: 130 K/UL
PMV BLD AUTO: 9.7 FL
POCT GLUCOSE: 100 MG/DL (ref 70–110)
POCT GLUCOSE: 117 MG/DL (ref 70–110)
POCT GLUCOSE: 92 MG/DL (ref 70–110)
POCT GLUCOSE: 92 MG/DL (ref 70–110)
POTASSIUM SERPL-SCNC: 3.8 MMOL/L
POTASSIUM SERPL-SCNC: 4.6 MMOL/L
POTASSIUM UR-SCNC: <10 MMOL/L
PROT SERPL-MCNC: 4.9 G/DL
RBC # BLD AUTO: 3.09 M/UL
SODIUM SERPL-SCNC: 112 MMOL/L
SODIUM SERPL-SCNC: 113 MMOL/L
SODIUM SERPL-SCNC: 114 MMOL/L
SODIUM SERPL-SCNC: 119 MMOL/L
SODIUM SERPL-SCNC: 123 MMOL/L
SODIUM SERPL-SCNC: 124 MMOL/L
SODIUM SERPL-SCNC: 124 MMOL/L
SODIUM UR-SCNC: <20 MMOL/L
WBC # BLD AUTO: 6.96 K/UL

## 2018-04-04 PROCEDURE — 25000003 PHARM REV CODE 250: Performed by: STUDENT IN AN ORGANIZED HEALTH CARE EDUCATION/TRAINING PROGRAM

## 2018-04-04 PROCEDURE — 97530 THERAPEUTIC ACTIVITIES: CPT

## 2018-04-04 PROCEDURE — 25000003 PHARM REV CODE 250: Performed by: ANESTHESIOLOGY

## 2018-04-04 PROCEDURE — 84132 ASSAY OF SERUM POTASSIUM: CPT

## 2018-04-04 PROCEDURE — 25000003 PHARM REV CODE 250: Performed by: PSYCHIATRY & NEUROLOGY

## 2018-04-04 PROCEDURE — 87205 SMEAR GRAM STAIN: CPT

## 2018-04-04 PROCEDURE — 25000003 PHARM REV CODE 250: Performed by: NURSE PRACTITIONER

## 2018-04-04 PROCEDURE — 20000000 HC ICU ROOM

## 2018-04-04 PROCEDURE — 84133 ASSAY OF URINE POTASSIUM: CPT

## 2018-04-04 PROCEDURE — 84295 ASSAY OF SERUM SODIUM: CPT

## 2018-04-04 PROCEDURE — S4991 NICOTINE PATCH NONLEGEND: HCPCS | Performed by: ANESTHESIOLOGY

## 2018-04-04 PROCEDURE — A4216 STERILE WATER/SALINE, 10 ML: HCPCS | Performed by: STUDENT IN AN ORGANIZED HEALTH CARE EDUCATION/TRAINING PROGRAM

## 2018-04-04 PROCEDURE — 99233 SBSQ HOSP IP/OBS HIGH 50: CPT | Mod: ,,, | Performed by: NURSE PRACTITIONER

## 2018-04-04 PROCEDURE — 83735 ASSAY OF MAGNESIUM: CPT

## 2018-04-04 PROCEDURE — 80053 COMPREHEN METABOLIC PANEL: CPT

## 2018-04-04 PROCEDURE — 84295 ASSAY OF SERUM SODIUM: CPT | Mod: 91

## 2018-04-04 PROCEDURE — 84100 ASSAY OF PHOSPHORUS: CPT

## 2018-04-04 PROCEDURE — 84300 ASSAY OF URINE SODIUM: CPT

## 2018-04-04 PROCEDURE — 85025 COMPLETE CBC W/AUTO DIFF WBC: CPT

## 2018-04-04 PROCEDURE — 87070 CULTURE OTHR SPECIMN AEROBIC: CPT

## 2018-04-04 PROCEDURE — 99232 SBSQ HOSP IP/OBS MODERATE 35: CPT | Mod: ,,, | Performed by: NEUROLOGICAL SURGERY

## 2018-04-04 PROCEDURE — 25000003 PHARM REV CODE 250: Performed by: PHYSICIAN ASSISTANT

## 2018-04-04 PROCEDURE — 63600175 PHARM REV CODE 636 W HCPCS: Performed by: PHYSICIAN ASSISTANT

## 2018-04-04 PROCEDURE — 63600175 PHARM REV CODE 636 W HCPCS: Mod: JG | Performed by: NURSE PRACTITIONER

## 2018-04-04 RX ORDER — SODIUM,POTASSIUM PHOSPHATES 280-250MG
2 POWDER IN PACKET (EA) ORAL
Status: DISCONTINUED | OUTPATIENT
Start: 2018-04-04 | End: 2018-04-07 | Stop reason: HOSPADM

## 2018-04-04 RX ORDER — POTASSIUM CHLORIDE 20 MEQ/15ML
40 SOLUTION ORAL
Status: DISCONTINUED | OUTPATIENT
Start: 2018-04-04 | End: 2018-04-07 | Stop reason: HOSPADM

## 2018-04-04 RX ORDER — POTASSIUM CHLORIDE 20 MEQ/15ML
60 SOLUTION ORAL
Status: DISCONTINUED | OUTPATIENT
Start: 2018-04-04 | End: 2018-04-07 | Stop reason: HOSPADM

## 2018-04-04 RX ORDER — LANOLIN ALCOHOL/MO/W.PET/CERES
800 CREAM (GRAM) TOPICAL
Status: DISCONTINUED | OUTPATIENT
Start: 2018-04-04 | End: 2018-04-07 | Stop reason: HOSPADM

## 2018-04-04 RX ORDER — OXYCODONE AND ACETAMINOPHEN 10; 325 MG/1; MG/1
1 TABLET ORAL EVERY 4 HOURS PRN
Status: DISCONTINUED | OUTPATIENT
Start: 2018-04-04 | End: 2018-04-06

## 2018-04-04 RX ORDER — DESMOPRESSIN ACETATE 4 UG/ML
1 INJECTION, SOLUTION INTRAVENOUS; SUBCUTANEOUS EVERY 6 HOURS
Status: DISCONTINUED | OUTPATIENT
Start: 2018-04-05 | End: 2018-04-05

## 2018-04-04 RX ORDER — DESMOPRESSIN ACETATE 4 UG/ML
2 INJECTION, SOLUTION INTRAVENOUS; SUBCUTANEOUS ONCE
Status: COMPLETED | OUTPATIENT
Start: 2018-04-04 | End: 2018-04-04

## 2018-04-04 RX ADMIN — PHENYTOIN SODIUM 100 MG: 100 CAPSULE ORAL at 11:04

## 2018-04-04 RX ADMIN — POTASSIUM CHLORIDE 40 MEQ: 20 SOLUTION ORAL at 02:04

## 2018-04-04 RX ADMIN — DESMOPRESSIN ACETATE 1 MCG: 4 SOLUTION INTRAVENOUS at 11:04

## 2018-04-04 RX ADMIN — Medication 3 ML: at 05:04

## 2018-04-04 RX ADMIN — SODIUM CHLORIDE: 0.9 INJECTION, SOLUTION INTRAVENOUS at 09:04

## 2018-04-04 RX ADMIN — OXYCODONE AND ACETAMINOPHEN 1 TABLET: 10; 325 TABLET ORAL at 09:04

## 2018-04-04 RX ADMIN — HEPARIN SODIUM 5000 UNITS: 5000 INJECTION, SOLUTION INTRAVENOUS; SUBCUTANEOUS at 09:04

## 2018-04-04 RX ADMIN — SODIUM CHLORIDE: 0.9 INJECTION, SOLUTION INTRAVENOUS at 02:04

## 2018-04-04 RX ADMIN — NICOTINE 1 PATCH: 21 PATCH, EXTENDED RELEASE TRANSDERMAL at 08:04

## 2018-04-04 RX ADMIN — PHENYTOIN SODIUM 200 MG: 100 CAPSULE ORAL at 08:04

## 2018-04-04 RX ADMIN — DESMOPRESSIN ACETATE 2 MCG: 4 SOLUTION INTRAVENOUS at 06:04

## 2018-04-04 RX ADMIN — GABAPENTIN 300 MG: 300 CAPSULE ORAL at 02:04

## 2018-04-04 RX ADMIN — POTASSIUM & SODIUM PHOSPHATES POWDER PACK 280-160-250 MG 2 PACKET: 280-160-250 PACK at 02:04

## 2018-04-04 RX ADMIN — OXYCODONE AND ACETAMINOPHEN 1 TABLET: 10; 325 TABLET ORAL at 02:04

## 2018-04-04 RX ADMIN — LEVOTHYROXINE SODIUM 75 MCG: 75 TABLET ORAL at 05:04

## 2018-04-04 RX ADMIN — Medication 3 ML: at 02:04

## 2018-04-04 RX ADMIN — POTASSIUM & SODIUM PHOSPHATES POWDER PACK 280-160-250 MG 2 PACKET: 280-160-250 PACK at 05:04

## 2018-04-04 RX ADMIN — OXYCODONE AND ACETAMINOPHEN 1 TABLET: 10; 325 TABLET ORAL at 05:04

## 2018-04-04 RX ADMIN — GABAPENTIN 300 MG: 300 CAPSULE ORAL at 09:04

## 2018-04-04 RX ADMIN — Medication 3 ML: at 09:04

## 2018-04-04 RX ADMIN — LISINOPRIL 10 MG: 10 TABLET ORAL at 08:04

## 2018-04-04 RX ADMIN — MAGNESIUM OXIDE TAB 400 MG (241.3 MG ELEMENTAL MG) 800 MG: 400 (241.3 MG) TAB at 05:04

## 2018-04-04 RX ADMIN — SODIUM CHLORIDE: 0.9 INJECTION, SOLUTION INTRAVENOUS at 05:04

## 2018-04-04 RX ADMIN — FOLIC ACID 1 MG: 1 TABLET ORAL at 08:04

## 2018-04-04 RX ADMIN — HEPARIN SODIUM 5000 UNITS: 5000 INJECTION, SOLUTION INTRAVENOUS; SUBCUTANEOUS at 05:04

## 2018-04-04 RX ADMIN — OXYCODONE AND ACETAMINOPHEN 1 TABLET: 10; 325 TABLET ORAL at 08:04

## 2018-04-04 RX ADMIN — PHENYTOIN SODIUM 200 MG: 100 CAPSULE ORAL at 09:04

## 2018-04-04 RX ADMIN — AMLODIPINE BESYLATE 10 MG: 10 TABLET ORAL at 08:04

## 2018-04-04 RX ADMIN — MAGNESIUM OXIDE TAB 400 MG (241.3 MG ELEMENTAL MG) 800 MG: 400 (241.3 MG) TAB at 02:04

## 2018-04-04 RX ADMIN — GABAPENTIN 300 MG: 300 CAPSULE ORAL at 08:04

## 2018-04-04 RX ADMIN — Medication 100 MG: at 08:04

## 2018-04-04 RX ADMIN — HEPARIN SODIUM 5000 UNITS: 5000 INJECTION, SOLUTION INTRAVENOUS; SUBCUTANEOUS at 02:04

## 2018-04-04 RX ADMIN — ONDANSETRON 8 MG: 8 TABLET, ORALLY DISINTEGRATING ORAL at 02:04

## 2018-04-04 NOTE — PT/OT/SLP PROGRESS
Physical Therapy Treatment/ Discharge    Patient Name:  Caridad Ortiz   MRN:  [id number]    Recommendations:     Discharge Recommendations:  home with home health   Discharge Equipment Recommendations: none   Barriers to discharge: None    Assessment:     Caridad Ortiz is a 53 y.o. female admitted with a medical diagnosis of Acute subdural hematoma.  She presents with the following impairments/functional limitations:  gait instability, impaired endurance, impaired balance, decreased safety awareness, impaired functional mobilty, pain. Pt performed bed mobility and transfers mod (I) and amb ~300ft S without AD; no LOB or SOB. Pt amb limited 2* HA. Pt safe to d/c home from a mobility standpoint without further needs for skilled PT at this time.     Recent Surgery: * No surgery found *      Plan:      D/C PT    Subjective     Communicated with RN prior to session.  Patient found supine in bed upon PT entry to room, agreeable to treatment.      Chief Complaint: HA  Patient comments/goals: return home  Pain/Comfort:  · Pain Rating 1: other (see comments) (pt did not rate pain)  · Location - Side 1: Bilateral  · Location - Orientation 1: generalized  · Location 1: head  · Pain Addressed 1: Reposition, Distraction  · Pain Rating Post-Intervention 1: other (see comments) (pt did not rate pain)      Objective:     Patient found with: arterial line, blood pressure cuff, telemetry, pulse ox (continuous), chase catheter     General Precautions: Standard, aspiration, fall, seizure   Orthopedic Precautions:N/A   Braces: N/A     Functional Mobility:  Bed Mobility:     · Supine to Sit: modified independence  · Sit to Supine: modified independence    Transfers:     · Sit to Stand:  modified independence with no AD    Gait: ~300ft S without AD; no LOB or SOB. Pt amb limited 2* HA.    AM-PAC 6 CLICK MOBILITY  Turning over in bed (including adjusting bedclothes, sheets and blankets)?: 4  Sitting down on and standing up from a chair  with arms (e.g., wheelchair, bedside commode, etc.): 3  Moving from lying on back to sitting on the side of the bed?: 4  Moving to and from a bed to a chair (including a wheelchair)?: 3  Need to walk in hospital room?: 3  Climbing 3-5 steps with a railing?: 3  Total Score: 20       Therapeutic Activities and Exercises:  Pt educated on safety with transfers and amb.  Pt reports no further questions or concerns from a mobility standpoint.  Pt safe to amb in hallway with RN staff.     Patient left supine with all lines intact, call button in reach and RN notified..    GOALS:    Physical Therapy Goals     Not on file          Multidisciplinary Problems (Resolved)        Problem: Physical Therapy Goal    Goal Priority Disciplines Outcome Goal Variances Interventions   Physical Therapy Goal   (Resolved)     PT/OT, PT Outcome(s) achieved     Description:  Goals to be met by: 2018     Patient will increase functional independence with mobility by performin. Sit to stand transfer with Modified Ida-met  2. Gait  x 200 feet with Supervision without AD.-met  3. Lower extremity exercise program x15 reps per handout, with supervision                       Time Tracking:     PT Received On: 18  PT Start Time: 1027     PT Stop Time: 1039  PT Total Time (min): 12 min     Billable Minutes: Therapeutic Activity 12    Treatment Type: Treatment  PT/PTA: PT     PTA Visit Number: 0     ROSALINDA PINTO, PT  2018

## 2018-04-04 NOTE — PLAN OF CARE
SW advised by RN that Pt requested SW to come.     SW met with Pt regarding her desire to apply for disability. SW to refer her to [employer] and reported they will come to help her apply.    Court Raphael, FRANK  Neurocritical Care   [facility]  [id number]

## 2018-04-04 NOTE — PROGRESS NOTES
[facility]  Neurocritical Care  Progress Note    Admit Date: 3/31/2018  Service Date: 04/04/2018  Length of Stay: 4    Subjective:     Chief Complaint: Acute subdural hematoma    History of Present Illness: Caridad Ortiz is a 53 y.o. female with PHMx EtOH abuse, depression, hypothyroidism who presents as transfer from [facility] in [facility] for witnessed seizure. She was provided with versed and transported here when workup noted an ICH. In addition she may have a history of seizures based on documentation from OSH and a negative dilantin level was drawn but no dilantin or other AEDs were found in home medications sent along with her. On arrival NSGY was consulted emergently and are currently evaluating the patient.    Of note the patients medications have a bottle of gabapentin and a bottle of tizanidine, both filled at the beginning of the month, that are empty.     HPI is based off of recollections from other providers including EMS and ED providers as well as the medical record due to intubation.     Hospital Course: --3/31--once safely in ICU, sedation stopped and patient successfully extubated to room air. Repeat imaging of head stable and neuro exam without decline  --sodium chronically low , 110 on admission. Over corrected since admission with NS. D5W gtt today and DDAVP . Stable neuro exam although patient complains of chronic pain. Restarting home gabapentin. Following Na q6 with goal to low 120s by end of the day. Most recent Na 127.   4/3:  continue Desmopressin x 1 more day, NA goal mid 120's, 1 liter fluid restriction, + amlodipine   4/4: stop desmopressin, change pain meds to q 4, na q 3 follow urine lytes     Interval History:   stop desmopressin, change pain meds to q 4, na q 3 follow urine lytes     Review of Systems      Review of symptoms  Constitutional: Denies fevers or chills.  Pulmonary: Denies shortness of breath or cough.  Cardiology: Denies  chest pain or palpitations.  GI: Denies abdominal pain or constipation.  Neurologic: Denies new weakness or paresthesias.    + headache   Objective:     Vitals:  Temp: 98.4 °F (36.9 °C)  Pulse: 89  Rhythm: normal sinus rhythm  BP: 127/84  MAP (mmHg): 101  Resp: (!) 50  SpO2: (!) 94 %  Oxygen Concentration (%): 3  O2 Device (Oxygen Therapy): room air    Temp  Min: 98.4 °F (36.9 °C)  Max: 99.2 °F (37.3 °C)  Pulse  Min: 67  Max: 89  BP  Min: 127/84  Max: 173/85  MAP (mmHg)  Min: 86  Max: 1009  Resp  Min: 9  Max: 50  SpO2  Min: 94 %  Max: 100 %  Oxygen Concentration (%)  Min: 3  Max: 3    04/03 0701 - 04/04 0700  In: 2876.3 [P.O.:600; I.V.:2276.3]  Out: 2350 [Urine:2350]   Unmeasured Output  Urine Occurrence: 1  Stool Occurrence: 0       Physical Exam         Physical Exam:  GA: Alert, comfortable, no acute distress.   HEENT: No scleral icterus or JVD.   Pulmonary: Clear to auscultation A/P/L. No wheezing, crackles, or rhonchi.  Cardiac: RRR S1 & S2 w/o rubs/murmurs/gallops.   Abdominal: Bowel sounds present x 4. No appreciable hepatosplenomegaly.  Skin: No jaundice, rashes, or visible lesions.  Neuro:  --GCS: E4 V5 M6  --Mental Status:  Awake alert oriented follows all commands   --CN II-XII grossly intact.   --Pupils 3mm, PERRL.   --Corneal reflex, gag, cough intact.  --LUE strength: 5/5  --RUE strength: 5/5  --LLE strength: 5/5  --RLE strength: 5/5    Unable to test gait due to level of consciousness.    Medications:  Continuous    sodium chloride 0.9% Last Rate: 125 mL/hr at 04/04/18 1201   Scheduled    amLODIPine 10 mg Daily   folic acid 1 mg Daily   gabapentin 300 mg TID   heparin (porcine) 5,000 Units Q8H   levothyroxine 75 mcg Before breakfast   lisinopril 10 mg Daily   nicotine 1 patch Daily   phenytoin 200 mg BID   And     phenytoin 100 mg Daily   sodium chloride 0.9% 3 mL Q8H   thiamine 100 mg Daily   PRN    hydrALAZINE 10 mg Q4H PRN   labetalol 10 mg Q4H PRN   magnesium oxide 800 mg PRN   magnesium oxide 800  mg PRN   ondansetron 8 mg Q8H PRN   oxyCODONE-acetaminophen 1 tablet Q4H PRN   potassium chloride 10% 40 mEq PRN   potassium chloride 10% 40 mEq PRN   potassium chloride 10% 60 mEq PRN   potassium, sodium phosphates 2 packet PRN   potassium, sodium phosphates 2 packet PRN   potassium, sodium phosphates 2 packet PRN     Today I personally reviewed pertinent medications, lines/drains/airways, imaging, lab results,     Diet  Diet Low Sodium, 2gm  Diet Low Sodium, 2gm        Assessment/Plan:     Neuro   * Acute subdural hematoma    -First noted on OSH CT AM of 3/31  -Repeat imaging grossly stable.  -NSGY consulted, recommendations appreciated.  -Extubated, stable neurologicially  -Sodium over corrected, monitor in ICU to monitor Na q6 while adjusting levels  -Monitor for signs of ODS  -Fluid restrict   - CTH - Stable         Brain compression    -F/u q3h sodium checks  -q1h neuro exams  - repeat CTH stable   - NA goals 120        Seizure after head injury    -Patient has reported history of prior seizure disorder(-partial complex)  -Convert IV keppra to home dose of PO dilantin, follow up AM level  -Seizure precautions.  - Currently no SZ        Renal/   Hyponatremia      -Heavy beer drinker, likely secondary to potomania  -NSat 75cc/hr, DDAVP and fluid restrict as above to avoid risk of osmotic demylelination syndrome with overcorrection on admission   -Follow sodiums q 3  - Goal 120  - Desmopressin stopped             Prophylaxis:  Venous Thromboembolism: mechanical chemical  Stress Ulcer: None  Ventilator Pneumonia: not applicable     Activity Orders          Up with assistance starting at 03/31 1345    Out of bed to chair up to 3x daily starting at 03/31 1300        Full Code    Donte Abdi NP  Neurocritical Care  [facility]

## 2018-04-04 NOTE — ASSESSMENT & PLAN NOTE
53F PMH presenting with R acute SDH.     Neurointact.   CT head stable  -- Correct hyponatremia per NCC  -- SBP <160.  -- HOB >30.  -- Pain control.   -- Neurochecks q4h,   call NSGY immediately with decline in exam.  -- Medical management per NCC.   Consider Endocrine consult for hyponatremia    Dispo- cont ICU care today. possibel TTF tomrrow.

## 2018-04-04 NOTE — PLAN OF CARE
Problem: Patient Care Overview  Goal: Plan of Care Review  Outcome: Ongoing (interventions implemented as appropriate)  Patient AAOx4. Plan of care and all safety precautions maintained and discussed. Neuro status unchanged. PEERL +3. Vital signs stable. PRN pain medication administered for complaint of constant headache. Vital signs stable throughout shift. Patient transported to scheduled CT scan during the shift. Kennedy catheter to gravity. Critical lab value of Q3 hr sodium of 112. No acute events throughout shift. Will continue to monitor.

## 2018-04-04 NOTE — PLAN OF CARE
ICU Attending Note  Neurocritical Care    Recurrent hyponatremia. Stopped desmopressin. Repeat Mireille, UK quite high. CT head persistent R SDH with mild brain compression.    Hypomagnesemia. Hypophosphatemia. Normocytic anemia.    E4V5M6    -pain control  --160  -q3h Na, hold desmopressin unless UOP >300 mL x3 h  -free water restriction 500 mL/d  -repeat Mireille, UK, UOsm now  - mL/h  -goal is Na 120 today without hypertonic saline

## 2018-04-04 NOTE — ASSESSMENT & PLAN NOTE
-Heavy beer drinker, likely secondary to potomania  -NSat 75cc/hr, DDAVP and fluid restrict as above to avoid risk of osmotic demylelination syndrome with overcorrection on admission   -Follow sodiums q 3  - Goal 120  - Desmopressin stopped

## 2018-04-04 NOTE — NURSING
Urine Sodium <20 and Urine Potassium <10, patient urine output is 350 per hour for 3 hours, Donte SAHNI was notified and stated to decrease IV NS fluid to 75/hr and will put order for desmopresin.

## 2018-04-04 NOTE — PT/OT/SLP PROGRESS
Occupational Therapy   Treatment & Discharge    Name: Caridad Ortiz  MRN: [id number]  Admitting Diagnosis:  Acute subdural hematoma       Recommendations:     Discharge Recommendations: home with home health  Discharge Equipment Recommendations:  none  Barriers to discharge:  None    Subjective     Communicated with: RN prior to session.  Pain/Comfort:  · Pain Rating 1:  (did not rate on numerical scale- reported HA- stated better than yesterday)    Patients cultural, spiritual, Syn[employer] conflicts given the current situation: none reported    Objective:     Patient found with: arterial line, telemetry, blood pressure cuff, peripheral IV, chase catheter    General Precautions: Standard, aspiration, fall, seizure (low sodium diet)   Orthopedic Precautions:N/A   Braces: N/A     Occupational Performance:    Bed Mobility:    · Patient completed Supine to Sit with independence and HOB flat  · Patient completed Sit to Supine with independence and HOB flat     Functional Mobility/Transfers:  · Patient completed Sit <> Stand Transfer with modified independence  with  no assistive device   · Patient completed Bed <> Chair Transfer using Stand Pivot technique with modified independence with no assistive device  · Functional Mobility: short household distance with mod(I)    Activities of Daily Living:  · UB Dressing: modified independence no fine motor deficits for snaps/ties  · LB Dressing: modified independence while seated EOB in 4 point position     Patient left HOB elevated with all lines intact, call button in reach, bed alarm on and RN notified    AMPA 6 Click:  AMPAC Total Score: 23     Therapeutic Exercise: chest press and shoulder press for endurance training   Modality Used: rolled towel (no weight 2/2 a-line)  # of Sets: 1  # of Reps: 15  Endurance Level: good  Where Completed: seated in bedside chair     Treatment & Education:  -Pt alert and oriented x4; agreeable to therapy session   -Completed standing high  knee marching x10 reps Bilaterally  -Communication board updated; no family present at bedside for education   Education:    Assessment:     Caridad Ortiz is a 53 y.o. female with a medical diagnosis of Acute subdural hematoma.  She presents with baseline level for functional mobility, B UE endurance, strength and fine motor coordination. She demo no skilled OT needs at this time.  Performance deficits affecting function are impaired endurance, impaired functional mobilty, impaired self care skills, decreased safety awareness, pain.      Rehab Prognosis:  Good; patient would benefit from acute skilled OT services to address these deficits and reach maximum level of function.       Plan:     Patient d/c from OT on this date. She is safe to be up with nursing staff for functional mobility and ADLs.   She demo no skilled OT needs at this time.     GOALS:    Occupational Therapy Goals     Not on file          Multidisciplinary Problems (Resolved)        Problem: Occupational Therapy Goal    Goal Priority Disciplines Outcome Interventions   Occupational Therapy Goal   (Resolved)     OT, PT/OT Outcome(s) achieved    Description:  Goals to be met by: 4/9   Patient will increase functional independence with ADLs by performing:    Grooming while standing at sink with Supervision. Not met  Toileting from toilet with Supervision for hygiene and clothing management. Not met  Toilet transfer to toilet with Supervision. Not met  Functional mobility at household distance for ADL task with supervision assistance. met  Pt will complete dynamic standing task to simulate home mgmt task with supervision for 8 min duration. met                    Time Tracking:     OT Date of Treatment: 04/04/18  OT Start Time: 0924  OT Stop Time: 0940  OT Total Time (min): 16 min    Billable Minutes:Therapeutic Activity 16    LUCY Lopez  4/4/2018

## 2018-04-04 NOTE — PLAN OF CARE
Problem: Physical Therapy Goal  Goal: Physical Therapy Goal  Goals to be met by: 2018     Patient will increase functional independence with mobility by performin. Sit to stand transfer with Modified St[place]-met  2. Gait  x 200 feet with Supervision without AD.-met  3. Lower extremity exercise program x15 reps per handout, with supervision     Outcome: Outcome(s) achieved Date Met: 18  Pt met all functional goals. Pt safe to d/c home from a mobility standpoint without further needs for skilled PT at this time.    ROSALINDA PINTO, PT  2018

## 2018-04-04 NOTE — SUBJECTIVE & OBJECTIVE
Interval History:   stop desmopressin, change pain meds to q 4, na q 3 follow urine lytes     Review of Systems      Review of symptoms  Constitutional: Denies fevers or chills.  Pulmonary: Denies shortness of breath or cough.  Cardiology: Denies chest pain or palpitations.  GI: Denies abdominal pain or constipation.  Neurologic: Denies new weakness or paresthesias.    + headache   Objective:     Vitals:  Temp: 98.4 °F (36.9 °C)  Pulse: 89  Rhythm: normal sinus rhythm  BP: 127/84  MAP (mmHg): 101  Resp: (!) 50  SpO2: (!) 94 %  Oxygen Concentration (%): 3  O2 Device (Oxygen Therapy): room air    Temp  Min: 98.4 °F (36.9 °C)  Max: 99.2 °F (37.3 °C)  Pulse  Min: 67  Max: 89  BP  Min: 127/84  Max: 173/85  MAP (mmHg)  Min: 86  Max: 1009  Resp  Min: 9  Max: 50  SpO2  Min: 94 %  Max: 100 %  Oxygen Concentration (%)  Min: 3  Max: 3    04/03 0701 - 04/04 0700  In: 2876.3 [P.O.:600; I.V.:2276.3]  Out: 2350 [Urine:2350]   Unmeasured Output  Urine Occurrence: 1  Stool Occurrence: 0       Physical Exam         Physical Exam:  GA: Alert, comfortable, no acute distress.   HEENT: No scleral icterus or JVD.   Pulmonary: Clear to auscultation A/P/L. No wheezing, crackles, or rhonchi.  Cardiac: RRR S1 & S2 w/o rubs/murmurs/gallops.   Abdominal: Bowel sounds present x 4. No appreciable hepatosplenomegaly.  Skin: No jaundice, rashes, or visible lesions.  Neuro:  --GCS: E4 V5 M6  --Mental Status:  Awake alert oriented follows all commands   --CN II-XII grossly intact.   --Pupils 3mm, PERRL.   --Corneal reflex, gag, cough intact.  --LUE strength: 5/5  --RUE strength: 5/5  --LLE strength: 5/5  --RLE strength: 5/5    Unable to test gait due to level of consciousness.    Medications:  Continuous    sodium chloride 0.9% Last Rate: 125 mL/hr at 04/04/18 1201   Scheduled    amLODIPine 10 mg Daily   folic acid 1 mg Daily   gabapentin 300 mg TID   heparin (porcine) 5,000 Units Q8H   levothyroxine 75 mcg Before breakfast   lisinopril 10 mg Daily    nicotine 1 patch Daily   phenytoin 200 mg BID   And     phenytoin 100 mg Daily   sodium chloride 0.9% 3 mL Q8H   thiamine 100 mg Daily   PRN    hydrALAZINE 10 mg Q4H PRN   labetalol 10 mg Q4H PRN   magnesium oxide 800 mg PRN   magnesium oxide 800 mg PRN   ondansetron 8 mg Q8H PRN   oxyCODONE-acetaminophen 1 tablet Q4H PRN   potassium chloride 10% 40 mEq PRN   potassium chloride 10% 40 mEq PRN   potassium chloride 10% 60 mEq PRN   potassium, sodium phosphates 2 packet PRN   potassium, sodium phosphates 2 packet PRN   potassium, sodium phosphates 2 packet PRN     Today I personally reviewed pertinent medications, lines/drains/airways, imaging, lab results,     Diet  Diet Low Sodium, 2gm  Diet Low Sodium, 2gm

## 2018-04-04 NOTE — SUBJECTIVE & OBJECTIVE
Interval History: NAEON. Continued headache    Medications:  Continuous Infusions:   sodium chloride 0.9% 125 mL/hr at 04/04/18 1501     Scheduled Meds:   amLODIPine  10 mg Oral Daily    folic acid  1 mg Oral Daily    gabapentin  300 mg Oral TID    heparin (porcine)  5,000 Units Subcutaneous Q8H    levothyroxine  75 mcg Oral Before breakfast    lisinopril  10 mg Oral Daily    nicotine  1 patch Transdermal Daily    phenytoin  200 mg Oral BID    And    phenytoin  100 mg Oral Daily    sodium chloride 0.9%  3 mL Intravenous Q8H    thiamine  100 mg Oral Daily     PRN Meds:hydrALAZINE, labetalol, magnesium oxide, magnesium oxide, ondansetron, oxyCODONE-acetaminophen, potassium chloride 10%, potassium chloride 10%, potassium chloride 10%, potassium, sodium phosphates, potassium, sodium phosphates, potassium, sodium phosphates     Review of Systems    Objective:     Weight: 64.7 kg (142 lb 10.2 oz)  Body mass index is 24.48 kg/m².  Vital Signs (Most Recent):  Temp: 98.9 °F (37.2 °C) (04/04/18 1501)  Pulse: 79 (04/04/18 1501)  Resp: 16 (04/04/18 1501)  BP: (!) 108/59 (04/04/18 1501)  SpO2: 100 % (04/04/18 1501) Vital Signs (24h Range):  Temp:  [98.4 °F (36.9 °C)-98.9 °F (37.2 °C)] 98.9 °F (37.2 °C)  Pulse:  [67-89] 79  Resp:  [9-50] 16  SpO2:  [94 %-100 %] 100 %  BP: (108-159)/() 108/59  Arterial Line BP: (110-161)/(57-90) 110/64       Date 04/04/18 0700 - 04/05/18 0659   Shift 4621-6129 7904-6079 5420-1193 24 Hour Total   I  N  T  A  K  E   P.O. 350   350    I.V.  (mL/kg) 1012.5  (15.6) 125  (1.9)  1137.5  (17.6)    Shift Total  (mL/kg) 1362.5  (21.1) 125  (1.9)  1487.5  (23)   O  U  T  P  U  T   Urine  (mL/kg/hr) 1375  (2.7) 350  1725    Shift Total  (mL/kg) 1375  (21.3) 350  (5.4)  1725  (26.7)   Weight (kg) 64.7 64.7 64.7 64.7     Physical Exam:  Nursing note and vitals reviewed.    Constitutional: She appears well-developed and well-nourished.     Abdominal: Soft.     Psych/Behavior: She is alert. She  is oriented to person, place, and time. She has a normal mood and affect.     Neurological:   PERRL, EOMI FS TM  GOODWIN 5/5  SILT  No drift         AAOx3, PERRL, EOMI, FS, TM, 5/5 throughout, SILT.    Significant Labs:    Recent Labs  Lab 04/03/18  0225  04/04/18  0342 04/04/18  0602 04/04/18  1117 04/04/18  1459   GLU 96  --  95  --   --   --    *  < > 112*  112* 114* 113* 119*   K 4.5  --  3.8  --   --   --    CL 95  --  86*  --   --   --    CO2 25  --  18*  --   --   --    BUN 5*  --  4*  --   --   --    CREATININE 0.4*  --  0.4*  --   --   --    CALCIUM 8.1*  --  7.9*  --   --   --    MG 1.7  --  1.4*  --   --   --    < > = values in this interval not displayed.    Recent Labs  Lab 04/03/18  0108 04/04/18  0342   WBC 8.75 6.96   HGB 9.3* 9.2*   HCT 26.8* 26.1*    130*     No results for input(s): LABPT, INR, APTT in the last 48 hours.  Microbiology Results (last 7 days)     Procedure Component Value Units Date/Time    Culture, Respiratory with Gram Stain [[id number]] Collected:  04/04/18 1217    Order Status:  Sent Specimen:  Respiratory from Sputum Updated:  04/04/18 1425    Blood culture [[id number]] Collected:  03/31/18 2250    Order Status:  Completed Specimen:  Blood from Peripheral, Antecubital, Right Updated:  04/04/18 0612     Blood Culture, Routine No Growth to date     Blood Culture, Routine No Growth to date     Blood Culture, Routine No Growth to date     Blood Culture, Routine No Growth to date    Narrative:       Blood cultures x 2 different sites. 4 bottles total. Please  draw cultures before administering antibiotics.    Blood culture [[id number]] Collected:  03/31/18 2112    Order Status:  Completed Specimen:  Blood from Peripheral, Antecubital, Left Updated:  04/04/18 0612     Blood Culture, Routine No Growth to date     Blood Culture, Routine No Growth to date     Blood Culture, Routine No Growth to date     Blood Culture, Routine No Growth to date    Narrative:       Blood cultures  from 2 different sites. 4 bottles total.  Please draw before starting antibiotics.    Blood culture [[id number]] Collected:  03/31/18 1533    Order Status:  Completed Specimen:  Blood from Line, Arterial, Right Updated:  04/03/18 1812     Blood Culture, Routine No Growth to date     Blood Culture, Routine No Growth to date     Blood Culture, Routine No Growth to date     Blood Culture, Routine No Growth to date    Blood Culture #2 **CANNOT BE ORDERED STAT** [[id number]] Collected:  03/31/18 1533    Order Status:  Completed Specimen:  Blood from Peripheral, Forearm, Right Updated:  04/03/18 1812     Blood Culture, Routine No Growth to date     Blood Culture, Routine No Growth to date     Blood Culture, Routine No Growth to date     Blood Culture, Routine No Growth to date        All pertinent labs from the last 24 hours have been reviewed.    Significant Diagnostics:  CT: Ct Head Without Contrast    Result Date: 4/3/2018  Stable subdural hemorrhage overlying the right cerebral hemisphere with mild mass effect and leftward midline shift.

## 2018-04-04 NOTE — PLAN OF CARE
Problem: Occupational Therapy Goal  Goal: Occupational Therapy Goal  Goals to be met by: 4/9   Patient will increase functional independence with ADLs by performing:    Grooming while standing at sink with Supervision. Not met  Toileting from toilet with Supervision for hygiene and clothing management. Not met  Toilet transfer to toilet with Supervision. Not met  Functional mobility at household distance for ADL task with supervision assistance. met  Pt will complete dynamic standing task to simulate home mgmt task with supervision for 8 min duration. met  Outcome: Outcome(s) achieved Date Met: 04/04/18  Pt safe to be up with nursing staff at this time. She demo no skilled OT needs. Rec  for d/c planning. No DME needs. LUCY Lopez 4/4/2018

## 2018-04-04 NOTE — PROGRESS NOTES
[facility]  Neurosurgery  Progress Note    Subjective:     History of Present Illness: Patient is a 53 year old female with a history of HTN and seizures, noncompliant on Dilantin who presents today with SDH. Reportedly not on anticoagulation. Patient hyponatremic to 110 on arrival.     Post-Op Info:  * No surgery found *         Interval History: NAEON. Continued headache    Medications:  Continuous Infusions:   sodium chloride 0.9% 125 mL/hr at 04/04/18 1501     Scheduled Meds:   amLODIPine  10 mg Oral Daily    folic acid  1 mg Oral Daily    gabapentin  300 mg Oral TID    heparin (porcine)  5,000 Units Subcutaneous Q8H    levothyroxine  75 mcg Oral Before breakfast    lisinopril  10 mg Oral Daily    nicotine  1 patch Transdermal Daily    phenytoin  200 mg Oral BID    And    phenytoin  100 mg Oral Daily    sodium chloride 0.9%  3 mL Intravenous Q8H    thiamine  100 mg Oral Daily     PRN Meds:hydrALAZINE, labetalol, magnesium oxide, magnesium oxide, ondansetron, oxyCODONE-acetaminophen, potassium chloride 10%, potassium chloride 10%, potassium chloride 10%, potassium, sodium phosphates, potassium, sodium phosphates, potassium, sodium phosphates     Review of Systems    Objective:     Weight: 64.7 kg (142 lb 10.2 oz)  Body mass index is 24.48 kg/m².  Vital Signs (Most Recent):  Temp: 98.9 °F (37.2 °C) (04/04/18 1501)  Pulse: 79 (04/04/18 1501)  Resp: 16 (04/04/18 1501)  BP: (!) 108/59 (04/04/18 1501)  SpO2: 100 % (04/04/18 1501) Vital Signs (24h Range):  Temp:  [98.4 °F (36.9 °C)-98.9 °F (37.2 °C)] 98.9 °F (37.2 °C)  Pulse:  [67-89] 79  Resp:  [9-50] 16  SpO2:  [94 %-100 %] 100 %  BP: (108-159)/() 108/59  Arterial Line BP: (110-161)/(57-90) 110/64       Date 04/04/18 0700 - 04/05/18 0659   Shift 0578-8209 9641-1625 7040-6541 24 Hour Total   I  N  T  A  K  E   P.O. 350   350    I.V.  (mL/kg) 1012.5  (15.6) 125  (1.9)  1137.5  (17.6)    Shift Total  (mL/kg) 1362.5  (21.1)  125  (1.9)  1487.5  (23)   O  U  T  P  U  T   Urine  (mL/kg/hr) 1375  (2.7) 350  1725    Shift Total  (mL/kg) 1375  (21.3) 350  (5.4)  1725  (26.7)   Weight (kg) 64.7 64.7 64.7 64.7     Physical Exam:  Nursing note and vitals reviewed.    Constitutional: She appears well-developed and well-nourished.     Abdominal: Soft.     Psych/Behavior: She is alert. She is oriented to person, place, and time. She has a normal mood and affect.     Neurological:   PERRL, EOMI FS TM  GOODWIN 5/5  SILT  No drift         AAOx3, PERRL, EOMI, FS, TM, 5/5 throughout, SILT.    Significant Labs:    Recent Labs  Lab 04/03/18  0225  04/04/18  0342 04/04/18  0602 04/04/18  1117 04/04/18  1459   GLU 96  --  95  --   --   --    *  < > 112*  112* 114* 113* 119*   K 4.5  --  3.8  --   --   --    CL 95  --  86*  --   --   --    CO2 25  --  18*  --   --   --    BUN 5*  --  4*  --   --   --    CREATININE 0.4*  --  0.4*  --   --   --    CALCIUM 8.1*  --  7.9*  --   --   --    MG 1.7  --  1.4*  --   --   --    < > = values in this interval not displayed.    Recent Labs  Lab 04/03/18  0108 04/04/18  0342   WBC 8.75 6.96   HGB 9.3* 9.2*   HCT 26.8* 26.1*    130*     No results for input(s): LABPT, INR, APTT in the last 48 hours.  Microbiology Results (last 7 days)     Procedure Component Value Units Date/Time    Culture, Respiratory with Gram Stain [[id number]] Collected:  04/04/18 1217    Order Status:  Sent Specimen:  Respiratory from Sputum Updated:  04/04/18 1425    Blood culture [[id number]] Collected:  03/31/18 2250    Order Status:  Completed Specimen:  Blood from Peripheral, Antecubital, Right Updated:  04/04/18 0612     Blood Culture, Routine No Growth to date     Blood Culture, Routine No Growth to date     Blood Culture, Routine No Growth to date     Blood Culture, Routine No Growth to date    Narrative:       Blood cultures x 2 different sites. 4 bottles total. Please  draw cultures before administering antibiotics.    Blood  culture [[id number]] Collected:  03/31/18 2112    Order Status:  Completed Specimen:  Blood from Peripheral, Antecubital, Left Updated:  04/04/18 0612     Blood Culture, Routine No Growth to date     Blood Culture, Routine No Growth to date     Blood Culture, Routine No Growth to date     Blood Culture, Routine No Growth to date    Narrative:       Blood cultures from 2 different sites. 4 bottles total.  Please draw before starting antibiotics.    Blood culture [[id number]] Collected:  03/31/18 1533    Order Status:  Completed Specimen:  Blood from Line, Arterial, Right Updated:  04/03/18 1812     Blood Culture, Routine No Growth to date     Blood Culture, Routine No Growth to date     Blood Culture, Routine No Growth to date     Blood Culture, Routine No Growth to date    Blood Culture #2 **CANNOT BE ORDERED STAT** [[id number]] Collected:  03/31/18 1533    Order Status:  Completed Specimen:  Blood from Peripheral, Forearm, Right Updated:  04/03/18 1812     Blood Culture, Routine No Growth to date     Blood Culture, Routine No Growth to date     Blood Culture, Routine No Growth to date     Blood Culture, Routine No Growth to date        All pertinent labs from the last 24 hours have been reviewed.    Significant Diagnostics:  CT: Ct Head Without Contrast    Result Date: 4/3/2018  Stable subdural hemorrhage overlying the right cerebral hemisphere with mild mass effect and leftward midline shift.     Assessment/Plan:     * Acute subdural hematoma    53F PMH presenting with R acute SDH.     Neurointact.   CT head stable  -- Correct hyponatremia per NCC  -- SBP <160.  -- HOB >30.  -- Pain control.   -- Neurochecks q4h,   call NSGY immediately with decline in exam.  -- Medical management per NCC.   Consider Endocrine consult for hyponatremia    Dispo- cont ICU care today. possibel TTF tomrrow.             Zaid Dodd MD  Neurosurgery  [facility]

## 2018-04-04 NOTE — PLAN OF CARE
Problem: Patient Care Overview  Goal: Plan of Care Review  Outcome: Ongoing (interventions implemented as appropriate)  POC reviewed with pt and family at 1400. Pt verbalized understanding. Questions and concerns addressed. No acute events today. Pt progressing toward goals. Patient's headache was minimized, Pain medication frequency was adjusted to every 4 hours. Will continue to monitor. See flowsheets for full assessment and VS info.

## 2018-04-04 NOTE — NURSING
1854H- Patient latest sodium is 114 from 117, Mehdi the NP was notified and stated will take care of it.

## 2018-04-05 LAB
ALBUMIN SERPL BCP-MCNC: 2.7 G/DL
ALP SERPL-CCNC: 53 U/L
ALT SERPL W/O P-5'-P-CCNC: 12 U/L
ANION GAP SERPL CALC-SCNC: 5 MMOL/L
AST SERPL-CCNC: 18 U/L
BACTERIA BLD CULT: NORMAL
BACTERIA BLD CULT: NORMAL
BASOPHILS # BLD AUTO: 0.02 K/UL
BASOPHILS NFR BLD: 0.4 %
BILIRUB SERPL-MCNC: 0.2 MG/DL
BUN SERPL-MCNC: 6 MG/DL
CALCIUM SERPL-MCNC: 8 MG/DL
CHLORIDE SERPL-SCNC: 97 MMOL/L
CO2 SERPL-SCNC: 23 MMOL/L
CREAT SERPL-MCNC: 0.5 MG/DL
CREAT UR-MCNC: 25 MG/DL
DIFFERENTIAL METHOD: ABNORMAL
EOSINOPHIL # BLD AUTO: 0.1 K/UL
EOSINOPHIL NFR BLD: 1.8 %
ERYTHROCYTE [DISTWIDTH] IN BLOOD BY AUTOMATED COUNT: 12.2 %
EST. GFR  (AFRICAN AMERICAN): >60 ML/MIN/1.73 M^2
EST. GFR  (NON AFRICAN AMERICAN): >60 ML/MIN/1.73 M^2
GLUCOSE SERPL-MCNC: 97 MG/DL
HCT VFR BLD AUTO: 24.9 %
HGB BLD-MCNC: 8.8 G/DL
IMM GRANULOCYTES # BLD AUTO: 0.06 K/UL
IMM GRANULOCYTES NFR BLD AUTO: 1.2 %
LYMPHOCYTES # BLD AUTO: 1.8 K/UL
LYMPHOCYTES NFR BLD: 35.4 %
MAGNESIUM SERPL-MCNC: 1.9 MG/DL
MCH RBC QN AUTO: 30.3 PG
MCHC RBC AUTO-ENTMCNC: 35.3 G/DL
MCV RBC AUTO: 86 FL
MONOCYTES # BLD AUTO: 0.7 K/UL
MONOCYTES NFR BLD: 12.8 %
NEUTROPHILS # BLD AUTO: 2.5 K/UL
NEUTROPHILS NFR BLD: 48.4 %
NRBC BLD-RTO: 0 /100 WBC
OSMOLALITY UR: 583 MOSM/KG
OSMOLALITY UR: 629 MOSM/KG
OSMOLALITY UR: 629 MOSM/KG
PHOSPHATE SERPL-MCNC: 2.7 MG/DL
PLATELET # BLD AUTO: 172 K/UL
PMV BLD AUTO: 10.2 FL
POTASSIUM SERPL-SCNC: 4.6 MMOL/L
PROT SERPL-MCNC: 5 G/DL
RBC # BLD AUTO: 2.9 M/UL
SODIUM SERPL-SCNC: 115 MMOL/L
SODIUM SERPL-SCNC: 117 MMOL/L
SODIUM SERPL-SCNC: 117 MMOL/L
SODIUM SERPL-SCNC: 121 MMOL/L
SODIUM SERPL-SCNC: 123 MMOL/L
SODIUM SERPL-SCNC: 125 MMOL/L
SODIUM SERPL-SCNC: 125 MMOL/L
SODIUM UR-SCNC: 210 MMOL/L
SODIUM UR-SCNC: 210 MMOL/L
SODIUM UR-SCNC: 254 MMOL/L
WBC # BLD AUTO: 5.14 K/UL

## 2018-04-05 PROCEDURE — 63600175 PHARM REV CODE 636 W HCPCS: Mod: JG | Performed by: NURSE PRACTITIONER

## 2018-04-05 PROCEDURE — 83735 ASSAY OF MAGNESIUM: CPT

## 2018-04-05 PROCEDURE — 99233 SBSQ HOSP IP/OBS HIGH 50: CPT | Mod: ,,, | Performed by: PHYSICIAN ASSISTANT

## 2018-04-05 PROCEDURE — 99232 SBSQ HOSP IP/OBS MODERATE 35: CPT | Mod: ,,, | Performed by: NEUROLOGICAL SURGERY

## 2018-04-05 PROCEDURE — A4216 STERILE WATER/SALINE, 10 ML: HCPCS | Performed by: STUDENT IN AN ORGANIZED HEALTH CARE EDUCATION/TRAINING PROGRAM

## 2018-04-05 PROCEDURE — 25000003 PHARM REV CODE 250: Performed by: STUDENT IN AN ORGANIZED HEALTH CARE EDUCATION/TRAINING PROGRAM

## 2018-04-05 PROCEDURE — 84295 ASSAY OF SERUM SODIUM: CPT

## 2018-04-05 PROCEDURE — 80053 COMPREHEN METABOLIC PANEL: CPT

## 2018-04-05 PROCEDURE — 83935 ASSAY OF URINE OSMOLALITY: CPT

## 2018-04-05 PROCEDURE — 83935 ASSAY OF URINE OSMOLALITY: CPT | Mod: 91

## 2018-04-05 PROCEDURE — 84300 ASSAY OF URINE SODIUM: CPT

## 2018-04-05 PROCEDURE — 63600175 PHARM REV CODE 636 W HCPCS: Performed by: PHYSICIAN ASSISTANT

## 2018-04-05 PROCEDURE — 85025 COMPLETE CBC W/AUTO DIFF WBC: CPT

## 2018-04-05 PROCEDURE — 25000003 PHARM REV CODE 250: Performed by: NURSE PRACTITIONER

## 2018-04-05 PROCEDURE — 11000001 HC ACUTE MED/SURG PRIVATE ROOM

## 2018-04-05 PROCEDURE — 63600175 PHARM REV CODE 636 W HCPCS: Mod: JG | Performed by: PHYSICIAN ASSISTANT

## 2018-04-05 PROCEDURE — 84300 ASSAY OF URINE SODIUM: CPT | Mod: 91

## 2018-04-05 PROCEDURE — 84100 ASSAY OF PHOSPHORUS: CPT

## 2018-04-05 PROCEDURE — S4991 NICOTINE PATCH NONLEGEND: HCPCS | Performed by: ANESTHESIOLOGY

## 2018-04-05 PROCEDURE — 25000003 PHARM REV CODE 250: Performed by: ANESTHESIOLOGY

## 2018-04-05 PROCEDURE — 97803 MED NUTRITION INDIV SUBSEQ: CPT

## 2018-04-05 PROCEDURE — 94761 N-INVAS EAR/PLS OXIMETRY MLT: CPT

## 2018-04-05 PROCEDURE — 84295 ASSAY OF SERUM SODIUM: CPT | Mod: 91

## 2018-04-05 PROCEDURE — 25000003 PHARM REV CODE 250: Performed by: PSYCHIATRY & NEUROLOGY

## 2018-04-05 PROCEDURE — 25000003 PHARM REV CODE 250: Performed by: PHYSICIAN ASSISTANT

## 2018-04-05 PROCEDURE — 82570 ASSAY OF URINE CREATININE: CPT

## 2018-04-05 PROCEDURE — 99233 SBSQ HOSP IP/OBS HIGH 50: CPT | Mod: ,,, | Performed by: INTERNAL MEDICINE

## 2018-04-05 RX ORDER — DESMOPRESSIN ACETATE 4 UG/ML
0.5 INJECTION, SOLUTION INTRAVENOUS; SUBCUTANEOUS EVERY 6 HOURS
Status: DISCONTINUED | OUTPATIENT
Start: 2018-04-05 | End: 2018-04-05

## 2018-04-05 RX ORDER — DESMOPRESSIN ACETATE 4 UG/ML
0.5 INJECTION, SOLUTION INTRAVENOUS; SUBCUTANEOUS 2 TIMES DAILY
Status: DISCONTINUED | OUTPATIENT
Start: 2018-04-06 | End: 2018-04-05

## 2018-04-05 RX ORDER — AMOXICILLIN 250 MG
2 CAPSULE ORAL DAILY
Status: DISCONTINUED | OUTPATIENT
Start: 2018-04-05 | End: 2018-04-07 | Stop reason: HOSPADM

## 2018-04-05 RX ORDER — ACETAMINOPHEN 325 MG/1
650 TABLET ORAL EVERY 6 HOURS PRN
Status: DISCONTINUED | OUTPATIENT
Start: 2018-04-05 | End: 2018-04-07 | Stop reason: HOSPADM

## 2018-04-05 RX ORDER — TIZANIDINE 2 MG/1
4 TABLET ORAL EVERY 12 HOURS PRN
Status: DISCONTINUED | OUTPATIENT
Start: 2018-04-05 | End: 2018-04-07 | Stop reason: HOSPADM

## 2018-04-05 RX ADMIN — ACETAMINOPHEN 650 MG: 325 TABLET, FILM COATED ORAL at 05:04

## 2018-04-05 RX ADMIN — AMLODIPINE BESYLATE 10 MG: 10 TABLET ORAL at 08:04

## 2018-04-05 RX ADMIN — NICOTINE 1 PATCH: 21 PATCH, EXTENDED RELEASE TRANSDERMAL at 08:04

## 2018-04-05 RX ADMIN — GABAPENTIN 600 MG: 100 CAPSULE ORAL at 02:04

## 2018-04-05 RX ADMIN — ONDANSETRON 8 MG: 8 TABLET, ORALLY DISINTEGRATING ORAL at 06:04

## 2018-04-05 RX ADMIN — ONDANSETRON 8 MG: 8 TABLET, ORALLY DISINTEGRATING ORAL at 02:04

## 2018-04-05 RX ADMIN — HEPARIN SODIUM 5000 UNITS: 5000 INJECTION, SOLUTION INTRAVENOUS; SUBCUTANEOUS at 08:04

## 2018-04-05 RX ADMIN — OXYCODONE AND ACETAMINOPHEN 1 TABLET: 10; 325 TABLET ORAL at 09:04

## 2018-04-05 RX ADMIN — OXYCODONE AND ACETAMINOPHEN 1 TABLET: 10; 325 TABLET ORAL at 06:04

## 2018-04-05 RX ADMIN — GABAPENTIN 300 MG: 300 CAPSULE ORAL at 08:04

## 2018-04-05 RX ADMIN — PHENYTOIN SODIUM 200 MG: 100 CAPSULE ORAL at 08:04

## 2018-04-05 RX ADMIN — FOLIC ACID 1 MG: 1 TABLET ORAL at 08:04

## 2018-04-05 RX ADMIN — LEVOTHYROXINE SODIUM 75 MCG: 75 TABLET ORAL at 05:04

## 2018-04-05 RX ADMIN — GABAPENTIN 600 MG: 100 CAPSULE ORAL at 08:04

## 2018-04-05 RX ADMIN — OXYCODONE AND ACETAMINOPHEN 1 TABLET: 10; 325 TABLET ORAL at 02:04

## 2018-04-05 RX ADMIN — PHENYTOIN SODIUM 100 MG: 100 CAPSULE ORAL at 12:04

## 2018-04-05 RX ADMIN — TIZANIDINE 4 MG: 2 TABLET ORAL at 12:04

## 2018-04-05 RX ADMIN — Medication 3 ML: at 08:04

## 2018-04-05 RX ADMIN — DESMOPRESSIN ACETATE 1 MCG: 4 SOLUTION INTRAVENOUS at 05:04

## 2018-04-05 RX ADMIN — DESMOPRESSIN ACETATE 0.5 MCG: 4 SOLUTION INTRAVENOUS at 12:04

## 2018-04-05 RX ADMIN — LISINOPRIL 10 MG: 10 TABLET ORAL at 08:04

## 2018-04-05 RX ADMIN — Medication 3 ML: at 05:04

## 2018-04-05 RX ADMIN — OXYCODONE AND ACETAMINOPHEN 1 TABLET: 10; 325 TABLET ORAL at 05:04

## 2018-04-05 RX ADMIN — POTASSIUM & SODIUM PHOSPHATES POWDER PACK 280-160-250 MG 2 PACKET: 280-160-250 PACK at 05:04

## 2018-04-05 RX ADMIN — Medication 100 MG: at 08:04

## 2018-04-05 RX ADMIN — OXYCODONE AND ACETAMINOPHEN 1 TABLET: 10; 325 TABLET ORAL at 10:04

## 2018-04-05 RX ADMIN — OXYCODONE AND ACETAMINOPHEN 1 TABLET: 10; 325 TABLET ORAL at 01:04

## 2018-04-05 RX ADMIN — HEPARIN SODIUM 5000 UNITS: 5000 INJECTION, SOLUTION INTRAVENOUS; SUBCUTANEOUS at 05:04

## 2018-04-05 NOTE — PLAN OF CARE
DARWIN sent email to [employer] rep asking for help with the SS disability help on behalf of this Pt.     Court Raphael, FRANK  Neurocritical Care   [facility]  [id number]

## 2018-04-05 NOTE — ASSESSMENT & PLAN NOTE
Chronic hyponatremia in setting of questionable tea/toast diet.   On admission, pt had low Sosm 230 with Na 108, high Mireille 102, high Uosm 387, and euvolemic suggestive of possible SIADH, however would not expect rebound hypernatremia off DDAVP in SIADH as what happened during this admission. And will need to monitor for s/s DI related to     TSH normal   Check 8am cortisol (low suspicion clinically)   Avoid meds related to hyponatremia    Goal Na 120-125  Fluid restrict - attempt <500mL, may be difficult due to thirst    Agree with keeping NS off, agree with stopping D5W.   For now will wean DDAVP dose down to 0.5mcg bid, and will check Na and Uosm q4hr with strict I/O monitoring to determine needs for prn DDAVP.     Criteria for prn DDAVP: all 3 must be met  Na>125 and UOP >250cc x 2 consecutive hrs and Uosm <100    Will discuss with staff

## 2018-04-05 NOTE — NURSING TRANSFER
Nursing Transfer Note      4/5/2018     Transfer 1122    Transfer via wheelchair    Transfer with personal medication bag    Transported by 1 rn, 1 pct    Medicines sent: yes, left with     Chart send with patient: yes    Notified: ABDIFATAH Olivares    Patient reassessed at: 4/5/18 3608    Upon arrival to floor: bed in lowest position, bed locked, call light and personal belongings within reach

## 2018-04-05 NOTE — SUBJECTIVE & OBJECTIVE
PMH, PSH, FH, SH updated and reviewed     Review of Systems   Constitutional: Negative for activity change, appetite change (minimal food intake at baseline), chills, fatigue, fever and unexpected weight change.   Eyes: Negative for visual disturbance.   Respiratory: Positive for cough (chronic cough with increased mucous production attributed to recent cessation of cigarettes while hospitalized). Negative for shortness of breath.    Cardiovascular: Negative for chest pain, palpitations and leg swelling.   Gastrointestinal: Positive for constipation (while hospitalized). Negative for abdominal pain, blood in stool, diarrhea, nausea and vomiting.   Endocrine: Positive for polydipsia (constant, no change from baseline).   Genitourinary: Positive for frequency (chronic, no change from baseline). Negative for decreased urine volume, difficulty urinating and urgency.   Musculoskeletal: Negative for arthralgias and back pain.   Skin: Negative for color change, rash and wound.   Neurological: Positive for seizures and headaches. Negative for tremors, syncope, weakness and numbness.   Hematological: Does not bruise/bleed easily.   Psychiatric/Behavioral: Negative for confusion.            PHYSICAL EXAMINATION:  Vitals:    04/05/18 1400   BP: 113/80   Pulse: 76   Resp: 15   Temp:      Body mass index is 24.48 kg/m².    Physical Exam   Constitutional: She is oriented to person, place, and time. She appears well-developed and well-nourished.   HENT:   Head: Normocephalic and atraumatic.   Eyes: EOM are normal. Pupils are equal, round, and reactive to light.   Neck: Normal range of motion. Neck supple.   Cardiovascular: Normal rate, regular rhythm and normal heart sounds.    Pulmonary/Chest: Effort normal and breath sounds normal.   Abdominal: Soft. Bowel sounds are normal.   Musculoskeletal: She exhibits no edema or tenderness.   Neurological: She is alert and oriented to person, place, and time.   Power: BL UE 5/5, left  LE 4/5, right LE 5/5   Skin: Skin is warm and dry. No erythema. No pallor.

## 2018-04-05 NOTE — ASSESSMENT & PLAN NOTE
Contributing Nutrition Diagnosis  Inadequate energy intake    Related to (etiology):   Inability to consume sufficient energy    Signs and Symptoms (as evidenced by):   NPO with no alternate means for nutrition     Interventions/Recommendations (treatment strategy):  See recs.    Nutrition Diagnosis Status:   Resolved

## 2018-04-05 NOTE — CONSULTS
[facility]  Endocrinology  Diabetes Consult Note    Consult Requested by: Joshua Mac MD   Reason for admit: Acute subdural hematoma    HISTORY OF PRESENT ILLNESS:  Caridad Ortiz is a 53 y.o. female with PHMx EtOH abuse, depression, hypothyroidism who presents to [facility] on 03/31 as transfer from [facility] in [facility] for witnessed seizure, transferred to [facility] where workup revealed an SDH. She was admitted under the service of Neurology Critical Care. On admission was noted to be hyponatremic (Na 108-111.) Was overcorrected, started on D5 & desmopressin. Since then, patient's Na fluctuating, and endocrinology was consulted for recommendations.    Patient first ran into issues with hyponatremia 10 yrs ago when she was found in a coma at home by her  and admitted to [facility]. She reports that she has been hyponatremic since then but uncertain of baseline and has not run into further issues. She notes that she drinks significant amount of fluids at home, mainly diet coke. Associated with minimal food intake due to weight consciousness. Reports constant thirst and urination; no recent change in these symptoms. No recent fever, chills, n/v, abdominal pain. No chest pain, palpitations, dyspnea, lower extremity swelling. No change in sensation, focal motor weakness, generalized weakness, fatigue, or confusion. ROS positive for frontal headache that has been present the last few days; however, today it is more intense.     Pmhx reported as EtOH abuse although patient denies this. Also has hypothyroidism, well-controlled on synthroid. On buspar for depression. Patient lives in a home with multiple roommates, she is independent with ADLs. Current smoker 40pack yr, current EtOH use (~1beer/day), no recreational drug use.     Hospital course:  03/31-Na 108, started on NS gtt 03/31-04/01. Low serum osm 230.  04/01-Na 124, NS gtt  stopped, started on D5  04/02-Na peaked 130, started desmopressin, continue D5  04/03-Na 112, stopped D5 & DDVP, restart NS gtt. Concentrated urine (urine Na 236, urine osm 625.)  04/04- Na increase to 124, urine dilute. Con't NS gtt, DDVP restarted.  04/05-Na stable at 125 --> 121, NS gtt stopped. Endocrinology consulted             PMH, PSH, , SH updated and reviewed     Review of Systems   Constitutional: Negative for activity change, appetite change (minimal food intake at baseline), chills, fatigue, fever and unexpected weight change.   Eyes: Negative for visual disturbance.   Respiratory: Positive for cough (chronic cough with increased mucous production attributed to recent cessation of cigarettes while hospitalized). Negative for shortness of breath.    Cardiovascular: Negative for chest pain, palpitations and leg swelling.   Gastrointestinal: Positive for constipation (while hospitalized). Negative for abdominal pain, blood in stool, diarrhea, nausea and vomiting.   Endocrine: Positive for polydipsia (constant, no change from baseline).   Genitourinary: Positive for frequency (chronic, no change from baseline). Negative for decreased urine volume, difficulty urinating and urgency.   Musculoskeletal: Negative for arthralgias and back pain.   Skin: Negative for color change, rash and wound.   Neurological: Positive for seizures and headaches. Negative for tremors, syncope, weakness and numbness.   Hematological: Does not bruise/bleed easily.   Psychiatric/Behavioral: Negative for confusion.            PHYSICAL EXAMINATION:  Vitals:    04/05/18 1400   BP: 113/80   Pulse: 76   Resp: 15   Temp:      Body mass index is 24.48 kg/m².    Physical Exam   Constitutional: She is oriented to person, place, and time. She appears well-developed and well-nourished.   HENT:   Head: Normocephalic and atraumatic.   Eyes: EOM are normal. Pupils are equal, round, and reactive to light.   Neck: Normal range of motion. Neck  supple.   Cardiovascular: Normal rate, regular rhythm and normal heart sounds.    Pulmonary/Chest: Effort normal and breath sounds normal.   Abdominal: Soft. Bowel sounds are normal.   Musculoskeletal: She exhibits no edema or tenderness.   Neurological: She is alert and oriented to person, place, and time.   Power: BL UE 5/5, left LE 4/5, right LE 5/5   Skin: Skin is warm and dry. No erythema. No pallor.         Labs Reviewed and Include     Recent Labs  Lab 04/05/18  0258  04/05/18  0816   GLU 97  --   --    CALCIUM 8.0*  --   --    ALBUMIN 2.7*  --   --    PROT 5.0*  --   --    *  125*  < > 121*   K 4.6  --   --    CO2 23  --   --    CL 97  --   --    BUN 6  --   --    CREATININE 0.5  --   --    ALKPHOS 53*  --   --    ALT 12  --   --    AST 18  --   --    BILITOT 0.2  --   --    < > = values in this interval not displayed.  Lab Results   Component Value Date    WBC 5.14 04/05/2018    HGB 8.8 (L) 04/05/2018    HCT 24.9 (L) 04/05/2018    MCV 86 04/05/2018     04/05/2018       Recent Labs  Lab 03/31/18  1046   TSH 1.688     Lab Results   Component Value Date    HGBA1C 4.7 04/01/2018       Nutritional status:   Body mass index is 24.48 kg/m².  Lab Results   Component Value Date    ALBUMIN 2.7 (L) 04/05/2018    ALBUMIN 2.7 (L) 04/04/2018    ALBUMIN 2.8 (L) 04/03/2018     No results found for: PREALBUMIN    Estimated Creatinine Clearance: 109.8 mL/min (based on SCr of 0.5 mg/dL).    Accu-Checks  Recent Labs      04/03/18   1251  04/03/18   1817  04/04/18   0102  04/04/18   0522  04/04/18   1120  04/04/18   1747   POCTGLUCOSE  105  90  100  92  92  117*        ASSESSMENT and PLAN    * Acute subdural hematoma    Per neurosurgery        Hyponatremia    Chronic hyponatremia in setting of questionable tea/toast diet.   On admission, pt had low Sosm 230 with Na 108, high Mireille 102, high Uosm 387, and euvolemic suggestive of possible SIADH, however would not expect rebound hypernatremia off DDAVP in SIADH as what  happened during this admission. And will need to monitor for s/s DI related to     TSH normal   Check 8am cortisol (low suspicion clinically)   Avoid meds related to hyponatremia    Agree with keeping NS off.   For now will wean DDAVP dose down to 0.5mcg bid, and will check Na and Uosm q4hr with strict I/O monitoring to determine needs for prn DDAVP.   Drink to thirst. No fluid restriction necessary.    If patient overcorrects, would prefer D5 bolus rather than increased DDAVP.    Will discuss with staff           Seizure after head injury    Per neurosurgery            Plan discussed with patient, family, and RN at bedside.     Modesta Brannon MD  Endocrinology  [facility]

## 2018-04-05 NOTE — PROGRESS NOTES
[facility]  Neurosurgery  Progress Note    Subjective:     History of Present Illness: Patient is a 53 year old female with a history of HTN and seizures, noncompliant on Dilantin who presents today with SDH. Reportedly not on anticoagulation. Patient hyponatremic to 110 on arrival.     Post-Op Info:  * No surgery found *         Interval History: SANDY DO stable    Medications:  Continuous Infusions:    Scheduled Meds:   amLODIPine  10 mg Oral Daily    [START ON 4/6/2018] desmopressin  0.5 mcg Intravenous BID    folic acid  1 mg Oral Daily    gabapentin  600 mg Oral TID    heparin (porcine)  5,000 Units Subcutaneous Q8H    levothyroxine  75 mcg Oral Before breakfast    lisinopril  10 mg Oral Daily    nicotine  1 patch Transdermal Daily    phenytoin  200 mg Oral BID    And    phenytoin  100 mg Oral Daily    senna-docusate 8.6-50 mg  2 tablet Oral Daily    sodium chloride 0.9%  3 mL Intravenous Q8H    thiamine  100 mg Oral Daily     PRN Meds:acetaminophen, hydrALAZINE, labetalol, magnesium oxide, magnesium oxide, ondansetron, oxyCODONE-acetaminophen, potassium chloride 10%, potassium chloride 10%, potassium chloride 10%, potassium, sodium phosphates, potassium, sodium phosphates, potassium, sodium phosphates, tiZANidine     Review of Systems    Objective:     Weight: 64.7 kg (142 lb 10.2 oz)  Body mass index is 24.48 kg/m².  Vital Signs (Most Recent):  Temp: 98.5 °F (36.9 °C) (04/05/18 1500)  Pulse: 73 (04/05/18 1700)  Resp: 20 (04/05/18 1700)  BP: 131/88 (04/05/18 1500)  SpO2: 99 % (04/05/18 1700) Vital Signs (24h Range):  Temp:  [98.2 °F (36.8 °C)-98.9 °F (37.2 °C)] 98.5 °F (36.9 °C)  Pulse:  [68-92] 73  Resp:  [11-36] 20  SpO2:  [96 %-100 %] 99 %  BP: (101-160)/(62-94) 131/88  Arterial Line BP: (100-289)/() 133/79       Date 04/05/18 0700 - 04/06/18 0659   Shift 9115-4473 0984-7853 8813-2600 24 Hour Total   I  N  T  A  K  E   P.O. 120   120    I.V.  (mL/kg) 150  (2.3)    150  (2.3)    Shift Total  (mL/kg) 270  (4.2)   270  (4.2)   O  U  T  P  U  T   Urine  (mL/kg/hr) 765  (1.5) 800  1565    Shift Total  (mL/kg) 765  (11.8) 800  (12.4)  1565  (24.2)   Weight (kg) 64.7 64.7 64.7 64.7     Physical Exam:  Nursing note and vitals reviewed.    Constitutional: She appears well-developed and well-nourished.     Abdominal: Soft.     Psych/Behavior: She is alert. She is oriented to person, place, and time. She has a normal mood and affect.     Neurological:   PERRL, EOMI FS TM  GOODWIN 5/5  SILT  No drift         AAOx3, PERRL, EOMI, FS, TM, 5/5 throughout, SILT.    Significant Labs:    Recent Labs  Lab 04/04/18 0342 04/04/18  1751  04/05/18  0258 04/05/18  0529 04/05/18  0816 04/05/18  1613   GLU 95  --   --   --  97  --   --   --    *  112*  < > 123*  < > 125*  125* 123* 121* 117*  117*   K 3.8  --  4.6  --  4.6  --   --   --    CL 86*  --   --   --  97  --   --   --    CO2 18*  --   --   --  23  --   --   --    BUN 4*  --   --   --  6  --   --   --    CREATININE 0.4*  --   --   --  0.5  --   --   --    CALCIUM 7.9*  --   --   --  8.0*  --   --   --    MG 1.4*  --   --   --  1.9  --   --   --    < > = values in this interval not displayed.    Recent Labs  Lab 04/04/18 0342 04/05/18  0258   WBC 6.96 5.14   HGB 9.2* 8.8*   HCT 26.1* 24.9*   * 172     No results for input(s): LABPT, INR, APTT in the last 48 hours.  Microbiology Results (last 7 days)     Procedure Component Value Units Date/Time    Culture, Respiratory with Gram Stain [[id number]] Collected:  04/04/18 1217    Order Status:  Completed Specimen:  Respiratory from Sputum Updated:  04/05/18 1452     Respiratory Culture Normal respiratory zoe     Gram Stain (Respiratory) <10 epithelial cells per low power field.     Gram Stain (Respiratory) Rare WBC's     Gram Stain (Respiratory) Rare Gram positive cocci    Blood culture [[id number]] Collected:  03/31/18 2250    Order Status:  Completed Specimen:  Blood from  Peripheral, Antecubital, Right Updated:  04/05/18 0612     Blood Culture, Routine No Growth to date     Blood Culture, Routine No Growth to date     Blood Culture, Routine No Growth to date     Blood Culture, Routine No Growth to date     Blood Culture, Routine No Growth to date    Narrative:       Blood cultures x 2 different sites. 4 bottles total. Please  draw cultures before administering antibiotics.    Blood culture [[id number]] Collected:  03/31/18 2112    Order Status:  Completed Specimen:  Blood from Peripheral, Antecubital, Left Updated:  04/05/18 0612     Blood Culture, Routine No Growth to date     Blood Culture, Routine No Growth to date     Blood Culture, Routine No Growth to date     Blood Culture, Routine No Growth to date     Blood Culture, Routine No Growth to date    Narrative:       Blood cultures from 2 different sites. 4 bottles total.  Please draw before starting antibiotics.    Blood culture [[id number]] Collected:  03/31/18 1533    Order Status:  Completed Specimen:  Blood from Line, Arterial, Right Updated:  04/04/18 1812     Blood Culture, Routine No Growth to date     Blood Culture, Routine No Growth to date     Blood Culture, Routine No Growth to date     Blood Culture, Routine No Growth to date     Blood Culture, Routine No Growth to date    Blood Culture #2 **CANNOT BE ORDERED STAT** [[id number]] Collected:  03/31/18 1533    Order Status:  Completed Specimen:  Blood from Peripheral, Forearm, Right Updated:  04/04/18 1812     Blood Culture, Routine No Growth to date     Blood Culture, Routine No Growth to date     Blood Culture, Routine No Growth to date     Blood Culture, Routine No Growth to date     Blood Culture, Routine No Growth to date        All pertinent labs from the last 24 hours have been reviewed.    Significant Diagnostics:  CT: Ct Head Without Contrast    Result Date: 4/3/2018  Stable subdural hemorrhage overlying the right cerebral hemisphere with mild mass effect and  leftward midline shift.     Assessment/Plan:     * Acute subdural hematoma    53F PMH presenting with R acute SDH.     Neurointact.   CT head stable  -- Correct hyponatremia per NCC  -- SBP <160.  -- HOB >30.  -- Pain control.   -- Neurochecks q4h,   call NSGY immediately with decline in exam.  -- Medical management per NCC.       Dispo- cont ICU care today. Possible TTF when Na stable            Nilesh Michael MD  Neurosurgery  [facility]

## 2018-04-05 NOTE — PLAN OF CARE
ICU Attending Note  Neurocritical Care    Severe free water diuresis off DDAVP with Mireille and UK undetectable. Na recovered to 120. Restarted DDAVP at lower dose. Repeat urine lytes still severely concentrated. Severe HA.    [id number]    -increase gabapentin 600 mg q8h  -add tizanidine  -opiates prn  -decrease desmopressin 0.5 mcg IV q6h  -goals O=I today  -500 mL free water restriction  -consult Endocrine to aid in management outside [facility]  -heparin prophylaxis  -coordinate transfer to floor with Neurosurgery v Medicine

## 2018-04-05 NOTE — SUBJECTIVE & OBJECTIVE
Interval History: SANDY DO stable    Medications:  Continuous Infusions:    Scheduled Meds:   amLODIPine  10 mg Oral Daily    [START ON 4/6/2018] desmopressin  0.5 mcg Intravenous BID    folic acid  1 mg Oral Daily    gabapentin  600 mg Oral TID    heparin (porcine)  5,000 Units Subcutaneous Q8H    levothyroxine  75 mcg Oral Before breakfast    lisinopril  10 mg Oral Daily    nicotine  1 patch Transdermal Daily    phenytoin  200 mg Oral BID    And    phenytoin  100 mg Oral Daily    senna-docusate 8.6-50 mg  2 tablet Oral Daily    sodium chloride 0.9%  3 mL Intravenous Q8H    thiamine  100 mg Oral Daily     PRN Meds:acetaminophen, hydrALAZINE, labetalol, magnesium oxide, magnesium oxide, ondansetron, oxyCODONE-acetaminophen, potassium chloride 10%, potassium chloride 10%, potassium chloride 10%, potassium, sodium phosphates, potassium, sodium phosphates, potassium, sodium phosphates, tiZANidine     Review of Systems    Objective:     Weight: 64.7 kg (142 lb 10.2 oz)  Body mass index is 24.48 kg/m².  Vital Signs (Most Recent):  Temp: 98.5 °F (36.9 °C) (04/05/18 1500)  Pulse: 73 (04/05/18 1700)  Resp: 20 (04/05/18 1700)  BP: 131/88 (04/05/18 1500)  SpO2: 99 % (04/05/18 1700) Vital Signs (24h Range):  Temp:  [98.2 °F (36.8 °C)-98.9 °F (37.2 °C)] 98.5 °F (36.9 °C)  Pulse:  [68-92] 73  Resp:  [11-36] 20  SpO2:  [96 %-100 %] 99 %  BP: (101-160)/(62-94) 131/88  Arterial Line BP: (100-289)/() 133/79       Date 04/05/18 0700 - 04/06/18 0659   Shift 4246-0603 3409-1630 0777-1867 24 Hour Total   I  N  T  A  K  E   P.O. 120   120    I.V.  (mL/kg) 150  (2.3)   150  (2.3)    Shift Total  (mL/kg) 270  (4.2)   270  (4.2)   O  U  T  P  U  T   Urine  (mL/kg/hr) 765  (1.5) 800  1565    Shift Total  (mL/kg) 765  (11.8) 800  (12.4)  1565  (24.2)   Weight (kg) 64.7 64.7 64.7 64.7     Physical Exam:  Nursing note and vitals reviewed.    Constitutional: She appears well-developed and well-nourished.     Abdominal: Soft.      Psych/Behavior: She is alert. She is oriented to person, place, and time. She has a normal mood and affect.     Neurological:   PERRL, EOMI FS TM  GOODWIN 5/5  SILT  No drift         AAOx3, PERRL, EOMI, FS, TM, 5/5 throughout, SILT.    Significant Labs:    Recent Labs  Lab 04/04/18  0342  04/04/18  1751  04/05/18  0258 04/05/18  0529 04/05/18  0816 04/05/18  1613   GLU 95  --   --   --  97  --   --   --    *  112*  < > 123*  < > 125*  125* 123* 121* 117*  117*   K 3.8  --  4.6  --  4.6  --   --   --    CL 86*  --   --   --  97  --   --   --    CO2 18*  --   --   --  23  --   --   --    BUN 4*  --   --   --  6  --   --   --    CREATININE 0.4*  --   --   --  0.5  --   --   --    CALCIUM 7.9*  --   --   --  8.0*  --   --   --    MG 1.4*  --   --   --  1.9  --   --   --    < > = values in this interval not displayed.    Recent Labs  Lab 04/04/18  0342 04/05/18  0258   WBC 6.96 5.14   HGB 9.2* 8.8*   HCT 26.1* 24.9*   * 172     No results for input(s): LABPT, INR, APTT in the last 48 hours.  Microbiology Results (last 7 days)     Procedure Component Value Units Date/Time    Culture, Respiratory with Gram Stain [[id number]] Collected:  04/04/18 1217    Order Status:  Completed Specimen:  Respiratory from Sputum Updated:  04/05/18 1452     Respiratory Culture Normal respiratory zoe     Gram Stain (Respiratory) <10 epithelial cells per low power field.     Gram Stain (Respiratory) Rare WBC's     Gram Stain (Respiratory) Rare Gram positive cocci    Blood culture [[id number]] Collected:  03/31/18 2250    Order Status:  Completed Specimen:  Blood from Peripheral, Antecubital, Right Updated:  04/05/18 0612     Blood Culture, Routine No Growth to date     Blood Culture, Routine No Growth to date     Blood Culture, Routine No Growth to date     Blood Culture, Routine No Growth to date     Blood Culture, Routine No Growth to date    Narrative:       Blood cultures x 2 different sites. 4 bottles total. Please  draw  cultures before administering antibiotics.    Blood culture [[id number]] Collected:  03/31/18 2112    Order Status:  Completed Specimen:  Blood from Peripheral, Antecubital, Left Updated:  04/05/18 0612     Blood Culture, Routine No Growth to date     Blood Culture, Routine No Growth to date     Blood Culture, Routine No Growth to date     Blood Culture, Routine No Growth to date     Blood Culture, Routine No Growth to date    Narrative:       Blood cultures from 2 different sites. 4 bottles total.  Please draw before starting antibiotics.    Blood culture [[id number]] Collected:  03/31/18 1533    Order Status:  Completed Specimen:  Blood from Line, Arterial, Right Updated:  04/04/18 1812     Blood Culture, Routine No Growth to date     Blood Culture, Routine No Growth to date     Blood Culture, Routine No Growth to date     Blood Culture, Routine No Growth to date     Blood Culture, Routine No Growth to date    Blood Culture #2 **CANNOT BE ORDERED STAT** [[id number]] Collected:  03/31/18 1533    Order Status:  Completed Specimen:  Blood from Peripheral, Forearm, Right Updated:  04/04/18 1812     Blood Culture, Routine No Growth to date     Blood Culture, Routine No Growth to date     Blood Culture, Routine No Growth to date     Blood Culture, Routine No Growth to date     Blood Culture, Routine No Growth to date        All pertinent labs from the last 24 hours have been reviewed.    Significant Diagnostics:  CT: Ct Head Without Contrast    Result Date: 4/3/2018  Stable subdural hemorrhage overlying the right cerebral hemisphere with mild mass effect and leftward midline shift.

## 2018-04-05 NOTE — ASSESSMENT & PLAN NOTE
Chronic hyponatremia.     Continue fluid restriction today 500 mL/day - ordered dry tray.   Expect rapid free water diuresis so will  Continue to monitor Na and Uosm q4hr and Watch for overcorrection (aim for no more than 8meq/day)  If patient overcorrects, would prefer D5 bolus rather than DDAVP.

## 2018-04-05 NOTE — PROGRESS NOTES
" [facility]  Adult Nutrition  Progress Note    SUMMARY       Recommendations    Recommendation/Intervention:   Continue Low Sodium diet with texture changes per SLP recommendations.     RD to monitor.    Goals: Advancement of diet by RD follow up  Nutrition Goal Status: goal met  Communication of RD Recs: discussed on rounds    Reason for Assessment    Reason for Assessment: RD follow-up  Diagnosis: stroke/CVA  Relevant Medical History: HTN, EtOH abuse, depression, hypothyroidism, seizures  General Information Comments: Pt's diet advanced. Pt reports appetite has returned to baseline.  Nutrition Discharge Planning: adequate po intake for optimal nutrition    Nutrition Risk Screen    Nutrition Risk Screen: no indicators present    Nutrition/Diet History    Patient Reported Diet/Restrictions/Preferences:  (HARVINDER)  Food Preferences: Pt consuming >50% of meals. Requesting to liberalize diet to Regular.  Do you have any cultural, spiritual, Druze conflicts, given your current situation?: none reported  Factors Affecting Nutritional Intake: None identified at this time    Anthropometrics    Temp: 98.6 °F (37 °C)  Height Method: Stated  Height: 5' 4" (162.6 cm)  Height (inches): 64 in  Weight Method: Bed Scale  Weight: 64.7 kg (142 lb 10.2 oz)  Weight (lb): 142.64 lb  Ideal Body Weight (IBW), Female: 120 lb  % Ideal Body Weight, Female (lb): 118.87 lb  BMI (Calculated): 24.5  BMI Grade: 18.5-24.9 - normal  Usual Body Weight (UBW), kg:  (Stable PTA per chart review)       Lab/Procedures/Meds    Pertinent Labs Reviewed: reviewed  Pertinent Labs Comments: Na 121  Pertinent Medications Reviewed: reviewed  Pertinent Medications Comments: folic acid, thiamine    Physical Findings/Assessment    Overall Physical Appearance: nourished  Oral/Mouth Cavity: tooth/teeth missing  Skin: intact    Estimated/Assessed Needs    Weight Used For Calorie Calculations: 64.7 kg (142 lb 10.2 oz)  Energy Calorie Requirements " (kcal): 0251-7207  Energy Need Method: Kcal/kg (25-30)  Protein Requirements: 65-78 gm (1.0-1.2 gm/kg)  Weight Used For Protein Calculations: 64.7 kg (142 lb 10.2 oz)     Fluid Need Method: RDA Method (1 ml/kcal or per MD)  RDA Method (mL): 1618         Nutrition Prescription Ordered    Current Diet Order: Low Sodium    Evaluation of Received Nutrient/Fluid Intake    I/O: -I/O, good UOP, LBM 4/1  % Intake of Estimated Energy Needs: 50 - 75 %  % Meal Intake: 50 - 75 %    Nutrition Risk    Level of Risk/Frequency of Follow-up:  (f/u 1x/week)     Assessment and Plan    * Acute subdural hematoma    Contributing Nutrition Diagnosis  Inadequate energy intake    Related to (etiology):   Inability to consume sufficient energy    Signs and Symptoms (as evidenced by):   NPO with no alternate means for nutrition     Interventions/Recommendations (treatment strategy):  See recs.    Nutrition Diagnosis Status:   Resolved             Monitor and Evaluation    Food and Nutrient Intake: energy intake, food and beverage intake  Food and Nutrient Adminstration: diet order  Physical Activity and Function: nutrition-related ADLs and IADLs  Anthropometric Measurements: weight, weight change, body mass index  Biochemical Data, Medical Tests and Procedures: electrolyte and renal panel, gastrointestinal profile, glucose/endocrine profile, inflammatory profile  Nutrition-Focused Physical Findings: overall appearance     Nutrition Follow-Up    RD Follow-up?: Yes

## 2018-04-05 NOTE — PLAN OF CARE
Problem: Patient Care Overview  Goal: Plan of Care Review  Outcome: Ongoing (interventions implemented as appropriate)  POC reviewed with pt and family at 1400. Pt verbalized understanding. Questions and concerns addressed. No acute events today. Orders to TTF today. Pt progressing toward goals. Will continue to monitor. See flowsheets for full assessment and VS info.

## 2018-04-05 NOTE — ASSESSMENT & PLAN NOTE
53F PMH presenting with R acute SDH.     Neurointact.   CT head stable  -- Correct hyponatremia per NCC  -- SBP <160.  -- HOB >30.  -- Pain control.   -- Neurochecks q4h,   call NSGY immediately with decline in exam.  -- Medical management per NCC.       Dispo- cont ICU care today. Possible TTF when Na stable

## 2018-04-05 NOTE — HPI
Caridad Ortiz is a 53 y.o. female with PHMx EtOH abuse, depression, hypothyroidism who presents to [facility] on 03/31 as transfer from [facility] in [facility] for witnessed seizure, transferred to [facility] where workup revealed an SDH. She was admitted under the service of Neurology Critical Care. On admission was noted to be hyponatremic (Na 108-111.) Was overcorrected, started on D5 & desmopressin. Since then, patient's Na fluctuating, and endocrinology was consulted for recommendations.    Patient first ran into issues with hyponatremia 10 yrs ago when she was found in a coma at home by her  and admitted to [facility]. She reports that she has been hyponatremic since then but uncertain of baseline and has not run into further issues. She notes that she drinks significant amount of fluids at home, mainly diet coke. Associated with minimal food intake due to weight consciousness. Reports constant thirst and urination; no recent change in these symptoms. No recent fever, chills, n/v, abdominal pain. No chest pain, palpitations, dyspnea, lower extremity swelling. No change in sensation, focal motor weakness, generalized weakness, fatigue, or confusion. ROS positive for frontal headache that has been present the last few days; however, today it is more intense.     Pmhx reported as EtOH abuse although patient denies this. Also has hypothyroidism, well-controlled on synthroid. On buspar for depression. Patient lives in a home with multiple roommates, she is independent with ADLs. Current smoker 40pack yr, current EtOH use (~1beer/day), no recreational drug use.     Hospital course:  03/31-Na 108, started on NS gtt 03/31-04/01. Low serum osm 230.  04/01-Na 124, NS gtt stopped, started on D5  04/02-Na peaked 130, started desmopressin, continue D5  04/03-Na 112, stopped D5 & DDVP, restart NS gtt. Concentrated urine (urine Na 236, urine osm 625.)  04/04- Na  increase to 124, urine dilute. Con't NS gtt, DDVP restarted.  04/05-Na stable at 125 --> 121, NS gtt stopped. Endocrinology consulted.

## 2018-04-06 LAB
ALBUMIN SERPL BCP-MCNC: 2.9 G/DL
ALP SERPL-CCNC: 59 U/L
ALT SERPL W/O P-5'-P-CCNC: 12 U/L
ANION GAP SERPL CALC-SCNC: 8 MMOL/L
AST SERPL-CCNC: 14 U/L
BACTERIA BLD CULT: NORMAL
BACTERIA BLD CULT: NORMAL
BACTERIA SPEC AEROBE CULT: NORMAL
BASOPHILS # BLD AUTO: 0.03 K/UL
BASOPHILS NFR BLD: 0.6 %
BILIRUB SERPL-MCNC: 0.2 MG/DL
BUN SERPL-MCNC: 5 MG/DL
CALCIUM SERPL-MCNC: 8.7 MG/DL
CHLORIDE SERPL-SCNC: 88 MMOL/L
CO2 SERPL-SCNC: 21 MMOL/L
CREAT SERPL-MCNC: 0.5 MG/DL
DIFFERENTIAL METHOD: ABNORMAL
EOSINOPHIL # BLD AUTO: 0.2 K/UL
EOSINOPHIL NFR BLD: 3.8 %
ERYTHROCYTE [DISTWIDTH] IN BLOOD BY AUTOMATED COUNT: 12.2 %
EST. GFR  (AFRICAN AMERICAN): >60 ML/MIN/1.73 M^2
EST. GFR  (NON AFRICAN AMERICAN): >60 ML/MIN/1.73 M^2
GLUCOSE SERPL-MCNC: 86 MG/DL
GRAM STN SPEC: NORMAL
HCT VFR BLD AUTO: 27.8 %
HGB BLD-MCNC: 9.7 G/DL
IMM GRANULOCYTES # BLD AUTO: 0.05 K/UL
IMM GRANULOCYTES NFR BLD AUTO: 1 %
LYMPHOCYTES # BLD AUTO: 2.1 K/UL
LYMPHOCYTES NFR BLD: 44.9 %
MAGNESIUM SERPL-MCNC: 1.5 MG/DL
MCH RBC QN AUTO: 29.9 PG
MCHC RBC AUTO-ENTMCNC: 34.9 G/DL
MCV RBC AUTO: 86 FL
MONOCYTES # BLD AUTO: 0.7 K/UL
MONOCYTES NFR BLD: 14.3 %
NEUTROPHILS # BLD AUTO: 1.7 K/UL
NEUTROPHILS NFR BLD: 35.4 %
NRBC BLD-RTO: 0 /100 WBC
OSMOLALITY UR: 104 MOSM/KG
OSMOLALITY UR: 114 MOSM/KG
OSMOLALITY UR: 246 MOSM/KG
OSMOLALITY UR: 536 MOSM/KG
OSMOLALITY UR: 82 MOSM/KG
OSMOLALITY UR: 97 MOSM/KG
PHOSPHATE SERPL-MCNC: 3.4 MG/DL
PLATELET # BLD AUTO: 198 K/UL
PMV BLD AUTO: 9.7 FL
POTASSIUM SERPL-SCNC: 4.2 MMOL/L
PROT SERPL-MCNC: 5.3 G/DL
RBC # BLD AUTO: 3.24 M/UL
SODIUM SERPL-SCNC: 115 MMOL/L
SODIUM SERPL-SCNC: 117 MMOL/L
SODIUM SERPL-SCNC: 117 MMOL/L
SODIUM SERPL-SCNC: 119 MMOL/L
SODIUM SERPL-SCNC: 122 MMOL/L
SODIUM SERPL-SCNC: 124 MMOL/L
SODIUM SERPL-SCNC: 125 MMOL/L
SODIUM UR-SCNC: 143 MMOL/L
SODIUM UR-SCNC: 53 MMOL/L
SODIUM UR-SCNC: <20 MMOL/L
URATE SERPL-MCNC: 1 MG/DL
WBC # BLD AUTO: 4.77 K/UL

## 2018-04-06 PROCEDURE — S4991 NICOTINE PATCH NONLEGEND: HCPCS | Performed by: ANESTHESIOLOGY

## 2018-04-06 PROCEDURE — 36415 COLL VENOUS BLD VENIPUNCTURE: CPT

## 2018-04-06 PROCEDURE — 99232 SBSQ HOSP IP/OBS MODERATE 35: CPT | Mod: ,,, | Performed by: NEUROLOGICAL SURGERY

## 2018-04-06 PROCEDURE — 25000003 PHARM REV CODE 250: Performed by: PSYCHIATRY & NEUROLOGY

## 2018-04-06 PROCEDURE — 83735 ASSAY OF MAGNESIUM: CPT

## 2018-04-06 PROCEDURE — 84300 ASSAY OF URINE SODIUM: CPT | Mod: 91

## 2018-04-06 PROCEDURE — A4216 STERILE WATER/SALINE, 10 ML: HCPCS | Performed by: STUDENT IN AN ORGANIZED HEALTH CARE EDUCATION/TRAINING PROGRAM

## 2018-04-06 PROCEDURE — 83935 ASSAY OF URINE OSMOLALITY: CPT

## 2018-04-06 PROCEDURE — 25000003 PHARM REV CODE 250: Performed by: PHYSICIAN ASSISTANT

## 2018-04-06 PROCEDURE — 84550 ASSAY OF BLOOD/URIC ACID: CPT

## 2018-04-06 PROCEDURE — 25000003 PHARM REV CODE 250: Performed by: STUDENT IN AN ORGANIZED HEALTH CARE EDUCATION/TRAINING PROGRAM

## 2018-04-06 PROCEDURE — 80053 COMPREHEN METABOLIC PANEL: CPT

## 2018-04-06 PROCEDURE — 84295 ASSAY OF SERUM SODIUM: CPT

## 2018-04-06 PROCEDURE — 84295 ASSAY OF SERUM SODIUM: CPT | Mod: 91

## 2018-04-06 PROCEDURE — 99232 SBSQ HOSP IP/OBS MODERATE 35: CPT | Mod: ,,, | Performed by: INTERNAL MEDICINE

## 2018-04-06 PROCEDURE — 63600175 PHARM REV CODE 636 W HCPCS: Performed by: PHYSICIAN ASSISTANT

## 2018-04-06 PROCEDURE — 11000001 HC ACUTE MED/SURG PRIVATE ROOM

## 2018-04-06 PROCEDURE — 25000003 PHARM REV CODE 250: Performed by: NURSE PRACTITIONER

## 2018-04-06 PROCEDURE — 85025 COMPLETE CBC W/AUTO DIFF WBC: CPT

## 2018-04-06 PROCEDURE — 84300 ASSAY OF URINE SODIUM: CPT

## 2018-04-06 PROCEDURE — 84100 ASSAY OF PHOSPHORUS: CPT

## 2018-04-06 PROCEDURE — 83935 ASSAY OF URINE OSMOLALITY: CPT | Mod: 91

## 2018-04-06 PROCEDURE — 25000003 PHARM REV CODE 250: Performed by: ANESTHESIOLOGY

## 2018-04-06 RX ORDER — OXYCODONE HYDROCHLORIDE 5 MG/1
10 TABLET ORAL EVERY 4 HOURS PRN
Status: DISCONTINUED | OUTPATIENT
Start: 2018-04-06 | End: 2018-04-07 | Stop reason: HOSPADM

## 2018-04-06 RX ADMIN — HEPARIN SODIUM 5000 UNITS: 5000 INJECTION, SOLUTION INTRAVENOUS; SUBCUTANEOUS at 06:04

## 2018-04-06 RX ADMIN — HEPARIN SODIUM 5000 UNITS: 5000 INJECTION, SOLUTION INTRAVENOUS; SUBCUTANEOUS at 10:04

## 2018-04-06 RX ADMIN — OXYCODONE HYDROCHLORIDE 10 MG: 5 TABLET ORAL at 06:04

## 2018-04-06 RX ADMIN — TIZANIDINE 4 MG: 2 TABLET ORAL at 11:04

## 2018-04-06 RX ADMIN — PHENYTOIN SODIUM 200 MG: 100 CAPSULE ORAL at 08:04

## 2018-04-06 RX ADMIN — Medication 3 ML: at 02:04

## 2018-04-06 RX ADMIN — PHENYTOIN SODIUM 100 MG: 100 CAPSULE ORAL at 11:04

## 2018-04-06 RX ADMIN — GABAPENTIN 600 MG: 100 CAPSULE ORAL at 08:04

## 2018-04-06 RX ADMIN — OXYCODONE AND ACETAMINOPHEN 1 TABLET: 10; 325 TABLET ORAL at 02:04

## 2018-04-06 RX ADMIN — OXYCODONE HYDROCHLORIDE 10 MG: 5 TABLET ORAL at 02:04

## 2018-04-06 RX ADMIN — FOLIC ACID 1 MG: 1 TABLET ORAL at 08:04

## 2018-04-06 RX ADMIN — AMLODIPINE BESYLATE 10 MG: 10 TABLET ORAL at 08:04

## 2018-04-06 RX ADMIN — PHENYTOIN SODIUM 200 MG: 100 CAPSULE ORAL at 10:04

## 2018-04-06 RX ADMIN — TIZANIDINE 4 MG: 2 TABLET ORAL at 10:04

## 2018-04-06 RX ADMIN — ACETAMINOPHEN 650 MG: 325 TABLET, FILM COATED ORAL at 01:04

## 2018-04-06 RX ADMIN — OXYCODONE AND ACETAMINOPHEN 1 TABLET: 10; 325 TABLET ORAL at 10:04

## 2018-04-06 RX ADMIN — STANDARDIZED SENNA CONCENTRATE AND DOCUSATE SODIUM 2 TABLET: 8.6; 5 TABLET, FILM COATED ORAL at 08:04

## 2018-04-06 RX ADMIN — Medication 3 ML: at 06:04

## 2018-04-06 RX ADMIN — LEVOTHYROXINE SODIUM 75 MCG: 75 TABLET ORAL at 06:04

## 2018-04-06 RX ADMIN — NICOTINE 1 PATCH: 21 PATCH, EXTENDED RELEASE TRANSDERMAL at 08:04

## 2018-04-06 RX ADMIN — LISINOPRIL 10 MG: 10 TABLET ORAL at 08:04

## 2018-04-06 RX ADMIN — TIZANIDINE 4 MG: 2 TABLET ORAL at 12:04

## 2018-04-06 RX ADMIN — HEPARIN SODIUM 5000 UNITS: 5000 INJECTION, SOLUTION INTRAVENOUS; SUBCUTANEOUS at 02:04

## 2018-04-06 RX ADMIN — Medication 100 MG: at 08:04

## 2018-04-06 RX ADMIN — OXYCODONE AND ACETAMINOPHEN 1 TABLET: 10; 325 TABLET ORAL at 06:04

## 2018-04-06 RX ADMIN — OXYCODONE HYDROCHLORIDE 10 MG: 5 TABLET ORAL at 10:04

## 2018-04-06 RX ADMIN — GABAPENTIN 600 MG: 100 CAPSULE ORAL at 10:04

## 2018-04-06 RX ADMIN — GABAPENTIN 600 MG: 100 CAPSULE ORAL at 02:04

## 2018-04-06 NOTE — PLAN OF CARE
CM met with patient to discuss discharge plan. Patient lives with her friend. Patient is not insured and will not be able to afford HH or OP Therapy which is the rec per PT/OT. CM advised patient to discuss with PT exercised that she can do at home once discharged. Patient states that she gets her meds from [facility] mail order. Cm advised patient that is a medication that is required is not in her current med list to advise CM so that CM can get filled at [facility] and do a cost transfer to Case Management. Patient states she is already taking seizure meds.    Planned discharge is home with friend - Plan (A) or home with friend - Plan (B).     04/06/18 1140   Discharge Reassessment   Assessment Type Discharge Planning Reassessment   Provided patient/caregiver education on the expected discharge date and the discharge plan No   Do you have any problems affording any of your prescribed medications? No   Discharge Plan A Home with family   Discharge Plan B Home with family   Patient choice form signed by patient/caregiver N/A   Can the patient answer the patient profile reliably? Yes, cognitively intact   How does the patient rate their overall health at the present time? Fair   Describe the patient's ability to walk at the present time. No restrictions   How often would a person be available to care for the patient? Often   Number of comorbid conditions (as recorded on the chart) Two   During the past month, has the patient often been bothered by feeling down, depressed or hopeless? No   During the past month, has the patient often been bothered by little interest or pleasure in doing things? No

## 2018-04-06 NOTE — PROGRESS NOTES
[facility]  Endocrinology  Progress Note    Admit Date: 3/31/2018     Caridad Ortiz is a 53 y.o. female with PHMx EtOH abuse, depression, hypothyroidism who presents to [facility] on 03/31 as transfer from [facility] in [facility] for witnessed seizure, transferred to [facility] where workup revealed an SDH. She was admitted under the service of Neurology Critical Care. On admission was noted to be hyponatremic (Na 108-111.) Was overcorrected, started on D5 & desmopressin. Since then, patient's Na fluctuating, and endocrinology was consulted for recommendations.    Patient first ran into issues with hyponatremia 10 yrs ago when she was found in a coma at home by her  and admitted to [facility]. She reports that she has been hyponatremic since then but uncertain of baseline and has not run into further issues. She notes that she drinks significant amount of fluids at home, mainly diet coke. Associated with minimal food intake due to weight consciousness. Reports constant thirst and urination; no recent change in these symptoms. No recent fever, chills, n/v, abdominal pain. No chest pain, palpitations, dyspnea, lower extremity swelling. No change in sensation, focal motor weakness, generalized weakness, fatigue, or confusion. ROS positive for frontal headache that has been present the last few days; however, today it is more intense.     Pmhx reported as EtOH abuse although patient denies this. Also has hypothyroidism, well-controlled on synthroid. On buspar for depression. Patient lives in a home with multiple roommates, she is independent with ADLs. Current smoker 40pack yr, current EtOH use (~1beer/day), no recreational drug use.     Hospital course:  03/31-Na 108, started on NS gtt 03/31-04/01. Low serum osm 230.  04/01-Na 124, NS gtt stopped, started on D5  04/02-Na peaked 130, started desmopressin, continue D5  04/03-Na 112, stopped D5 &  "DDVP, restart NS gtt. Concentrated urine (urine Na 236, urine osm 625.)  04/04- Na increase to 124, urine dilute. Con't NS gtt, DDVP restarted.  04/05-Na stable at 125 --> 121, NS gtt stopped. Endocrinology consulted.         Interval HPI:   Overnight events:  Normal mental status   Na decreased to 115. Made NPO overnight. Na increased to 119 this morning.   Last ddavp at 12p yesterday.   UOP being measured q4 hrs, not hourly, and ranging avg 200cc/hr     /65 (Patient Position: Lying)   Pulse 69   Temp 97.7 °F (36.5 °C) (Oral)   Resp 18   Ht 5' 4" (1.626 m)   Wt 64.7 kg (142 lb 10.2 oz)   SpO2 98%   Breastfeeding? No   BMI 24.48 kg/m²       Labs Reviewed and Include      Recent Labs  Lab 04/06/18  0430 04/06/18  0805   GLU 86  --    CALCIUM 8.7  --    ALBUMIN 2.9*  --    PROT 5.3*  --    *  117* 119*   K 4.2  --    CO2 21*  --    CL 88*  --    BUN 5*  --    CREATININE 0.5  --    ALKPHOS 59  --    ALT 12  --    AST 14  --    BILITOT 0.2  --      Lab Results   Component Value Date    WBC 4.77 04/06/2018    HGB 9.7 (L) 04/06/2018    HCT 27.8 (L) 04/06/2018    MCV 86 04/06/2018     04/06/2018       Recent Labs  Lab 03/31/18  1046   TSH 1.688     Lab Results   Component Value Date    HGBA1C 4.7 04/01/2018       Nutritional status:   Body mass index is 24.48 kg/m².  Lab Results   Component Value Date    ALBUMIN 2.9 (L) 04/06/2018    ALBUMIN 2.7 (L) 04/05/2018    ALBUMIN 2.7 (L) 04/04/2018     No results found for: PREALBUMIN    Estimated Creatinine Clearance: 109.8 mL/min (based on SCr of 0.5 mg/dL).    Accu-Checks  Recent Labs      04/03/18   1251  04/03/18   1817  04/04/18   0102  04/04/18   0522  04/04/18   1120  04/04/18   1747   POCTGLUCOSE  105  90  100  92  92  117*       Current Medications and/or Treatments Impacting Glycemic Control  Immunotherapy:  Immunosuppressants     None        Steroids:   Hormones     None        Pressors:    Autonomic Drugs     None    "     Hyperglycemia/Diabetes Medications: Antihyperglycemics     None          ASSESSMENT and PLAN    * Acute subdural hematoma    Per neurosurgery        Hyponatremia    Admit labs with hyponatremia and high Uosm and Mireille c/w SIADH vs CSW. Her Na subsequently improved with NS which would be more suggestive of CSW. Na then dropped with scheduled DDAVP. Expect that DDAVP out of her system by now.     After overnight fast, Na improved from 115 to 119, but with evidence of free water diuresis given UOP 200cc/hr, low Mireille/Uosm, which is appropriate response to hyponatremia.      Advance diet and allow pt to drink - fluid restrict 800ml   Continue to monitor Na and Uosm q4hr   Watch for overcorrection (aim for no more than 8meq/day)  If patient overcorrects, would prefer loosening fluid restriction and prn ddavp in small dose.   Conversely, if Na remains low, can try salt tabs     If Na remains improving at appropriate rate, will remove fluid restriction later today.            Modesta Brannon MD  Endocrinology  [facility]    >>>>>>>>>>>>>>>>>>    I have reviewed the case, examined the patient, discussed with the fellow, and concur with the fellow's history, physical, assessment, and plan.    Na improving slowly off DAVP, will aim for improvement of 8mmol/L per 24hr period.  Will adjust fluid restrictions towards this goal.     Breana Carey MD

## 2018-04-06 NOTE — SUBJECTIVE & OBJECTIVE
"Interval HPI:   Overnight events:  Normal mental status   Na decreased to 115. Made NPO overnight. Na increased to 119 this morning.   Last ddavp at 12p yesterday.   UOP being measured q4 hrs, not hourly, and ranging avg 200cc/hr     /65 (Patient Position: Lying)   Pulse 69   Temp 97.7 °F (36.5 °C) (Oral)   Resp 18   Ht 5' 4" (1.626 m)   Wt 64.7 kg (142 lb 10.2 oz)   SpO2 98%   Breastfeeding? No   BMI 24.48 kg/m²     Labs Reviewed and Include      Recent Labs  Lab 04/06/18  0430 04/06/18  0805   GLU 86  --    CALCIUM 8.7  --    ALBUMIN 2.9*  --    PROT 5.3*  --    *  117* 119*   K 4.2  --    CO2 21*  --    CL 88*  --    BUN 5*  --    CREATININE 0.5  --    ALKPHOS 59  --    ALT 12  --    AST 14  --    BILITOT 0.2  --      Lab Results   Component Value Date    WBC 4.77 04/06/2018    HGB 9.7 (L) 04/06/2018    HCT 27.8 (L) 04/06/2018    MCV 86 04/06/2018     04/06/2018       Recent Labs  Lab 03/31/18  1046   TSH 1.688     Lab Results   Component Value Date    HGBA1C 4.7 04/01/2018       Nutritional status:   Body mass index is 24.48 kg/m².  Lab Results   Component Value Date    ALBUMIN 2.9 (L) 04/06/2018    ALBUMIN 2.7 (L) 04/05/2018    ALBUMIN 2.7 (L) 04/04/2018     No results found for: PREALBUMIN    Estimated Creatinine Clearance: 109.8 mL/min (based on SCr of 0.5 mg/dL).    Accu-Checks  Recent Labs      04/03/18   1251  04/03/18   1817  04/04/18   0102  04/04/18   0522  04/04/18   1120  04/04/18   1747   POCTGLUCOSE  105  90  100  92  92  117*       Current Medications and/or Treatments Impacting Glycemic Control  Immunotherapy:  Immunosuppressants     None        Steroids:   Hormones     None        Pressors:    Autonomic Drugs     None        Hyperglycemia/Diabetes Medications: Antihyperglycemics     None        "

## 2018-04-06 NOTE — ASSESSMENT & PLAN NOTE
--CT head stable and exam unchanged  --goal sodium low 120s, endocrine now following.   --Monitor for signs of ODS  -Fluid restrict   --cont dilantin  --ttf

## 2018-04-06 NOTE — ASSESSMENT & PLAN NOTE
-Heavy beer drinker, likely secondary to potomania  -- overcorrection on admission   -Follow sodiums q 4  --endocrine

## 2018-04-06 NOTE — PLAN OF CARE
Problem: Patient Care Overview  Goal: Plan of Care Review  Outcome: Ongoing (interventions implemented as appropriate)  Patient remained free from falls or injury. All care explained. Questions addressed. Tele intact. Bed in low and locked position. Call light within reach.

## 2018-04-06 NOTE — SUBJECTIVE & OBJECTIVE
Interval History: SANDY DO stable    Medications:  Continuous Infusions:    Scheduled Meds:   amLODIPine  10 mg Oral Daily    folic acid  1 mg Oral Daily    gabapentin  600 mg Oral TID    heparin (porcine)  5,000 Units Subcutaneous Q8H    levothyroxine  75 mcg Oral Before breakfast    lisinopril  10 mg Oral Daily    nicotine  1 patch Transdermal Daily    phenytoin  200 mg Oral BID    And    phenytoin  100 mg Oral Daily    senna-docusate 8.6-50 mg  2 tablet Oral Daily    sodium chloride 0.9%  3 mL Intravenous Q8H    thiamine  100 mg Oral Daily     PRN Meds:acetaminophen, labetalol, magnesium oxide, magnesium oxide, ondansetron, oxyCODONE, potassium chloride 10%, potassium chloride 10%, potassium chloride 10%, potassium, sodium phosphates, potassium, sodium phosphates, potassium, sodium phosphates, tiZANidine     Review of Systems    Objective:     Weight: 64.7 kg (142 lb 10.2 oz)  Body mass index is 24.48 kg/m².  Vital Signs (Most Recent):  Temp: 98.6 °F (37 °C) (04/06/18 1115)  Pulse: 73 (04/06/18 1115)  Resp: 17 (04/06/18 1115)  BP: 95/69 (04/06/18 1115)  SpO2: 99 % (04/06/18 1115) Vital Signs (24h Range):  Temp:  [97.7 °F (36.5 °C)-98.6 °F (37 °C)] 98.6 °F (37 °C)  Pulse:  [68-82] 73  Resp:  [15-21] 17  SpO2:  [95 %-99 %] 99 %  BP: ()/(65-88) 95/69  Arterial Line BP: (133-289)/() 133/79       Date 04/06/18 0700 - 04/07/18 0659   Shift 3370-2120 8825-4362 7267-2504 24 Hour Total   I  N  T  A  K  E   P.O. 50   50    Shift Total  (mL/kg) 50  (0.8)   50  (0.8)   O  U  T  P  U  T   Urine  (mL/kg/hr) 1300   1300    Shift Total  (mL/kg) 1300  (20.1)   1300  (20.1)   Weight (kg) 64.7 64.7 64.7 64.7     Physical Exam:  Nursing note and vitals reviewed.    Constitutional: She appears well-developed and well-nourished.     Abdominal: Soft.     Psych/Behavior: She is alert. She is oriented to person, place, and time. She has a normal mood and affect.     Neurological:   PERRL, EOMI FS TM  CHRISTA  5/5  SILT  No drift         AAOx3, PERRL, EOMI, FS, TM, 5/5 throughout, SILT.    Significant Labs:    Recent Labs  Lab 04/04/18  1751  04/05/18  0258  04/06/18  0430 04/06/18  0805 04/06/18  1211   GLU  --   --  97  --  86  --   --    *  < > 125*  125*  < > 117*  117* 119* 122*   K 4.6  --  4.6  --  4.2  --   --    CL  --   --  97  --  88*  --   --    CO2  --   --  23  --  21*  --   --    BUN  --   --  6  --  5*  --   --    CREATININE  --   --  0.5  --  0.5  --   --    CALCIUM  --   --  8.0*  --  8.7  --   --    MG  --   --  1.9  --  1.5*  --   --    < > = values in this interval not displayed.    Recent Labs  Lab 04/05/18  0258 04/06/18  0430   WBC 5.14 4.77   HGB 8.8* 9.7*   HCT 24.9* 27.8*    198     No results for input(s): LABPT, INR, APTT in the last 48 hours.  Microbiology Results (last 7 days)     Procedure Component Value Units Date/Time    Culture, Respiratory with Gram Stain [[id number]] Collected:  04/04/18 1217    Order Status:  Completed Specimen:  Respiratory from Sputum Updated:  04/06/18 0717     Respiratory Culture Normal respiratory zoe     Gram Stain (Respiratory) <10 epithelial cells per low power field.     Gram Stain (Respiratory) Rare WBC's     Gram Stain (Respiratory) Rare Gram positive cocci    Blood culture [[id number]] Collected:  03/31/18 2112    Order Status:  Completed Specimen:  Blood from Peripheral, Antecubital, Left Updated:  04/06/18 0612     Blood Culture, Routine No growth after 5 days.    Narrative:       Blood cultures from 2 different sites. 4 bottles total.  Please draw before starting antibiotics.    Blood culture [[id number]] Collected:  03/31/18 2250    Order Status:  Completed Specimen:  Blood from Peripheral, Antecubital, Right Updated:  04/06/18 0612     Blood Culture, Routine No growth after 5 days.    Narrative:       Blood cultures x 2 different sites. 4 bottles total. Please  draw cultures before administering antibiotics.    Blood culture [[id number]]  Collected:  03/31/18 1533    Order Status:  Completed Specimen:  Blood from Line, Arterial, Right Updated:  04/05/18 1812     Blood Culture, Routine No growth after 5 days.    Blood Culture #2 **CANNOT BE ORDERED STAT** [[id number]] Collected:  03/31/18 1533    Order Status:  Completed Specimen:  Blood from Peripheral, Forearm, Right Updated:  04/05/18 1812     Blood Culture, Routine No growth after 5 days.        All pertinent labs from the last 24 hours have been reviewed.    Significant Diagnostics:  CT: Ct Head Without Contrast    Result Date: 4/3/2018  Stable subdural hemorrhage overlying the right cerebral hemisphere with mild mass effect and leftward midline shift.

## 2018-04-06 NOTE — TREATMENT PLAN
Follow up   Na 117   Hold evening dose of DDAVP    Repeat Na in 4 hrs - at 8p  If continues to trend down, may give NS bolus (as historically improved with NS on this admission)

## 2018-04-06 NOTE — PROGRESS NOTES
[facility]  Neurocritical Care  Progress Note    Admit Date: 3/31/2018  Service Date: 04/05/2018  Length of Stay: 5    Subjective:     Chief Complaint: Acute subdural hematoma    History of Present Illness: Caridad Ortiz is a 53 y.o. female with PHMx EtOH abuse, depression, hypothyroidism who presents as transfer from [facility] in [facility] for witnessed seizure. She was provided with versed and transported here when workup noted an ICH. In addition she may have a history of seizures based on documentation from OSH and a negative dilantin level was drawn but no dilantin or other AEDs were found in home medications sent along with her. On arrival NSGY was consulted emergently and are currently evaluating the patient.    Of note the patients medications have a bottle of gabapentin and a bottle of tizanidine, both filled at the beginning of the month, that are empty.     HPI is based off of recollections from other providers including EMS and ED providers as well as the medical record due to intubation.     Hospital Course: --3/31--once safely in ICU, sedation stopped and patient successfully extubated to room air. Repeat imaging of head stable and neuro exam without decline  --sodium chronically low , 110 on admission. Over corrected since admission with NS. D5W gtt today and DDAVP . Stable neuro exam although patient complains of chronic pain. Restarting home gabapentin. Following Na q6 with goal to low 120s by end of the day. Most recent Na 127.   4/3:  continue Desmopressin x 1 more day, NA goal mid 120's, 1 liter fluid restriction, + amlodipine   4/4: stop desmopressin, change pain meds to q 4, na q 3 follow urine lytes     INTERVAL HISTORY- NAEON. Endocrine consulted for assistance with sodium management. From neurologic perspective, stable to step down to floor under NSGY service.           Review of Systems:Constitutional: Denies fevers or chills.  Pulmonary: Denies  shortness of breath or cough.  Cardiology: Denies chest pain or palpitations.  GI: Denies abdominal pain or constipation.  Neurologic: Denies new weakness, headache, or paresthesias.  MS- reports consistent chronic back pain     Vitals:   Temp: 98.4 °F (36.9 °C)  Pulse: 72  Rhythm: normal sinus rhythm  BP: 138/85  MAP (mmHg): 103  Resp: 15  SpO2: 98 %  O2 Device (Oxygen Therapy): room air    Temp  Min: 97.7 °F (36.5 °C)  Max: 98.8 °F (37.1 °C)  Pulse  Min: 68  Max: 80  BP  Min: 106/88  Max: 160/93  MAP (mmHg)  Min: 92  Max: 120  Resp  Min: 11  Max: 25  SpO2  Min: 96 %  Max: 100 %    04/04 0701 - 04/05 0700  In: 2970.4 [P.O.:550; I.V.:2420.4]  Out: 4085 [Urine:4085]   Unmeasured Output  Urine Occurrence: 1  Stool Occurrence: 0     Examination:   Constitutional: Well-nourished and -developed. No apparent distress.   Eyes: Conjunctiva clear, anicteric. Lids no lesions.  Head/Ears/Nose/Mouth/Throat/Neck: Moist mucous membranes. External ears, nose atraumatic.   Cardiovascular: Regular rhythm. No murmurs. No leg edema.  Respiratory: Comfortable respirations. Clear to auscultation.  Gastrointestinal: No hernia. Soft, nondistended, nontender. + bowel sounds.    Neurologic:  -GCS 15  -Alert. Oriented to person, place, and time. Speech fluent. Follows commands.  -Cranial nerves intact, except for slight L facial droop  --normal tone through out, 5/5 strength and sensation throughout      Medications:   Continuous   Scheduled    amLODIPine 10 mg Daily   folic acid 1 mg Daily   gabapentin 600 mg TID   heparin (porcine) 5,000 Units Q8H   levothyroxine 75 mcg Before breakfast   lisinopril 10 mg Daily   nicotine 1 patch Daily   phenytoin 200 mg BID   And     phenytoin 100 mg Daily   senna-docusate 8.6-50 mg 2 tablet Daily   sodium chloride 0.9% 3 mL Q8H   thiamine 100 mg Daily   PRN    acetaminophen 650 mg Q6H PRN   labetalol 10 mg Q4H PRN   magnesium oxide 800 mg PRN   magnesium oxide 800 mg PRN   ondansetron 8 mg Q8H PRN    oxyCODONE-acetaminophen 1 tablet Q4H PRN   potassium chloride 10% 40 mEq PRN   potassium chloride 10% 40 mEq PRN   potassium chloride 10% 60 mEq PRN   potassium, sodium phosphates 2 packet PRN   potassium, sodium phosphates 2 packet PRN   potassium, sodium phosphates 2 packet PRN   tiZANidine 4 mg Q12H PRN      Today I independently reviewed pertinent medications, lines/drains/airways, imaging, cardiology, lab results, notably: Ct head, sodium levels  Assessment/Plan:     Neuro   * Acute subdural hematoma    --CT head stable and exam unchanged  --goal sodium low 120s, endocrine now following.   --Monitor for signs of ODS  -Fluid restrict   --cont dilantin  --ttf        Psychiatric   Alcohol withdrawal syndrome with complication    · See seizures.        Renal/   Hyponatremia      -Heavy beer drinker, likely secondary to potomania  -- overcorrection on admission   -Follow sodiums q 4  --endocrine            Prophylaxis:  Venous Thromboembolism: mechanical chemical  Stress Ulcer: None  Ventilator Pneumonia: not applicable     Activity Orders          Up with assistance starting at 03/31 1345    Out of bed to chair up to 3x daily starting at 03/31 1300        Full Code    Yaneth Cortez PA-C  Neurocritical Care  [facility]

## 2018-04-06 NOTE — ASSESSMENT & PLAN NOTE
53F PMH presenting with R acute SDH.     Neurointact.   CT head stable  -- SBP <160.  -- HOB >30.  -- Pain control.   -- Neurochecks q4h,   Correct hyponatremia; trending na. Endocrine on board. Fluid restricting now; if doing well stop tomorrow. Possible d/c home tomorrow

## 2018-04-06 NOTE — PROGRESS NOTES
[facility]  Neurosurgery  Progress Note    Subjective:     History of Present Illness: Patient is a 53 year old female with a history of HTN and seizures, noncompliant on Dilantin who presents today with SDH. Reportedly not on anticoagulation. Patient hyponatremic to 110 on arrival.     Post-Op Info:  * No surgery found *         Interval History: SANDY DO stable    Medications:  Continuous Infusions:    Scheduled Meds:   amLODIPine  10 mg Oral Daily    folic acid  1 mg Oral Daily    gabapentin  600 mg Oral TID    heparin (porcine)  5,000 Units Subcutaneous Q8H    levothyroxine  75 mcg Oral Before breakfast    lisinopril  10 mg Oral Daily    nicotine  1 patch Transdermal Daily    phenytoin  200 mg Oral BID    And    phenytoin  100 mg Oral Daily    senna-docusate 8.6-50 mg  2 tablet Oral Daily    sodium chloride 0.9%  3 mL Intravenous Q8H    thiamine  100 mg Oral Daily     PRN Meds:acetaminophen, labetalol, magnesium oxide, magnesium oxide, ondansetron, oxyCODONE, potassium chloride 10%, potassium chloride 10%, potassium chloride 10%, potassium, sodium phosphates, potassium, sodium phosphates, potassium, sodium phosphates, tiZANidine     Review of Systems    Objective:     Weight: 64.7 kg (142 lb 10.2 oz)  Body mass index is 24.48 kg/m².  Vital Signs (Most Recent):  Temp: 98.6 °F (37 °C) (04/06/18 1115)  Pulse: 73 (04/06/18 1115)  Resp: 17 (04/06/18 1115)  BP: 95/69 (04/06/18 1115)  SpO2: 99 % (04/06/18 1115) Vital Signs (24h Range):  Temp:  [97.7 °F (36.5 °C)-98.6 °F (37 °C)] 98.6 °F (37 °C)  Pulse:  [68-82] 73  Resp:  [15-21] 17  SpO2:  [95 %-99 %] 99 %  BP: ()/(65-88) 95/69  Arterial Line BP: (133-289)/() 133/79       Date 04/06/18 0700 - 04/07/18 0659   Shift 9396-8844 6769-4368 1687-3104 24 Hour Total   I  N  T  A  K  E   P.O. 50   50    Shift Total  (mL/kg) 50  (0.8)   50  (0.8)   O  U  T  P  U  T   Urine  (mL/kg/hr) 1300   1300    Shift Total  (mL/kg) 1300  (20.1)    1300  (20.1)   Weight (kg) 64.7 64.7 64.7 64.7     Physical Exam:  Nursing note and vitals reviewed.    Constitutional: She appears well-developed and well-nourished.     Abdominal: Soft.     Psych/Behavior: She is alert. She is oriented to person, place, and time. She has a normal mood and affect.     Neurological:   PERRL, EOMI FS TM  GOODWIN 5/5  SILT  No drift         AAOx3, PERRL, EOMI, FS, TM, 5/5 throughout, SILT.    Significant Labs:    Recent Labs  Lab 04/04/18  1751  04/05/18  0258  04/06/18  0430 04/06/18  0805 04/06/18  1211   GLU  --   --  97  --  86  --   --    *  < > 125*  125*  < > 117*  117* 119* 122*   K 4.6  --  4.6  --  4.2  --   --    CL  --   --  97  --  88*  --   --    CO2  --   --  23  --  21*  --   --    BUN  --   --  6  --  5*  --   --    CREATININE  --   --  0.5  --  0.5  --   --    CALCIUM  --   --  8.0*  --  8.7  --   --    MG  --   --  1.9  --  1.5*  --   --    < > = values in this interval not displayed.    Recent Labs  Lab 04/05/18  0258 04/06/18  0430   WBC 5.14 4.77   HGB 8.8* 9.7*   HCT 24.9* 27.8*    198     No results for input(s): LABPT, INR, APTT in the last 48 hours.  Microbiology Results (last 7 days)     Procedure Component Value Units Date/Time    Culture, Respiratory with Gram Stain [[id number]] Collected:  04/04/18 1217    Order Status:  Completed Specimen:  Respiratory from Sputum Updated:  04/06/18 0717     Respiratory Culture Normal respiratory zoe     Gram Stain (Respiratory) <10 epithelial cells per low power field.     Gram Stain (Respiratory) Rare WBC's     Gram Stain (Respiratory) Rare Gram positive cocci    Blood culture [[id number]] Collected:  03/31/18 2112    Order Status:  Completed Specimen:  Blood from Peripheral, Antecubital, Left Updated:  04/06/18 0612     Blood Culture, Routine No growth after 5 days.    Narrative:       Blood cultures from 2 different sites. 4 bottles total.  Please draw before starting antibiotics.    Blood culture  [[id number]] Collected:  03/31/18 2250    Order Status:  Completed Specimen:  Blood from Peripheral, Antecubital, Right Updated:  04/06/18 0612     Blood Culture, Routine No growth after 5 days.    Narrative:       Blood cultures x 2 different sites. 4 bottles total. Please  draw cultures before administering antibiotics.    Blood culture [[id number]] Collected:  03/31/18 1533    Order Status:  Completed Specimen:  Blood from Line, Arterial, Right Updated:  04/05/18 1812     Blood Culture, Routine No growth after 5 days.    Blood Culture #2 **CANNOT BE ORDERED STAT** [[id number]] Collected:  03/31/18 1533    Order Status:  Completed Specimen:  Blood from Peripheral, Forearm, Right Updated:  04/05/18 1812     Blood Culture, Routine No growth after 5 days.        All pertinent labs from the last 24 hours have been reviewed.    Significant Diagnostics:  CT: Ct Head Without Contrast    Result Date: 4/3/2018  Stable subdural hemorrhage overlying the right cerebral hemisphere with mild mass effect and leftward midline shift.     Assessment/Plan:     * Acute subdural hematoma    53F PMH presenting with R acute SDH.     Neurointact.   CT head stable  -- SBP <160.  -- HOB >30.  -- Pain control.   -- Neurochecks q4h,   Correct hyponatremia; trending na. Endocrine on board. Fluid restricting now; if doing well stop tomorrow. Possible d/c home tomorrow              Zaid Dodd MD  Neurosurgery  [facility]

## 2018-04-06 NOTE — NURSING
Paged on call regarding NPO status of pt. Also regarding Q1 hour Neuro checks and hourly output. Awaiting a call back.

## 2018-04-07 ENCOUNTER — TELEPHONE (OUTPATIENT)
Dept: ENDOCRINOLOGY | Facility: HOSPITAL | Age: 53
End: 2018-04-07

## 2018-04-07 VITALS
HEART RATE: 80 BPM | BODY MASS INDEX: 24.35 KG/M2 | WEIGHT: 142.63 LBS | TEMPERATURE: 99 F | SYSTOLIC BLOOD PRESSURE: 114 MMHG | RESPIRATION RATE: 19 BRPM | OXYGEN SATURATION: 100 % | DIASTOLIC BLOOD PRESSURE: 77 MMHG | HEIGHT: 64 IN

## 2018-04-07 DIAGNOSIS — E87.1 HYPONATREMIA: Primary | ICD-10-CM

## 2018-04-07 PROBLEM — E03.9 HYPOTHYROIDISM: Status: ACTIVE | Noted: 2018-04-07

## 2018-04-07 LAB
ALBUMIN SERPL BCP-MCNC: 3 G/DL
ALP SERPL-CCNC: 62 U/L
ALT SERPL W/O P-5'-P-CCNC: 14 U/L
ANION GAP SERPL CALC-SCNC: 6 MMOL/L
AST SERPL-CCNC: 18 U/L
BASOPHILS # BLD AUTO: 0.03 K/UL
BASOPHILS NFR BLD: 0.5 %
BILIRUB SERPL-MCNC: 0.2 MG/DL
BUN SERPL-MCNC: 9 MG/DL
CALCIUM SERPL-MCNC: 8.9 MG/DL
CHLORIDE SERPL-SCNC: 96 MMOL/L
CO2 SERPL-SCNC: 23 MMOL/L
CREAT SERPL-MCNC: 0.6 MG/DL
DIFFERENTIAL METHOD: ABNORMAL
EOSINOPHIL # BLD AUTO: 0.1 K/UL
EOSINOPHIL NFR BLD: 2.4 %
ERYTHROCYTE [DISTWIDTH] IN BLOOD BY AUTOMATED COUNT: 12.9 %
EST. GFR  (AFRICAN AMERICAN): >60 ML/MIN/1.73 M^2
EST. GFR  (NON AFRICAN AMERICAN): >60 ML/MIN/1.73 M^2
GLUCOSE SERPL-MCNC: 101 MG/DL
HCT VFR BLD AUTO: 27.2 %
HGB BLD-MCNC: 9.3 G/DL
IMM GRANULOCYTES # BLD AUTO: 0.07 K/UL
IMM GRANULOCYTES NFR BLD AUTO: 1.2 %
LYMPHOCYTES # BLD AUTO: 1.8 K/UL
LYMPHOCYTES NFR BLD: 32 %
MAGNESIUM SERPL-MCNC: 1.9 MG/DL
MCH RBC QN AUTO: 29.7 PG
MCHC RBC AUTO-ENTMCNC: 34.2 G/DL
MCV RBC AUTO: 87 FL
MONOCYTES # BLD AUTO: 1 K/UL
MONOCYTES NFR BLD: 17.7 %
NEUTROPHILS # BLD AUTO: 2.6 K/UL
NEUTROPHILS NFR BLD: 46.2 %
NRBC BLD-RTO: 0 /100 WBC
OSMOLALITY UR: 151 MOSM/KG
OSMOLALITY UR: 85 MOSM/KG
OSMOLALITY UR: 89 MOSM/KG
PHOSPHATE SERPL-MCNC: 4.9 MG/DL
PLATELET # BLD AUTO: 240 K/UL
PMV BLD AUTO: 9.4 FL
POTASSIUM SERPL-SCNC: 4.2 MMOL/L
PROT SERPL-MCNC: 5.6 G/DL
RBC # BLD AUTO: 3.13 M/UL
SODIUM SERPL-SCNC: 125 MMOL/L
SODIUM SERPL-SCNC: 129 MMOL/L
SODIUM UR-SCNC: <20 MMOL/L
WBC # BLD AUTO: 5.72 K/UL

## 2018-04-07 PROCEDURE — 84295 ASSAY OF SERUM SODIUM: CPT | Mod: 91

## 2018-04-07 PROCEDURE — 25000003 PHARM REV CODE 250: Performed by: STUDENT IN AN ORGANIZED HEALTH CARE EDUCATION/TRAINING PROGRAM

## 2018-04-07 PROCEDURE — 63600175 PHARM REV CODE 636 W HCPCS: Performed by: PHYSICIAN ASSISTANT

## 2018-04-07 PROCEDURE — 83935 ASSAY OF URINE OSMOLALITY: CPT

## 2018-04-07 PROCEDURE — 83735 ASSAY OF MAGNESIUM: CPT

## 2018-04-07 PROCEDURE — A4216 STERILE WATER/SALINE, 10 ML: HCPCS | Performed by: STUDENT IN AN ORGANIZED HEALTH CARE EDUCATION/TRAINING PROGRAM

## 2018-04-07 PROCEDURE — 84295 ASSAY OF SERUM SODIUM: CPT

## 2018-04-07 PROCEDURE — 80053 COMPREHEN METABOLIC PANEL: CPT

## 2018-04-07 PROCEDURE — 85025 COMPLETE CBC W/AUTO DIFF WBC: CPT

## 2018-04-07 PROCEDURE — 25000003 PHARM REV CODE 250: Performed by: PHYSICIAN ASSISTANT

## 2018-04-07 PROCEDURE — 84300 ASSAY OF URINE SODIUM: CPT

## 2018-04-07 PROCEDURE — S4991 NICOTINE PATCH NONLEGEND: HCPCS | Performed by: ANESTHESIOLOGY

## 2018-04-07 PROCEDURE — 25000003 PHARM REV CODE 250: Performed by: NURSE PRACTITIONER

## 2018-04-07 PROCEDURE — 99233 SBSQ HOSP IP/OBS HIGH 50: CPT | Mod: ,,, | Performed by: PHYSICIAN ASSISTANT

## 2018-04-07 PROCEDURE — 84300 ASSAY OF URINE SODIUM: CPT | Mod: 91

## 2018-04-07 PROCEDURE — 83935 ASSAY OF URINE OSMOLALITY: CPT | Mod: 91

## 2018-04-07 PROCEDURE — 84100 ASSAY OF PHOSPHORUS: CPT

## 2018-04-07 PROCEDURE — 25000003 PHARM REV CODE 250: Performed by: PSYCHIATRY & NEUROLOGY

## 2018-04-07 PROCEDURE — 36415 COLL VENOUS BLD VENIPUNCTURE: CPT

## 2018-04-07 PROCEDURE — 25000003 PHARM REV CODE 250: Performed by: ANESTHESIOLOGY

## 2018-04-07 PROCEDURE — 99232 SBSQ HOSP IP/OBS MODERATE 35: CPT | Mod: ,,, | Performed by: INTERNAL MEDICINE

## 2018-04-07 RX ORDER — LANOLIN ALCOHOL/MO/W.PET/CERES
100 CREAM (GRAM) TOPICAL DAILY
COMMUNITY
Start: 2018-04-07

## 2018-04-07 RX ORDER — ACETAMINOPHEN 325 MG/1
650 TABLET ORAL EVERY 6 HOURS PRN
Refills: 0 | COMMUNITY
Start: 2018-04-07

## 2018-04-07 RX ORDER — IBUPROFEN 200 MG
1 TABLET ORAL DAILY
Refills: 0 | COMMUNITY
Start: 2018-04-07

## 2018-04-07 RX ORDER — FOLIC ACID 1 MG/1
1 TABLET ORAL DAILY
Refills: 0 | COMMUNITY
Start: 2018-04-07 | End: 2019-04-07

## 2018-04-07 RX ORDER — OXYCODONE AND ACETAMINOPHEN 10; 325 MG/1; MG/1
1 TABLET ORAL
Qty: 75 TABLET | Refills: 0 | Status: ON HOLD | OUTPATIENT
Start: 2018-04-07 | End: 2018-05-03 | Stop reason: HOSPADM

## 2018-04-07 RX ADMIN — HEPARIN SODIUM 5000 UNITS: 5000 INJECTION, SOLUTION INTRAVENOUS; SUBCUTANEOUS at 05:04

## 2018-04-07 RX ADMIN — ACETAMINOPHEN 650 MG: 325 TABLET, FILM COATED ORAL at 08:04

## 2018-04-07 RX ADMIN — Medication 3 ML: at 05:04

## 2018-04-07 RX ADMIN — FOLIC ACID 1 MG: 1 TABLET ORAL at 08:04

## 2018-04-07 RX ADMIN — NICOTINE 1 PATCH: 21 PATCH, EXTENDED RELEASE TRANSDERMAL at 08:04

## 2018-04-07 RX ADMIN — STANDARDIZED SENNA CONCENTRATE AND DOCUSATE SODIUM 2 TABLET: 8.6; 5 TABLET, FILM COATED ORAL at 08:04

## 2018-04-07 RX ADMIN — LEVOTHYROXINE SODIUM 75 MCG: 75 TABLET ORAL at 05:04

## 2018-04-07 RX ADMIN — Medication 100 MG: at 09:04

## 2018-04-07 RX ADMIN — PHENYTOIN SODIUM 200 MG: 100 CAPSULE ORAL at 08:04

## 2018-04-07 RX ADMIN — LISINOPRIL 10 MG: 10 TABLET ORAL at 08:04

## 2018-04-07 RX ADMIN — OXYCODONE HYDROCHLORIDE 10 MG: 5 TABLET ORAL at 05:04

## 2018-04-07 RX ADMIN — GABAPENTIN 600 MG: 100 CAPSULE ORAL at 08:04

## 2018-04-07 RX ADMIN — AMLODIPINE BESYLATE 10 MG: 10 TABLET ORAL at 08:04

## 2018-04-07 RX ADMIN — OXYCODONE HYDROCHLORIDE 10 MG: 5 TABLET ORAL at 09:04

## 2018-04-07 NOTE — ASSESSMENT & PLAN NOTE
Chronic hyponatremia.     Na improved by 8-10 meq over the course of 24hrs, this is appropriate.   She should drink to thirst.   No fluid restriction necessary at this point.     She does need follow up with endocrinology or nephrology and she wishes to do this in [place].     Repeat renal panel in 5-7 days post discharge, and f/u with PCP in 2 weeks

## 2018-04-07 NOTE — DISCHARGE SUMMARY
[facility]  Neurosurgery  Discharge Summary      Patient Name: Caridad Ortiz  MRN: [id number]  Admission Date: 3/31/2018  Hospital Length of Stay: 7 days  Discharge Date and Time:  04/07/2018 10:15 AM  Attending Physician: Negro Morataya MD   Discharging Provider: Gabriele Ely PA-C  Primary Care Provider: Primary Doctor No    HPI:   Patient is a 53 year old female with a history of chronic hyponatremia (over 10 years), HTN, smoking, ETOH use, and seizures, noncompliant on Dilantin who presents today with SDH. Reportedly not on anticoagulation. Patient hyponatremic to 110 on arrival.     * No surgery found *     Hospital Course:  4/2: monitor Na closely, stable exam  4/5: CT head stable  4/6: endocrinology consulted for hyponatremia  4/7: NAEON. No complaints on exam. Sodium improved. Endocrinology cleared for discharge and recommended outpt follow up in [place]. Patient will follow up with neurosurgery in 2 weeks with repeat CT head.     Consults:   Consults         Status Ordering Provider     Inpatient consult to Endocrinology  Once     Provider:  (Not yet assigned)    Completed GEOVANNY MARTINEZ     Inpatient consult to Neurosurgery  Once     Provider:  (Not yet assigned)    Acknowledged ARIS CORONADO     Inpatient consult to Physical Medicine Rehab  Once     Provider:  (Not yet assigned)    Completed ROB MORALES II     Inpatient consult to Registered Dietitian/Nutritionist  Once     Provider:  (Not yet assigned)    Completed ROB MORALES II     IP consult to case management/social work  Once     Provider:  (Not yet assigned)    Acknowledged ROB MORALES II              Pending Diagnostic Studies:     Procedure Component Value Units Date/Time    Osmolality, urine [[id number]] Collected:  04/07/18 0906    Order Status:  Sent Lab Status:  In process Updated:  04/07/18 1012    Specimen:  Urine from Urine, Clean Catch     Sodium [[id number]]     Order Status:  Sent Lab Status:  No  result     Specimen:  Blood from Blood     Sodium [[id number]]     Order Status:  Sent Lab Status:  No result     Specimen:  Blood from Blood     Sodium [[id number]] Collected:  04/03/18 0030    Order Status:  Sent Lab Status:  In process Updated:  04/03/18 0032    Specimen:  Blood from Blood     Sodium, urine, random [[id number]] Collected:  04/07/18 0906    Order Status:  Sent Lab Status:  In process Updated:  04/07/18 1012    Specimen:  Urine from Urine, Clean Catch     X-Ray Chest AP Portable [[id number]]     Order Status:  Sent Lab Status:  No result         Final Active Diagnoses:    Diagnosis Date Noted POA    PRINCIPAL PROBLEM:  Acute subdural hematoma [I62.01] 03/31/2018 Yes    Hypothyroidism [E03.9] 04/07/2018 Yes    HTN (hypertension) [I10]  Unknown    Intracranial hemorrhage [I62.9]  Unknown    Hyponatremia [E87.1] 03/31/2018 Yes    Alcohol withdrawal syndrome with complication [F10.239] 03/31/2018 Yes    Seizure after head injury [R56.1] 03/31/2018 Yes    Brain compression [G93.5] 03/31/2018 Yes    Agitation requiring sedation protocol [R45.1] 03/31/2018 Yes    Alcohol abuse [F10.10] 10/26/2013 Yes      Problems Resolved During this Admission:    Diagnosis Date Noted Date Resolved POA      Discharged Condition: good    Disposition: home with outpt PT    Follow Up:  Follow-up Information     Endocrinology in [place].    Why:  To establish care for hyponatremia           Primary Doctor No In 1 week.    Why:  for repeat renal panel            [facility] - Neurosurgery [facility] In 2 weeks.    Specialty:  Neurosurgery  Why:  hospital follow up, CT head prior to appointment  Contact information:  [address]  [facility] [address] [phone]  Additional information:  7th Floor               Patient Instructions:     CT Head Without Contrast   Standing Status: Future  Standing Exp. Date: 04/07/19   Order Specific Question Answer Comments   May the Radiologist modify the order per  protocol to meet the clinical needs of the patient? Yes      Ambulatory Referral to Endocrinology   Referral Priority: Routine Referral Type: Consultation   Requested Specialty: Endocrinology    Number of Visits Requested: 1      Ambulatory consult to Physical Therapy   Referral Priority: Routine Referral Type: Physical Medicine   Referral Reason: Specialty Services Required    Requested Specialty: Physical Therapy    Number of Visits Requested: 1        Medications:  Reconciled Home Medications:      Medication List      START taking these medications    acetaminophen 325 MG tablet  Commonly known as:  TYLENOL  Take 2 tablets (650 mg total) by mouth every 6 (six) hours as needed.     folic acid 1 MG tablet  Commonly known as:  FOLVITE  Take 1 tablet (1 mg total) by mouth once daily.     nicotine 21 mg/24 hr  Commonly known as:  NICODERM CQ  Place 1 patch onto the skin once daily.     oxyCODONE-acetaminophen  mg per tablet  Commonly known as:  PERCOCET  Take 1 tablet by mouth every 4 to 6 hours as needed for Pain.     thiamine 100 MG tablet  Take 1 tablet (100 mg total) by mouth once daily.        CONTINUE taking these medications    amLODIPine 10 MG tablet  Commonly known as:  NORVASC     busPIRone 15 MG tablet  Commonly known as:  BUSPAR     calcium-vitamin D3 500 mg(1,250mg) -200 unit per tablet     citalopram 20 MG tablet  Commonly known as:  CELEXA  Take 1 tablet (20 mg total) by mouth once daily.     levothyroxine 75 MCG tablet  Commonly known as:  SYNTHROID     lisinopril 10 MG tablet  Take 1 tablet (10 mg total) by mouth once daily.     metoprolol tartrate 25 MG tablet  Commonly known as:  LOPRESSOR     mirtazapine 30 MG tablet  Commonly known as:  REMERON  Take 1 tablet (30 mg total) by mouth every evening.     phenytoin 100 MG ER capsule  Commonly known as:  DILANTIN     tiZANidine 4 MG tablet  Commonly known as:  ZANAFLEX        STOP taking these medications    gabapentin 800 MG tablet  Commonly  known as:  NEURONTIN     liothyronine 25 MCG Tab  Commonly known as:  CYTOMEL        ASK your doctor about these medications    gabapentin 300 MG capsule  Commonly known as:  NEURONTIN  Take 2 capsules (600 mg total) by mouth 3 (three) times daily.  Ask about: Should I take this medication?           Where to Get Your Medications      You can get these medications from any pharmacy    Bring a paper prescription for each of these medications  · oxyCODONE-acetaminophen  mg per tablet  You don't need a prescription for these medications  · acetaminophen 325 MG tablet  · folic acid 1 MG tablet  · nicotine 21 mg/24 hr  · thiamine 100 MG tablet         Gabriele Ely PA-C  Neurosurgery  [facility]

## 2018-04-07 NOTE — TELEPHONE ENCOUNTER
Please schedule BMP and UA on 4/9 (two days post discharge)     BMP and UA order as external via [facility].   If pt does not have access to [facility], please mail her paper order sheets to get down at lab of her choice.     Please remind pt to follow up with PCP in 1-2 weeks and to make appt with either renal or endo in [place] for hyponatremia evaluation and management

## 2018-04-07 NOTE — SUBJECTIVE & OBJECTIVE
Interval History: NAEON. No complaints on exam. Sodium improved. Endocrinology cleared for discharge and recommended outpt follow up in [place]. Plan to DC patient home today.     Medications:  Continuous Infusions:  Scheduled Meds:   amLODIPine  10 mg Oral Daily    folic acid  1 mg Oral Daily    gabapentin  600 mg Oral TID    heparin (porcine)  5,000 Units Subcutaneous Q8H    levothyroxine  75 mcg Oral Before breakfast    lisinopril  10 mg Oral Daily    nicotine  1 patch Transdermal Daily    phenytoin  200 mg Oral BID    And    phenytoin  100 mg Oral Daily    senna-docusate 8.6-50 mg  2 tablet Oral Daily    sodium chloride 0.9%  3 mL Intravenous Q8H    thiamine  100 mg Oral Daily     PRN Meds:acetaminophen, labetalol, magnesium oxide, magnesium oxide, ondansetron, oxyCODONE, potassium chloride 10%, potassium chloride 10%, potassium chloride 10%, potassium, sodium phosphates, potassium, sodium phosphates, potassium, sodium phosphates, tiZANidine     Review of Systems   Constitutional: no fever, chills or night sweats. No changes in weight   Eyes: no visual changes   ENT: no nasal congestion or sore throat   Respiratory: no cough or shortness of breath   Cardiovascular: no chest pain or palpitations   Gastrointestinal: no nausea or vomiting   Genitourinary: no hematuria or dysuria   Integument/Breast: no rash or pruritis   Hematologic/Lymphatic: no easy bruising or lymphadenopathy   Musculoskeletal: no arthralgias or myalgias.  Neurological: no seizures or tremors. No weakness or paresthesia.   Behavioral/Psych: no auditory or visual hallucinations   Endocrine: no heat or cold intolerance     Objective:     Weight: 64.7 kg (142 lb 10.2 oz)  Body mass index is 24.48 kg/m².  Vital Signs (Most Recent):  Temp: 99 °F (37.2 °C) (04/07/18 0739)  Pulse: 80 (04/07/18 0739)  Resp: 19 (04/07/18 0739)  BP: 114/77 (04/07/18 0739)  SpO2: 100 % (04/07/18 0739) Vital Signs (24h Range):  Temp:  [98.5 °F (36.9 °C)-99.7 °F (37.6  °C)] 99 °F (37.2 °C)  Pulse:  [] 80  Resp:  [14-19] 19  SpO2:  [95 %-100 %] 100 %  BP: ()/(58-77) 114/77                           Neurosurgery Physical Exam  General: well developed, appears older than stated age. no acute distress.  Neurologic: Awake, alert and oriented x3. Thought content appropriate.  Head: normocephalic, atraumatic   GCS: Motor: 6/Verbal: 5/Eyes: 4 GCS Total: 15  Cranial nerves: face symmetric, tongue midline, pupils equal, round, reactive to light with accomodation, extraocular muscles intact. CN II-XII grossly intact.   Language: no aphasia  Speech: no dysarthria   Sensory: response to light touch throughout  Motor Strength: Moves all extremities spontaneously with good tone. Full strength upper and lower extremities. No abnormal movements seen.           Pronator Drift: no drift noted  Coordination: finger to nose normal bilaterally  Gait: Normal  No focal numbness or weakness  ENT: normal hearing with finger rub  Heart: RRR, no cyanosis, pallor, or edema.   Lungs:  normal respiratory effort  Abdomen: soft, non-tender and symmetric  Extremities: warm with no cyanosis, edema, or clubbing  Pulses: palpable distal pulses  Skin: warm, dry and intact. No visible rashes or lesions. Bruising throughout BUE        Significant Labs:    Recent Labs  Lab 04/06/18 0430 04/07/18  0010 04/07/18 0444 04/07/18  0822   GLU 86  --   --  101  --    *  117*  < > 125* 125*  125* 129*   K 4.2  --   --  4.2  --    CL 88*  --   --  96  --    CO2 21*  --   --  23  --    BUN 5*  --   --  9  --    CREATININE 0.5  --   --  0.6  --    CALCIUM 8.7  --   --  8.9  --    MG 1.5*  --   --  1.9  --    < > = values in this interval not displayed.    Recent Labs  Lab 04/06/18 0430 04/07/18 0444   WBC 4.77 5.72   HGB 9.7* 9.3*   HCT 27.8* 27.2*    240     No results for input(s): LABPT, INR, APTT in the last 48 hours.  Microbiology Results (last 7 days)     Procedure Component Value Units  Date/Time    Culture, Respiratory with Gram Stain [[id number]] Collected:  04/04/18 1217    Order Status:  Completed Specimen:  Respiratory from Sputum Updated:  04/06/18 0717     Respiratory Culture Normal respiratory zoe     Gram Stain (Respiratory) <10 epithelial cells per low power field.     Gram Stain (Respiratory) Rare WBC's     Gram Stain (Respiratory) Rare Gram positive cocci    Blood culture [[id number]] Collected:  03/31/18 2112    Order Status:  Completed Specimen:  Blood from Peripheral, Antecubital, Left Updated:  04/06/18 0612     Blood Culture, Routine No growth after 5 days.    Narrative:       Blood cultures from 2 different sites. 4 bottles total.  Please draw before starting antibiotics.    Blood culture [[id number]] Collected:  03/31/18 2250    Order Status:  Completed Specimen:  Blood from Peripheral, Antecubital, Right Updated:  04/06/18 0612     Blood Culture, Routine No growth after 5 days.    Narrative:       Blood cultures x 2 different sites. 4 bottles total. Please  draw cultures before administering antibiotics.    Blood culture [[id number]] Collected:  03/31/18 1533    Order Status:  Completed Specimen:  Blood from Line, Arterial, Right Updated:  04/05/18 1812     Blood Culture, Routine No growth after 5 days.    Blood Culture #2 **CANNOT BE ORDERED STAT** [[id number]] Collected:  03/31/18 1533    Order Status:  Completed Specimen:  Blood from Peripheral, Forearm, Right Updated:  04/05/18 1812     Blood Culture, Routine No growth after 5 days.            Significant Diagnostics:  None new

## 2018-04-07 NOTE — SUBJECTIVE & OBJECTIVE
"Interval HPI:   Overnight events:  Reports HA unchanged. meds help with HA.   No lightheadedness.   Normal mentation.   Feels thirsty. Drinking to thirst   UOP 4L in 24hrs.   Na 125    /64 (BP Location: Left arm, Patient Position: Lying)   Pulse 85   Temp 99.7 °F (37.6 °C) (Oral)   Resp 15   Ht 5' 4" (1.626 m)   Wt 64.7 kg (142 lb 10.2 oz)   SpO2 95%   Breastfeeding? No   BMI 24.48 kg/m²     Labs Reviewed and Include      Recent Labs  Lab 04/07/18  0444      CALCIUM 8.9   ALBUMIN 3.0*   PROT 5.6*   *  125*   K 4.2   CO2 23   CL 96   BUN 9   CREATININE 0.6   ALKPHOS 62   ALT 14   AST 18   BILITOT 0.2     Lab Results   Component Value Date    WBC 5.72 04/07/2018    HGB 9.3 (L) 04/07/2018    HCT 27.2 (L) 04/07/2018    MCV 87 04/07/2018     04/07/2018       Recent Labs  Lab 03/31/18  1046   TSH 1.688     Lab Results   Component Value Date    HGBA1C 4.7 04/01/2018       Nutritional status:   Body mass index is 24.48 kg/m².  Lab Results   Component Value Date    ALBUMIN 3.0 (L) 04/07/2018    ALBUMIN 2.9 (L) 04/06/2018    ALBUMIN 2.7 (L) 04/05/2018     No results found for: PREALBUMIN    Estimated Creatinine Clearance: 91.5 mL/min (based on SCr of 0.6 mg/dL).    Accu-Checks  Recent Labs      04/04/18   1120  04/04/18   1747   POCTGLUCOSE  92  117*       Current Medications and/or Treatments Impacting Glycemic Control  Immunotherapy:  Immunosuppressants     None        Steroids:   Hormones     None        Pressors:    Autonomic Drugs     None        Hyperglycemia/Diabetes Medications: Antihyperglycemics     None        "

## 2018-04-07 NOTE — ASSESSMENT & PLAN NOTE
53F PMH presenting with R acute SDH.     -Patient neurologically intact on exam.   -Endocrinology consulted for hyponatremia. Sodium improved. Endocrinology cleared for discharge and recommended outpt follow up in [place]. Repeat renal panel in 5-7 days post discharge, and follow up with primary physician in 2 weeks for this.  -PATIENT SHOULD DRINK TO THIRST.  -Follow up with Neurosurgery in 2 weeks with CT head prior. Appts will be mailed. Any worsening symptoms report to ED.

## 2018-04-07 NOTE — DISCHARGE INSTRUCTIONS
-Follow up with endocrinology or nephrology in [place].   -PATIENT SHOULD DRINK TO THIRST.  -Repeat renal panel in 5-7 days post discharge, and follow up with primary physician in 2 weeks for this.  -Resume LT4 75mcg daily upon discharge   -Follow up with Neurosurgery in 2 weeks with CT head prior. Appts will be mailed. Any worsening symptoms report to ED.

## 2018-04-07 NOTE — PROGRESS NOTES
[facility]  Endocrinology  Progress Note    Admit Date: 3/31/2018     Caridad Ortiz is a 53 y.o. female with PHMx EtOH abuse, depression, hypothyroidism who presents to [facility] on 03/31 as transfer from [facility] in [facility] for witnessed seizure, transferred to [facility] where workup revealed an SDH. She was admitted under the service of Neurology Critical Care. On admission was noted to be hyponatremic (Na 108-111.) Was overcorrected, started on D5 & desmopressin. Since then, patient's Na fluctuating, and endocrinology was consulted for recommendations.    Patient first ran into issues with hyponatremia 10 yrs ago when she was found in a coma at home by her  and admitted to [facility]. She reports that she has been hyponatremic since then but uncertain of baseline and has not run into further issues. She notes that she drinks significant amount of fluids at home, mainly diet coke. Associated with minimal food intake due to weight consciousness. Reports constant thirst and urination; no recent change in these symptoms. No recent fever, chills, n/v, abdominal pain. No chest pain, palpitations, dyspnea, lower extremity swelling. No change in sensation, focal motor weakness, generalized weakness, fatigue, or confusion. ROS positive for frontal headache that has been present the last few days; however, today it is more intense.     Pmhx reported as EtOH abuse although patient denies this. Also has hypothyroidism, well-controlled on synthroid. On buspar for depression. Patient lives in a home with multiple roommates, she is independent with ADLs. Current smoker 40pack yr, current EtOH use (~1beer/day), no recreational drug use.     Hospital course:  03/31-Na 108, started on NS gtt 03/31-04/01. Low serum osm 230.  04/01-Na 124, NS gtt stopped, started on D5  04/02-Na peaked 130, started desmopressin, continue D5  04/03-Na 112, stopped D5 &  "DDVP, restart NS gtt. Concentrated urine (urine Na 236, urine osm 625.)  04/04- Na increase to 124, urine dilute. Con't NS gtt, DDVP restarted.  04/05-Na stable at 125 --> 121, NS gtt stopped. Endocrinology consulted.         Interval HPI:   Overnight events:  Reports HA unchanged. meds help with HA.   No lightheadedness.   Normal mentation.   Feels thirsty. Drinking to thirst   UOP 4L in 24hrs.   Na 125    /64 (BP Location: Left arm, Patient Position: Lying)   Pulse 85   Temp 99.7 °F (37.6 °C) (Oral)   Resp 15   Ht 5' 4" (1.626 m)   Wt 64.7 kg (142 lb 10.2 oz)   SpO2 95%   Breastfeeding? No   BMI 24.48 kg/m²       Labs Reviewed and Include      Recent Labs  Lab 04/07/18  0444      CALCIUM 8.9   ALBUMIN 3.0*   PROT 5.6*   *  125*   K 4.2   CO2 23   CL 96   BUN 9   CREATININE 0.6   ALKPHOS 62   ALT 14   AST 18   BILITOT 0.2     Lab Results   Component Value Date    WBC 5.72 04/07/2018    HGB 9.3 (L) 04/07/2018    HCT 27.2 (L) 04/07/2018    MCV 87 04/07/2018     04/07/2018       Recent Labs  Lab 03/31/18  1046   TSH 1.688     Lab Results   Component Value Date    HGBA1C 4.7 04/01/2018       Nutritional status:   Body mass index is 24.48 kg/m².  Lab Results   Component Value Date    ALBUMIN 3.0 (L) 04/07/2018    ALBUMIN 2.9 (L) 04/06/2018    ALBUMIN 2.7 (L) 04/05/2018     No results found for: PREALBUMIN    Estimated Creatinine Clearance: 91.5 mL/min (based on SCr of 0.6 mg/dL).    Accu-Checks  Recent Labs      04/04/18   1120  04/04/18   1747   POCTGLUCOSE  92  117*       Current Medications and/or Treatments Impacting Glycemic Control  Immunotherapy:  Immunosuppressants     None        Steroids:   Hormones     None        Pressors:    Autonomic Drugs     None        Hyperglycemia/Diabetes Medications: Antihyperglycemics     None          ASSESSMENT and PLAN    * Acute subdural hematoma    Per neurosurgery        Hyponatremia    Chronic hyponatremia.     Na improved by 8-10 meq over " the course of 24hrs, this is appropriate.   She should drink to thirst.   No fluid restriction necessary at this point.     She does need follow up with endocrinology or nephrology and she wishes to do this in [place].     Repeat renal panel in 5-7 days post discharge, and f/u with PCP in 2 weeks         Hypothyroidism    Resume LT4 75mcg daily upon discharge               Modesta Brannon MD  Endocrinology  [facility]

## 2018-04-07 NOTE — PROGRESS NOTES
[facility] [address] [facility]  Neurosurgery  Progress Note    Subjective:     History of Present Illness: Patient is a 53 year old female with a history of chronic hyponatremia (over 10 years), HTN, smoking, ETOH use, and seizures, noncompliant on Dilantin who presents today with SDH. Reportedly not on anticoagulation. Patient hyponatremic to 110 on arrival.     Post-Op Info:  * No surgery found *         Interval History: NAEON. No complaints on exam. Sodium improved. Endocrinology cleared for discharge and recommended outpt follow up in [place]. Plan to DC patient home today.     Medications:  Continuous Infusions:  Scheduled Meds:   amLODIPine  10 mg Oral Daily    folic acid  1 mg Oral Daily    gabapentin  600 mg Oral TID    heparin (porcine)  5,000 Units Subcutaneous Q8H    levothyroxine  75 mcg Oral Before breakfast    lisinopril  10 mg Oral Daily    nicotine  1 patch Transdermal Daily    phenytoin  200 mg Oral BID    And    phenytoin  100 mg Oral Daily    senna-docusate 8.6-50 mg  2 tablet Oral Daily    sodium chloride 0.9%  3 mL Intravenous Q8H    thiamine  100 mg Oral Daily     PRN Meds:acetaminophen, labetalol, magnesium oxide, magnesium oxide, ondansetron, oxyCODONE, potassium chloride 10%, potassium chloride 10%, potassium chloride 10%, potassium, sodium phosphates, potassium, sodium phosphates, potassium, sodium phosphates, tiZANidine     Review of Systems   Constitutional: no fever, chills or night sweats. No changes in weight   Eyes: no visual changes   ENT: no nasal congestion or sore throat   Respiratory: no cough or shortness of breath   Cardiovascular: no chest pain or palpitations   Gastrointestinal: no nausea or vomiting   Genitourinary: no hematuria or dysuria   Integument/Breast: no rash or pruritis   Hematologic/Lymphatic: no easy bruising or lymphadenopathy   Musculoskeletal: no arthralgias or myalgias.  Neurological: no seizures or tremors. No weakness or paresthesia.    Behavioral/Psych: no auditory or visual hallucinations   Endocrine: no heat or cold intolerance     Objective:     Weight: 64.7 kg (142 lb 10.2 oz)  Body mass index is 24.48 kg/m².  Vital Signs (Most Recent):  Temp: 99 °F (37.2 °C) (04/07/18 0739)  Pulse: 80 (04/07/18 0739)  Resp: 19 (04/07/18 0739)  BP: 114/77 (04/07/18 0739)  SpO2: 100 % (04/07/18 0739) Vital Signs (24h Range):  Temp:  [98.5 °F (36.9 °C)-99.7 °F (37.6 °C)] 99 °F (37.2 °C)  Pulse:  [] 80  Resp:  [14-19] 19  SpO2:  [95 %-100 %] 100 %  BP: ()/(58-77) 114/77                           Neurosurgery Physical Exam  General: well developed, appears older than stated age. no acute distress.  Neurologic: Awake, alert and oriented x3. Thought content appropriate.  Head: normocephalic, atraumatic   GCS: Motor: 6/Verbal: 5/Eyes: 4 GCS Total: 15  Cranial nerves: face symmetric, tongue midline, pupils equal, round, reactive to light with accomodation, extraocular muscles intact. CN II-XII grossly intact.   Language: no aphasia  Speech: no dysarthria   Sensory: response to light touch throughout  Motor Strength: Moves all extremities spontaneously with good tone. Full strength upper and lower extremities. No abnormal movements seen.           Pronator Drift: no drift noted  Coordination: finger to nose normal bilaterally  Gait: Normal  No focal numbness or weakness  ENT: normal hearing with finger rub  Heart: RRR, no cyanosis, pallor, or edema.   Lungs:  normal respiratory effort  Abdomen: soft, non-tender and symmetric  Extremities: warm with no cyanosis, edema, or clubbing  Pulses: palpable distal pulses  Skin: warm, dry and intact. No visible rashes or lesions. Bruising throughout BUE        Significant Labs:    Recent Labs  Lab 04/06/18  0430  04/07/18  0010 04/07/18  0444 04/07/18  0822   GLU 86  --   --  101  --    *  117*  < > 125* 125*  125* 129*   K 4.2  --   --  4.2  --    CL 88*  --   --  96  --    CO2 21*  --   --  23  --    BUN  5*  --   --  9  --    CREATININE 0.5  --   --  0.6  --    CALCIUM 8.7  --   --  8.9  --    MG 1.5*  --   --  1.9  --    < > = values in this interval not displayed.    Recent Labs  Lab 04/06/18  0430 04/07/18  0444   WBC 4.77 5.72   HGB 9.7* 9.3*   HCT 27.8* 27.2*    240     No results for input(s): LABPT, INR, APTT in the last 48 hours.  Microbiology Results (last 7 days)     Procedure Component Value Units Date/Time    Culture, Respiratory with Gram Stain [[id number]] Collected:  04/04/18 1217    Order Status:  Completed Specimen:  Respiratory from Sputum Updated:  04/06/18 0717     Respiratory Culture Normal respiratory zoe     Gram Stain (Respiratory) <10 epithelial cells per low power field.     Gram Stain (Respiratory) Rare WBC's     Gram Stain (Respiratory) Rare Gram positive cocci    Blood culture [[id number]] Collected:  03/31/18 2112    Order Status:  Completed Specimen:  Blood from Peripheral, Antecubital, Left Updated:  04/06/18 0612     Blood Culture, Routine No growth after 5 days.    Narrative:       Blood cultures from 2 different sites. 4 bottles total.  Please draw before starting antibiotics.    Blood culture [[id number]] Collected:  03/31/18 2250    Order Status:  Completed Specimen:  Blood from Peripheral, Antecubital, Right Updated:  04/06/18 0612     Blood Culture, Routine No growth after 5 days.    Narrative:       Blood cultures x 2 different sites. 4 bottles total. Please  draw cultures before administering antibiotics.    Blood culture [[id number]] Collected:  03/31/18 1533    Order Status:  Completed Specimen:  Blood from Line, Arterial, Right Updated:  04/05/18 1812     Blood Culture, Routine No growth after 5 days.    Blood Culture #2 **CANNOT BE ORDERED STAT** [[id number]] Collected:  03/31/18 1533    Order Status:  Completed Specimen:  Blood from Peripheral, Forearm, Right Updated:  04/05/18 1812     Blood Culture, Routine No growth after 5 days.            Significant  Diagnostics:  None new    Assessment/Plan:     * Acute subdural hematoma    53F PMH presenting with R acute SDH.     -Patient neurologically intact on exam.   -Endocrinology consulted for hyponatremia. Sodium improved. Endocrinology cleared for discharge and recommended outpt follow up in [place]. Repeat renal panel in 5-7 days post discharge, and follow up with primary physician in 2 weeks for this.  -PATIENT SHOULD DRINK TO THIRST.  -Follow up with Neurosurgery in 2 weeks with CT head prior. Appts will be mailed. Any worsening symptoms report to ED.         Hypothyroidism    Resume home levothyroxine        Seizure after head injury    Resume home dilantin        Alcohol withdrawal syndrome with complication    Dc on OTC thiamine and folic acid daily.        Hyponatremia    See above        Alcohol abuse    See other.             STEF CollinsC  Neurosurgery  [facility]

## 2018-04-07 NOTE — NURSING
Discharge instructions given to and reviewed with patient. Patient verbalized understanding of discharge instructions. Patient given written prescription. Patients IV removed free of complications. Patient awaiting transportation. Will continue to monitor.

## 2018-04-07 NOTE — PLAN OF CARE
Problem: Patient Care Overview  Goal: Plan of Care Review  Outcome: Ongoing (interventions implemented as appropriate)  Patient free free from falls or injury. All care explained. Questions addressed. Tele intact. Patient medicated for pain control as per PRN order. Bed in low and locked position. Call light within reach.

## 2018-04-09 ENCOUNTER — TELEPHONE (OUTPATIENT)
Dept: NEUROSURGERY | Facility: CLINIC | Age: 53
End: 2018-04-09

## 2018-04-09 LAB — PHOSPHATIDYLETHANOL (PETH): 356 NG/ML

## 2018-04-09 NOTE — TELEPHONE ENCOUNTER
----- Message from Lilibeth Yeh sent at 4/9/2018  4:12 PM CDT -----  Contact: Self [phone]  Pt is requesting a call back from the nurse to see about getting a release to return to work. Patient was recently in the hospital and the doctor was listed as her attending provider.    She may be reached at [phone] and at that time she can supply a work fax number where it can be sent to.    Thank you.  LC

## 2018-04-09 NOTE — PLAN OF CARE
Patient discharged home. The patient does not have any home needs. Family to provide transportation home. Neurosurgery clinic to schedule follow up appointment. The patient has ambulatory referral to endocrinology and physical therapy.       04/09/18 0911   Final Note   Assessment Type Final Discharge Note   Discharge Disposition Home   Hospital Follow Up  Appt(s) scheduled? (Neurosurgery clinic to schedule follow up appointment.)   Discharge plans and expectations educations in teach back method with documentation complete? Yes

## 2018-04-10 ENCOUNTER — TELEPHONE (OUTPATIENT)
Dept: NEUROSURGERY | Facility: CLINIC | Age: 53
End: 2018-04-10

## 2018-04-10 NOTE — TELEPHONE ENCOUNTER
----- Message from Doris Christian sent at 4/10/2018 11:34 AM CDT -----  Contact: Pt  Pt is requesting a callback needs to know if she should go back to the hospital says she have a headache     Pt can be reached at [phone]    Thanks

## 2018-04-10 NOTE — TELEPHONE ENCOUNTER
SW PT, DISCUSSED HER HA, SHE HAS PO MEDICATION THAT SHE CAN TAKE AND ALSO CAN TAKE TYLENOL. SHE ALSO KNOWS THAT IF THINGS GET TOO BAD SHE CAN GO TO THE ED.

## 2018-04-10 NOTE — TELEPHONE ENCOUNTER
----- Message from Rob Randhawa sent at 4/10/2018  1:43 PM CDT -----  Contact: Patient @ [phone]  Patient is returning a missed call, pls return call

## 2018-04-11 ENCOUNTER — TELEPHONE (OUTPATIENT)
Dept: NEUROSURGERY | Facility: CLINIC | Age: 53
End: 2018-04-11

## 2018-04-11 NOTE — TELEPHONE ENCOUNTER
----- Message from Gabriele Ely PA-C sent at 4/7/2018  8:10 AM CDT -----  Please schedule 2 wk follow up with CT head prior with any PA. She lives in [place] so needs extra time to drive in. Thanks!

## 2018-04-12 PROBLEM — I10 HTN (HYPERTENSION): Status: ACTIVE | Noted: 2018-04-12

## 2018-04-12 PROBLEM — I62.9 INTRACRANIAL HEMORRHAGE: Status: ACTIVE | Noted: 2018-04-12

## 2018-04-15 ENCOUNTER — NURSE TRIAGE (OUTPATIENT)
Dept: ADMINISTRATIVE | Facility: CLINIC | Age: 53
End: 2018-04-15

## 2018-04-15 NOTE — TELEPHONE ENCOUNTER
Reason for Disposition   Difficult to awaken or acting confused  (e.g., disoriented, slurred speech)    Protocols used: ST HEADACHE-A-AH

## 2018-04-16 ENCOUNTER — HOSPITAL ENCOUNTER (EMERGENCY)
Facility: HOSPITAL | Age: 53
Discharge: HOME | End: 2018-04-16
Attending: FAMILY MEDICINE

## 2018-04-16 VITALS
RESPIRATION RATE: 18 BRPM | OXYGEN SATURATION: 100 % | HEART RATE: 92 BPM | WEIGHT: 132 LBS | TEMPERATURE: 99 F | SYSTOLIC BLOOD PRESSURE: 144 MMHG | BODY MASS INDEX: 22.66 KG/M2 | DIASTOLIC BLOOD PRESSURE: 98 MMHG

## 2018-04-16 DIAGNOSIS — R10.9 ABDOMINAL CRAMPS: Primary | ICD-10-CM

## 2018-04-16 LAB
ALBUMIN SERPL BCP-MCNC: 2.7 G/DL
ALP SERPL-CCNC: 77 U/L
ALT SERPL W/O P-5'-P-CCNC: 10 U/L
ANION GAP SERPL CALC-SCNC: 12 MMOL/L
AST SERPL-CCNC: 15 U/L
BASOPHILS # BLD AUTO: 0.06 K/UL
BASOPHILS NFR BLD: 0.9 %
BILIRUB SERPL-MCNC: 0.3 MG/DL
BUN SERPL-MCNC: 6 MG/DL
CALCIUM SERPL-MCNC: 8.8 MG/DL
CHLORIDE SERPL-SCNC: 99 MMOL/L
CO2 SERPL-SCNC: 21 MMOL/L
CREAT SERPL-MCNC: 0.5 MG/DL
DIFFERENTIAL METHOD: ABNORMAL
EOSINOPHIL # BLD AUTO: 0.2 K/UL
EOSINOPHIL NFR BLD: 3.2 %
ERYTHROCYTE [DISTWIDTH] IN BLOOD BY AUTOMATED COUNT: 13.5 %
EST. GFR  (AFRICAN AMERICAN): >60 ML/MIN/1.73 M^2
EST. GFR  (NON AFRICAN AMERICAN): >60 ML/MIN/1.73 M^2
ETHANOL SERPL-MCNC: 10 MG/DL
GLUCOSE SERPL-MCNC: 90 MG/DL
HCT VFR BLD AUTO: 30.1 %
HGB BLD-MCNC: 10.5 G/DL
IMM GRANULOCYTES # BLD AUTO: 0.02 K/UL
IMM GRANULOCYTES NFR BLD AUTO: 0.3 %
LYMPHOCYTES # BLD AUTO: 1.2 K/UL
LYMPHOCYTES NFR BLD: 16.9 %
MCH RBC QN AUTO: 30.1 PG
MCHC RBC AUTO-ENTMCNC: 34.9 G/DL
MCV RBC AUTO: 86 FL
MONOCYTES # BLD AUTO: 0.4 K/UL
MONOCYTES NFR BLD: 5.1 %
NEUTROPHILS # BLD AUTO: 5.1 K/UL
NEUTROPHILS NFR BLD: 73.6 %
NRBC BLD-RTO: 0 /100 WBC
PLATELET # BLD AUTO: 417 K/UL
PMV BLD AUTO: 9.7 FL
POTASSIUM SERPL-SCNC: 4 MMOL/L
PROT SERPL-MCNC: 6.5 G/DL
RBC # BLD AUTO: 3.49 M/UL
SODIUM SERPL-SCNC: 132 MMOL/L
WBC # BLD AUTO: 6.92 K/UL

## 2018-04-16 PROCEDURE — 85025 COMPLETE CBC W/AUTO DIFF WBC: CPT

## 2018-04-16 PROCEDURE — 96374 THER/PROPH/DIAG INJ IV PUSH: CPT

## 2018-04-16 PROCEDURE — 63600175 PHARM REV CODE 636 W HCPCS: Performed by: FAMILY MEDICINE

## 2018-04-16 PROCEDURE — 80053 COMPREHEN METABOLIC PANEL: CPT

## 2018-04-16 PROCEDURE — 80320 DRUG SCREEN QUANTALCOHOLS: CPT

## 2018-04-16 PROCEDURE — 99284 EMERGENCY DEPT VISIT MOD MDM: CPT | Mod: 25

## 2018-04-16 PROCEDURE — 25000003 PHARM REV CODE 250: Performed by: FAMILY MEDICINE

## 2018-04-16 RX ORDER — ONDANSETRON 4 MG/1
8 TABLET, ORALLY DISINTEGRATING ORAL
Status: COMPLETED | OUTPATIENT
Start: 2018-04-16 | End: 2018-04-16

## 2018-04-16 RX ORDER — LORAZEPAM 2 MG/ML
1 INJECTION INTRAMUSCULAR
Status: COMPLETED | OUTPATIENT
Start: 2018-04-16 | End: 2018-04-16

## 2018-04-16 RX ADMIN — ONDANSETRON 8 MG: 4 TABLET, ORALLY DISINTEGRATING ORAL at 01:04

## 2018-04-16 RX ADMIN — LORAZEPAM 1 MG: 2 INJECTION INTRAMUSCULAR; INTRAVENOUS at 01:04

## 2018-04-16 NOTE — ED PROVIDER NOTES
Encounter Date: 4/16/2018       History     Chief Complaint   Patient presents with    Abdominal Pain     54 yo female presents to ED  complaining of pain to abdomen, cramping, associated with diarrhea, some nausea, no vomiting, pt was discharged from [facility] on 4/7/18 with subdural hematoma, seizures, alcohol abuse, she denies drinking any alcohol and also denies receiving benzodiazepines on a schedule in the hospital, pt was prescribed Percocet, denies any new trauma, pain is cramping, intermittant and dull          Review of patient's allergies indicates:  No Known Allergies  Past Medical History:   Diagnosis Date    Anxiety     Depression     History of psychiatric care     Hypertension     Psychiatric exam     Seizures      Past Surgical History:   Procedure Laterality Date    BACK SURGERY      TONSILLECTOMY       History reviewed. No pertinent family history.  Social History   Substance Use Topics    Smoking status: Current Every Day Smoker     Packs/day: 1.00     Years: 10.00    Smokeless tobacco: Not on file    Alcohol use 0.0 oz/week     5 - 10 Cans of beer per week     Review of Systems   Constitutional: Negative for fever.   HENT: Negative for sore throat.    Eyes: Negative for pain and redness.   Respiratory: Negative for shortness of breath.    Cardiovascular: Negative for chest pain.   Gastrointestinal: Negative for nausea.   Genitourinary: Negative for dysuria.   Musculoskeletal: Negative for back pain.   Skin: Negative for rash.   Neurological: Negative for dizziness and weakness.   Hematological: Does not bruise/bleed easily.       Physical Exam     Initial Vitals [04/16/18 0013]   BP Pulse Resp Temp SpO2   (!) 144/98 92 18 99.2 °F (37.3 °C) 100 %      MAP       113.33         Physical Exam    Nursing note and vitals reviewed.  Constitutional: She appears well-developed and well-nourished. She is not diaphoretic. No distress.   HENT:   Head: Normocephalic and atraumatic.   Right  Ear: External ear normal.   Left Ear: External ear normal.   Eyes: Pupils are equal, round, and reactive to light. Right eye exhibits no discharge. Left eye exhibits no discharge.   Neck: No tracheal deviation present. No JVD present.   Cardiovascular: Exam reveals no friction rub.    No murmur heard.  Pulmonary/Chest: No stridor. No respiratory distress. She has no wheezes. She has no rales.   Abdominal: Bowel sounds are normal. She exhibits no distension.   Musculoskeletal: Normal range of motion.   Neurological: She is alert.   Skin: Skin is warm.   Psychiatric: She has a normal mood and affect.         ED Course   Procedures  Labs Reviewed   CBC W/ AUTO DIFFERENTIAL   COMPREHENSIVE METABOLIC PANEL   ALCOHOL,MEDICAL (ETHANOL)   DRUG SCREEN PANEL, URINE EMERGENCY                Additional MDM:   Differential Diagnosis:   Symptom: Abdominal pain. <> The follow diagnoses were considered and will be evaluated: Aortic Aneurysm, Colitis, Gastritis, Gastroenteritis, Gastroesophageal Reflux, Narcotic Withdrawal and Ruptured AAA.                        Clinical Impression:   The encounter diagnosis was Abdominal cramps.                           Den Handy MD  04/16/18 0803       Den Handy MD  04/16/18 0803

## 2018-04-26 ENCOUNTER — TELEPHONE (OUTPATIENT)
Dept: NEUROSURGERY | Facility: CLINIC | Age: 53
End: 2018-04-26

## 2018-04-26 ENCOUNTER — HOSPITAL ENCOUNTER (OUTPATIENT)
Dept: RADIOLOGY | Facility: HOSPITAL | Age: 53
Discharge: HOME | End: 2018-04-26
Attending: PHYSICIAN ASSISTANT
Payer: MEDICAID

## 2018-04-26 ENCOUNTER — HOSPITAL ENCOUNTER (INPATIENT)
Facility: HOSPITAL | Age: 53
LOS: 7 days | Discharge: HOME | DRG: 026 | End: 2018-05-03
Attending: EMERGENCY MEDICINE | Admitting: PSYCHIATRY & NEUROLOGY
Payer: MEDICAID

## 2018-04-26 ENCOUNTER — ANESTHESIA EVENT (OUTPATIENT)
Dept: SURGERY | Facility: HOSPITAL | Age: 53
DRG: 026 | End: 2018-04-26

## 2018-04-26 DIAGNOSIS — R51.9 HEADACHE: ICD-10-CM

## 2018-04-26 DIAGNOSIS — I62.00 SUBDURAL HEMORRHAGE: ICD-10-CM

## 2018-04-26 DIAGNOSIS — I62.03 CHRONIC SUBDURAL HEMATOMA: Primary | ICD-10-CM

## 2018-04-26 DIAGNOSIS — E87.1 HYPONATREMIA: ICD-10-CM

## 2018-04-26 LAB
ABO + RH BLD: NORMAL
ALBUMIN SERPL BCP-MCNC: 3.5 G/DL
ALP SERPL-CCNC: 105 U/L
ALT SERPL W/O P-5'-P-CCNC: 5 U/L
ANION GAP SERPL CALC-SCNC: 11 MMOL/L
APTT BLDCRRT: 26.1 SEC
AST SERPL-CCNC: 11 U/L
BASOPHILS # BLD AUTO: 0.1 K/UL
BASOPHILS NFR BLD: 1.3 %
BILIRUB SERPL-MCNC: 0.2 MG/DL
BILIRUB UR QL STRIP: NEGATIVE
BLD GP AB SCN CELLS X3 SERPL QL: NORMAL
BUN SERPL-MCNC: 9 MG/DL
CALCIUM SERPL-MCNC: 10 MG/DL
CHLORIDE SERPL-SCNC: 98 MMOL/L
CHOLEST SERPL-MCNC: 201 MG/DL
CHOLEST/HDLC SERPL: 4.1 {RATIO}
CLARITY UR REFRACT.AUTO: CLEAR
CO2 SERPL-SCNC: 18 MMOL/L
COLOR UR AUTO: YELLOW
CREAT SERPL-MCNC: 0.7 MG/DL
DIFFERENTIAL METHOD: ABNORMAL
EOSINOPHIL # BLD AUTO: 0 K/UL
EOSINOPHIL NFR BLD: 0.3 %
ERYTHROCYTE [DISTWIDTH] IN BLOOD BY AUTOMATED COUNT: 16.2 %
EST. GFR  (AFRICAN AMERICAN): >60 ML/MIN/1.73 M^2
EST. GFR  (NON AFRICAN AMERICAN): >60 ML/MIN/1.73 M^2
ESTIMATED AVG GLUCOSE: 82 MG/DL
GLUCOSE SERPL-MCNC: 88 MG/DL
GLUCOSE UR QL STRIP: NEGATIVE
HBA1C MFR BLD HPLC: 4.5 %
HCT VFR BLD AUTO: 36.2 %
HDLC SERPL-MCNC: 49 MG/DL
HDLC SERPL: 24.4 %
HGB BLD-MCNC: 11.9 G/DL
HGB UR QL STRIP: NEGATIVE
IMM GRANULOCYTES # BLD AUTO: 0.06 K/UL
IMM GRANULOCYTES NFR BLD AUTO: 0.8 %
INR PPP: 1
KETONES UR QL STRIP: ABNORMAL
LDLC SERPL CALC-MCNC: 121.4 MG/DL
LEUKOCYTE ESTERASE UR QL STRIP: NEGATIVE
LYMPHOCYTES # BLD AUTO: 2.1 K/UL
LYMPHOCYTES NFR BLD: 26.8 %
MCH RBC QN AUTO: 30.3 PG
MCHC RBC AUTO-ENTMCNC: 32.9 G/DL
MCV RBC AUTO: 92 FL
MONOCYTES # BLD AUTO: 0.4 K/UL
MONOCYTES NFR BLD: 5.5 %
NEUTROPHILS # BLD AUTO: 5.2 K/UL
NEUTROPHILS NFR BLD: 65.3 %
NITRITE UR QL STRIP: NEGATIVE
NONHDLC SERPL-MCNC: 152 MG/DL
NRBC BLD-RTO: 0 /100 WBC
PH UR STRIP: 5 [PH] (ref 5–8)
PLATELET # BLD AUTO: 637 K/UL
PMV BLD AUTO: 9.3 FL
POTASSIUM SERPL-SCNC: 4 MMOL/L
PROT SERPL-MCNC: 7.4 G/DL
PROT UR QL STRIP: NEGATIVE
PROTHROMBIN TIME: 10.2 SEC
RBC # BLD AUTO: 3.93 M/UL
SODIUM SERPL-SCNC: 127 MMOL/L
SP GR UR STRIP: 1.02 (ref 1–1.03)
TRIGL SERPL-MCNC: 153 MG/DL
TSH SERPL DL<=0.005 MIU/L-ACNC: 3.58 UIU/ML
URN SPEC COLLECT METH UR: ABNORMAL
UROBILINOGEN UR STRIP-ACNC: NEGATIVE EU/DL
WBC # BLD AUTO: 7.87 K/UL

## 2018-04-26 PROCEDURE — 80061 LIPID PANEL: CPT

## 2018-04-26 PROCEDURE — A4217 STERILE WATER/SALINE, 500 ML: HCPCS | Performed by: NURSE PRACTITIONER

## 2018-04-26 PROCEDURE — 80185 ASSAY OF PHENYTOIN TOTAL: CPT

## 2018-04-26 PROCEDURE — 84443 ASSAY THYROID STIM HORMONE: CPT

## 2018-04-26 PROCEDURE — 85730 THROMBOPLASTIN TIME PARTIAL: CPT

## 2018-04-26 PROCEDURE — 96365 THER/PROPH/DIAG IV INF INIT: CPT

## 2018-04-26 PROCEDURE — 96367 TX/PROPH/DG ADDL SEQ IV INF: CPT

## 2018-04-26 PROCEDURE — 96366 THER/PROPH/DIAG IV INF ADDON: CPT

## 2018-04-26 PROCEDURE — 85025 COMPLETE CBC W/AUTO DIFF WBC: CPT

## 2018-04-26 PROCEDURE — 85610 PROTHROMBIN TIME: CPT

## 2018-04-26 PROCEDURE — 12000002 HC ACUTE/MED SURGE SEMI-PRIVATE ROOM

## 2018-04-26 PROCEDURE — 93010 ELECTROCARDIOGRAM REPORT: CPT | Mod: ,,, | Performed by: INTERNAL MEDICINE

## 2018-04-26 PROCEDURE — 70450 CT HEAD/BRAIN W/O DYE: CPT | Mod: 26,,, | Performed by: RADIOLOGY

## 2018-04-26 PROCEDURE — 63600175 PHARM REV CODE 636 W HCPCS: Performed by: NURSE PRACTITIONER

## 2018-04-26 PROCEDURE — 80186 ASSAY OF PHENYTOIN FREE: CPT

## 2018-04-26 PROCEDURE — 25000003 PHARM REV CODE 250: Performed by: EMERGENCY MEDICINE

## 2018-04-26 PROCEDURE — 86850 RBC ANTIBODY SCREEN: CPT

## 2018-04-26 PROCEDURE — 25000003 PHARM REV CODE 250: Performed by: NURSE PRACTITIONER

## 2018-04-26 PROCEDURE — 81003 URINALYSIS AUTO W/O SCOPE: CPT

## 2018-04-26 PROCEDURE — 83036 HEMOGLOBIN GLYCOSYLATED A1C: CPT

## 2018-04-26 PROCEDURE — 70450 CT HEAD/BRAIN W/O DYE: CPT | Mod: TC

## 2018-04-26 PROCEDURE — A4216 STERILE WATER/SALINE, 10 ML: HCPCS | Performed by: NURSE PRACTITIONER

## 2018-04-26 PROCEDURE — 99291 CRITICAL CARE FIRST HOUR: CPT | Mod: 25

## 2018-04-26 PROCEDURE — 99223 1ST HOSP IP/OBS HIGH 75: CPT | Mod: ,,, | Performed by: NURSE PRACTITIONER

## 2018-04-26 PROCEDURE — 80053 COMPREHEN METABOLIC PANEL: CPT

## 2018-04-26 RX ORDER — PHENYTOIN SODIUM 100 MG/1
100 CAPSULE, EXTENDED RELEASE ORAL 3 TIMES DAILY
Status: DISCONTINUED | OUTPATIENT
Start: 2018-04-26 | End: 2018-05-03 | Stop reason: HOSPADM

## 2018-04-26 RX ORDER — LABETALOL HYDROCHLORIDE 5 MG/ML
10 INJECTION, SOLUTION INTRAVENOUS EVERY 4 HOURS PRN
Status: DISCONTINUED | OUTPATIENT
Start: 2018-04-26 | End: 2018-05-03 | Stop reason: HOSPADM

## 2018-04-26 RX ORDER — NICARDIPINE HYDROCHLORIDE 0.2 MG/ML
2.5 INJECTION INTRAVENOUS CONTINUOUS
Status: DISCONTINUED | OUTPATIENT
Start: 2018-04-26 | End: 2018-04-30

## 2018-04-26 RX ORDER — ACETAMINOPHEN 325 MG/1
650 TABLET ORAL EVERY 6 HOURS PRN
Status: DISCONTINUED | OUTPATIENT
Start: 2018-04-27 | End: 2018-05-03 | Stop reason: HOSPADM

## 2018-04-26 RX ORDER — NICARDIPINE HYDROCHLORIDE 0.2 MG/ML
2.5 INJECTION INTRAVENOUS CONTINUOUS
Status: DISCONTINUED | OUTPATIENT
Start: 2018-04-26 | End: 2018-04-26

## 2018-04-26 RX ORDER — SODIUM CHLORIDE 0.9 % (FLUSH) 0.9 %
3 SYRINGE (ML) INJECTION EVERY 8 HOURS
Status: DISCONTINUED | OUTPATIENT
Start: 2018-04-26 | End: 2018-05-03 | Stop reason: HOSPADM

## 2018-04-26 RX ORDER — ONDANSETRON 8 MG/1
8 TABLET, ORALLY DISINTEGRATING ORAL EVERY 8 HOURS PRN
Status: DISCONTINUED | OUTPATIENT
Start: 2018-04-26 | End: 2018-05-03 | Stop reason: HOSPADM

## 2018-04-26 RX ADMIN — NICARDIPINE HYDROCHLORIDE 2.5 MG/HR: 0.2 INJECTION, SOLUTION INTRAVENOUS at 09:04

## 2018-04-26 RX ADMIN — PHENYTOIN SODIUM 100 MG: 100 CAPSULE ORAL at 10:04

## 2018-04-26 RX ADMIN — ACETAMINOPHEN 650 MG: 325 TABLET ORAL at 11:04

## 2018-04-26 RX ADMIN — SODIUM CHLORIDE: 234 INJECTION, SOLUTION INTRAVENOUS at 10:04

## 2018-04-26 RX ADMIN — Medication 3 ML: at 10:04

## 2018-04-26 NOTE — TELEPHONE ENCOUNTER
PT HAS AN APPT WITH DR RODAS FOR CT REVIEW DONE TODAY, SDH. PT WAS NOT ABLE TO STAY FOR HER APPT AND LEFT. DR RODAS HAS REVIEWED HER SCAN AND HAS RECOMMENDED THAT THE RETURN TO THE HOSPITAL/ED TONIGHT FOR SURGERY TOMORROW TO EVACUATE THE ENLARGED SDH. I HAVE SW THE PT AND RELAYED THIS RECOMMENDATION, SHE TOLE ME THAT SHE WILL BE HERE TONIGHT. SHE WILL HAVE TO FIND A RIDE TO GET HERE. PT UNDERSTOOD THE GRAVITY OF THIS RECOMMENDATION.

## 2018-04-27 ENCOUNTER — ANESTHESIA (OUTPATIENT)
Dept: SURGERY | Facility: HOSPITAL | Age: 53
DRG: 026 | End: 2018-04-27
Payer: MEDICAID

## 2018-04-27 PROBLEM — G93.5 BRAIN HERNIATION: Status: ACTIVE | Noted: 2018-04-27

## 2018-04-27 LAB
ALBUMIN SERPL BCP-MCNC: 3.1 G/DL
ALBUMIN SERPL BCP-MCNC: 3.1 G/DL
ALP SERPL-CCNC: 91 U/L
ALP SERPL-CCNC: 91 U/L
ALT SERPL W/O P-5'-P-CCNC: <5 U/L
ALT SERPL W/O P-5'-P-CCNC: <5 U/L
ANION GAP SERPL CALC-SCNC: 11 MMOL/L
ANION GAP SERPL CALC-SCNC: 11 MMOL/L
AST SERPL-CCNC: 9 U/L
AST SERPL-CCNC: 9 U/L
BASOPHILS # BLD AUTO: 0.07 K/UL
BASOPHILS NFR BLD: 1.1 %
BILIRUB SERPL-MCNC: 0.2 MG/DL
BILIRUB SERPL-MCNC: 0.2 MG/DL
BUN SERPL-MCNC: 7 MG/DL
BUN SERPL-MCNC: 7 MG/DL
CALCIUM SERPL-MCNC: 9.2 MG/DL
CALCIUM SERPL-MCNC: 9.2 MG/DL
CHLORIDE SERPL-SCNC: 102 MMOL/L
CHLORIDE SERPL-SCNC: 102 MMOL/L
CO2 SERPL-SCNC: 17 MMOL/L
CO2 SERPL-SCNC: 17 MMOL/L
CREAT SERPL-MCNC: 0.6 MG/DL
CREAT SERPL-MCNC: 0.6 MG/DL
DIASTOLIC DYSFUNCTION: NO
DIFFERENTIAL METHOD: ABNORMAL
EOSINOPHIL # BLD AUTO: 0.1 K/UL
EOSINOPHIL NFR BLD: 0.9 %
ERYTHROCYTE [DISTWIDTH] IN BLOOD BY AUTOMATED COUNT: 16 %
EST. GFR  (AFRICAN AMERICAN): >60 ML/MIN/1.73 M^2
EST. GFR  (AFRICAN AMERICAN): >60 ML/MIN/1.73 M^2
EST. GFR  (NON AFRICAN AMERICAN): >60 ML/MIN/1.73 M^2
EST. GFR  (NON AFRICAN AMERICAN): >60 ML/MIN/1.73 M^2
GLUCOSE SERPL-MCNC: 87 MG/DL
GLUCOSE SERPL-MCNC: 87 MG/DL
HCT VFR BLD AUTO: 32.2 %
HGB BLD-MCNC: 10.7 G/DL
IMM GRANULOCYTES # BLD AUTO: 0.04 K/UL
IMM GRANULOCYTES NFR BLD AUTO: 0.6 %
LYMPHOCYTES # BLD AUTO: 1.7 K/UL
LYMPHOCYTES NFR BLD: 25.9 %
MAGNESIUM SERPL-MCNC: 1.5 MG/DL
MAGNESIUM SERPL-MCNC: 1.8 MG/DL
MCH RBC QN AUTO: 29.2 PG
MCHC RBC AUTO-ENTMCNC: 33.2 G/DL
MCV RBC AUTO: 88 FL
MONOCYTES # BLD AUTO: 0.5 K/UL
MONOCYTES NFR BLD: 7.2 %
NEUTROPHILS # BLD AUTO: 4.2 K/UL
NEUTROPHILS NFR BLD: 64.3 %
NRBC BLD-RTO: 0 /100 WBC
PHOSPHATE SERPL-MCNC: 3 MG/DL
PLATELET # BLD AUTO: 546 K/UL
PMV BLD AUTO: 9 FL
POCT GLUCOSE: 108 MG/DL (ref 70–110)
POCT GLUCOSE: 75 MG/DL (ref 70–110)
POTASSIUM SERPL-SCNC: 3.6 MMOL/L
POTASSIUM SERPL-SCNC: 3.6 MMOL/L
POTASSIUM SERPL-SCNC: 4.7 MMOL/L
PROT SERPL-MCNC: 6.3 G/DL
PROT SERPL-MCNC: 6.3 G/DL
RBC # BLD AUTO: 3.66 M/UL
RETIRED EF AND QEF - SEE NOTES: 68 (ref 55–65)
SODIUM SERPL-SCNC: 130 MMOL/L
SODIUM SERPL-SCNC: 131 MMOL/L
SODIUM SERPL-SCNC: 132 MMOL/L
SODIUM SERPL-SCNC: 132 MMOL/L
SODIUM SERPL-SCNC: 133 MMOL/L
SODIUM SERPL-SCNC: 137 MMOL/L
WBC # BLD AUTO: 6.53 K/UL

## 2018-04-27 PROCEDURE — 25000003 PHARM REV CODE 250: Performed by: NURSE PRACTITIONER

## 2018-04-27 PROCEDURE — 63600175 PHARM REV CODE 636 W HCPCS

## 2018-04-27 PROCEDURE — 84100 ASSAY OF PHOSPHORUS: CPT

## 2018-04-27 PROCEDURE — 25000003 PHARM REV CODE 250: Performed by: NEUROLOGICAL SURGERY

## 2018-04-27 PROCEDURE — 84295 ASSAY OF SERUM SODIUM: CPT | Mod: 91

## 2018-04-27 PROCEDURE — 93306 TTE W/DOPPLER COMPLETE: CPT | Mod: 26,,, | Performed by: INTERNAL MEDICINE

## 2018-04-27 PROCEDURE — 96365 THER/PROPH/DIAG IV INF INIT: CPT | Mod: 59

## 2018-04-27 PROCEDURE — 25000003 PHARM REV CODE 250

## 2018-04-27 PROCEDURE — 63600175 PHARM REV CODE 636 W HCPCS: Performed by: NURSE PRACTITIONER

## 2018-04-27 PROCEDURE — 61154 BURR HOLE W/EVAC&/DRG HMTMA: CPT | Mod: RT,,, | Performed by: NEUROLOGICAL SURGERY

## 2018-04-27 PROCEDURE — 25000003 PHARM REV CODE 250: Performed by: ANESTHESIOLOGY

## 2018-04-27 PROCEDURE — 36620 INSERTION CATHETER ARTERY: CPT | Mod: ,,, | Performed by: NURSE PRACTITIONER

## 2018-04-27 PROCEDURE — 84132 ASSAY OF SERUM POTASSIUM: CPT

## 2018-04-27 PROCEDURE — 37000008 HC ANESTHESIA 1ST 15 MINUTES: Performed by: NEUROLOGICAL SURGERY

## 2018-04-27 PROCEDURE — 94761 N-INVAS EAR/PLS OXIMETRY MLT: CPT

## 2018-04-27 PROCEDURE — 36000710: Performed by: NEUROLOGICAL SURGERY

## 2018-04-27 PROCEDURE — 93306 TTE W/DOPPLER COMPLETE: CPT

## 2018-04-27 PROCEDURE — 63600175 PHARM REV CODE 636 W HCPCS: Performed by: NURSE ANESTHETIST, CERTIFIED REGISTERED

## 2018-04-27 PROCEDURE — 27201423 OPTIME MED/SURG SUP & DEVICES STERILE SUPPLY: Performed by: NEUROLOGICAL SURGERY

## 2018-04-27 PROCEDURE — C1729 CATH, DRAINAGE: HCPCS | Performed by: NEUROLOGICAL SURGERY

## 2018-04-27 PROCEDURE — 85025 COMPLETE CBC W/AUTO DIFF WBC: CPT

## 2018-04-27 PROCEDURE — 83735 ASSAY OF MAGNESIUM: CPT

## 2018-04-27 PROCEDURE — A4216 STERILE WATER/SALINE, 10 ML: HCPCS | Performed by: NURSE PRACTITIONER

## 2018-04-27 PROCEDURE — 20000000 HC ICU ROOM

## 2018-04-27 PROCEDURE — 25000003 PHARM REV CODE 250: Performed by: STUDENT IN AN ORGANIZED HEALTH CARE EDUCATION/TRAINING PROGRAM

## 2018-04-27 PROCEDURE — 37000009 HC ANESTHESIA EA ADD 15 MINS: Performed by: NEUROLOGICAL SURGERY

## 2018-04-27 PROCEDURE — 80053 COMPREHEN METABOLIC PANEL: CPT

## 2018-04-27 PROCEDURE — 36000711: Performed by: NEUROLOGICAL SURGERY

## 2018-04-27 PROCEDURE — 97802 MEDICAL NUTRITION INDIV IN: CPT

## 2018-04-27 PROCEDURE — C1713 ANCHOR/SCREW BN/BN,TIS/BN: HCPCS | Performed by: NEUROLOGICAL SURGERY

## 2018-04-27 PROCEDURE — 63600175 PHARM REV CODE 636 W HCPCS: Performed by: NEUROLOGICAL SURGERY

## 2018-04-27 PROCEDURE — 82962 GLUCOSE BLOOD TEST: CPT

## 2018-04-27 PROCEDURE — D9220A PRA ANESTHESIA: Mod: ,,, | Performed by: ANESTHESIOLOGY

## 2018-04-27 PROCEDURE — 00C43ZZ EXTIRPATION OF MATTER FROM INTRACRANIAL SUBDURAL SPACE, PERCUTANEOUS APPROACH: ICD-10-PCS | Performed by: NEUROLOGICAL SURGERY

## 2018-04-27 PROCEDURE — 99291 CRITICAL CARE FIRST HOUR: CPT | Mod: ,,, | Performed by: ANESTHESIOLOGY

## 2018-04-27 PROCEDURE — 25000003 PHARM REV CODE 250: Performed by: NURSE ANESTHETIST, CERTIFIED REGISTERED

## 2018-04-27 PROCEDURE — 83735 ASSAY OF MAGNESIUM: CPT | Mod: 91

## 2018-04-27 DEVICE — PLATE BONE BUR HOLE COVER 10MM: Type: IMPLANTABLE DEVICE | Site: SCALP | Status: FUNCTIONAL

## 2018-04-27 DEVICE — SCREW UN3 1.5X4 NEW HEAD SD: Type: IMPLANTABLE DEVICE | Site: SCALP | Status: FUNCTIONAL

## 2018-04-27 RX ORDER — SODIUM CHLORIDE 9 MG/ML
INJECTION, SOLUTION INTRAVENOUS CONTINUOUS
Status: DISCONTINUED | OUTPATIENT
Start: 2018-04-27 | End: 2018-04-27

## 2018-04-27 RX ORDER — ONDANSETRON 2 MG/ML
INJECTION INTRAMUSCULAR; INTRAVENOUS
Status: DISCONTINUED | OUTPATIENT
Start: 2018-04-27 | End: 2018-04-27

## 2018-04-27 RX ORDER — FENTANYL CITRATE 50 UG/ML
INJECTION, SOLUTION INTRAMUSCULAR; INTRAVENOUS
Status: DISCONTINUED | OUTPATIENT
Start: 2018-04-27 | End: 2018-04-27

## 2018-04-27 RX ORDER — MAGNESIUM SULFATE HEPTAHYDRATE 40 MG/ML
2 INJECTION, SOLUTION INTRAVENOUS
Status: DISCONTINUED | OUTPATIENT
Start: 2018-04-27 | End: 2018-04-30

## 2018-04-27 RX ORDER — POTASSIUM CHLORIDE 7.45 MG/ML
60 INJECTION INTRAVENOUS
Status: DISCONTINUED | OUTPATIENT
Start: 2018-04-27 | End: 2018-04-30

## 2018-04-27 RX ORDER — OXYCODONE HYDROCHLORIDE 5 MG/1
5 TABLET ORAL EVERY 6 HOURS PRN
Status: DISCONTINUED | OUTPATIENT
Start: 2018-04-27 | End: 2018-05-03 | Stop reason: HOSPADM

## 2018-04-27 RX ORDER — BACITRACIN 50000 [IU]/1
INJECTION, POWDER, FOR SOLUTION INTRAMUSCULAR
Status: DISCONTINUED | OUTPATIENT
Start: 2018-04-27 | End: 2018-04-27 | Stop reason: HOSPADM

## 2018-04-27 RX ORDER — POTASSIUM CHLORIDE 7.45 MG/ML
40 INJECTION INTRAVENOUS
Status: DISCONTINUED | OUTPATIENT
Start: 2018-04-27 | End: 2018-04-30

## 2018-04-27 RX ORDER — SODIUM CHLORIDE 450 MG/100ML
INJECTION, SOLUTION INTRAVENOUS CONTINUOUS
Status: DISCONTINUED | OUTPATIENT
Start: 2018-04-27 | End: 2018-04-27

## 2018-04-27 RX ORDER — DEXAMETHASONE SODIUM PHOSPHATE 4 MG/ML
INJECTION, SOLUTION INTRA-ARTICULAR; INTRALESIONAL; INTRAMUSCULAR; INTRAVENOUS; SOFT TISSUE
Status: DISCONTINUED | OUTPATIENT
Start: 2018-04-27 | End: 2018-04-27

## 2018-04-27 RX ORDER — MAGNESIUM SULFATE HEPTAHYDRATE 40 MG/ML
4 INJECTION, SOLUTION INTRAVENOUS
Status: DISCONTINUED | OUTPATIENT
Start: 2018-04-27 | End: 2018-04-30

## 2018-04-27 RX ORDER — PROPOFOL 10 MG/ML
VIAL (ML) INTRAVENOUS
Status: DISCONTINUED | OUTPATIENT
Start: 2018-04-27 | End: 2018-04-27

## 2018-04-27 RX ORDER — LIDOCAINE HYDROCHLORIDE AND EPINEPHRINE 10; 10 MG/ML; UG/ML
INJECTION, SOLUTION INFILTRATION; PERINEURAL
Status: DISCONTINUED | OUTPATIENT
Start: 2018-04-27 | End: 2018-04-27 | Stop reason: HOSPADM

## 2018-04-27 RX ORDER — LEVOTHYROXINE SODIUM 75 UG/1
75 TABLET ORAL
Status: DISCONTINUED | OUTPATIENT
Start: 2018-04-28 | End: 2018-05-03 | Stop reason: HOSPADM

## 2018-04-27 RX ORDER — FENTANYL CITRATE 50 UG/ML
25 INJECTION, SOLUTION INTRAMUSCULAR; INTRAVENOUS ONCE
Status: COMPLETED | OUTPATIENT
Start: 2018-04-27 | End: 2018-04-27

## 2018-04-27 RX ORDER — FENTANYL CITRATE 50 UG/ML
INJECTION, SOLUTION INTRAMUSCULAR; INTRAVENOUS
Status: COMPLETED
Start: 2018-04-27 | End: 2018-04-27

## 2018-04-27 RX ORDER — LIDOCAINE HYDROCHLORIDE 20 MG/ML
INJECTION, SOLUTION INFILTRATION; PERINEURAL
Status: COMPLETED
Start: 2018-04-27 | End: 2018-04-27

## 2018-04-27 RX ORDER — LIDOCAINE HYDROCHLORIDE 10 MG/ML
1 INJECTION INFILTRATION; PERINEURAL ONCE
Status: DISCONTINUED | OUTPATIENT
Start: 2018-04-27 | End: 2018-04-30

## 2018-04-27 RX ORDER — POTASSIUM CHLORIDE 7.45 MG/ML
80 INJECTION INTRAVENOUS
Status: DISCONTINUED | OUTPATIENT
Start: 2018-04-27 | End: 2018-04-30

## 2018-04-27 RX ORDER — ROCURONIUM BROMIDE 10 MG/ML
INJECTION, SOLUTION INTRAVENOUS
Status: DISCONTINUED | OUTPATIENT
Start: 2018-04-27 | End: 2018-04-27

## 2018-04-27 RX ORDER — FENTANYL CITRATE 50 UG/ML
12.5 INJECTION, SOLUTION INTRAMUSCULAR; INTRAVENOUS
Status: DISCONTINUED | OUTPATIENT
Start: 2018-04-27 | End: 2018-04-28

## 2018-04-27 RX ORDER — BACITRACIN ZINC 500 UNIT/G
OINTMENT (GRAM) TOPICAL
Status: DISCONTINUED | OUTPATIENT
Start: 2018-04-27 | End: 2018-04-27 | Stop reason: HOSPADM

## 2018-04-27 RX ORDER — MIDAZOLAM HYDROCHLORIDE 1 MG/ML
INJECTION, SOLUTION INTRAMUSCULAR; INTRAVENOUS
Status: DISCONTINUED | OUTPATIENT
Start: 2018-04-27 | End: 2018-04-27

## 2018-04-27 RX ORDER — FENTANYL CITRATE 50 UG/ML
12.5 INJECTION, SOLUTION INTRAMUSCULAR; INTRAVENOUS ONCE
Status: COMPLETED | OUTPATIENT
Start: 2018-04-27 | End: 2018-04-27

## 2018-04-27 RX ORDER — SODIUM CHLORIDE 9 MG/ML
INJECTION, SOLUTION INTRAVENOUS CONTINUOUS PRN
Status: DISCONTINUED | OUTPATIENT
Start: 2018-04-27 | End: 2018-04-27

## 2018-04-27 RX ORDER — PHENYLEPHRINE HYDROCHLORIDE 10 MG/ML
INJECTION INTRAVENOUS
Status: DISCONTINUED | OUTPATIENT
Start: 2018-04-27 | End: 2018-04-27

## 2018-04-27 RX ORDER — SODIUM CHLORIDE 9 MG/ML
INJECTION, SOLUTION INTRAVENOUS CONTINUOUS
Status: DISCONTINUED | OUTPATIENT
Start: 2018-04-27 | End: 2018-04-30

## 2018-04-27 RX ORDER — LIDOCAINE HYDROCHLORIDE 10 MG/ML
INJECTION, SOLUTION INTRAVENOUS
Status: DISCONTINUED | OUTPATIENT
Start: 2018-04-27 | End: 2018-04-27

## 2018-04-27 RX ADMIN — FENTANYL CITRATE 50 MCG: 50 INJECTION, SOLUTION INTRAMUSCULAR; INTRAVENOUS at 01:04

## 2018-04-27 RX ADMIN — MIDAZOLAM HYDROCHLORIDE 2 MG: 1 INJECTION, SOLUTION INTRAMUSCULAR; INTRAVENOUS at 12:04

## 2018-04-27 RX ADMIN — POTASSIUM CHLORIDE 10 MEQ: 7.46 INJECTION, SOLUTION INTRAVENOUS at 12:04

## 2018-04-27 RX ADMIN — FENTANYL CITRATE 100 MCG: 50 INJECTION, SOLUTION INTRAMUSCULAR; INTRAVENOUS at 12:04

## 2018-04-27 RX ADMIN — ROCURONIUM BROMIDE 20 MG: 10 INJECTION, SOLUTION INTRAVENOUS at 01:04

## 2018-04-27 RX ADMIN — FENTANYL CITRATE 12.5 MCG: 50 INJECTION, SOLUTION INTRAMUSCULAR; INTRAVENOUS at 06:04

## 2018-04-27 RX ADMIN — LIDOCAINE HYDROCHLORIDE 400 MG: 20 INJECTION, SOLUTION INFILTRATION; PERINEURAL at 09:04

## 2018-04-27 RX ADMIN — ONDANSETRON 4 MG: 2 INJECTION INTRAMUSCULAR; INTRAVENOUS at 01:04

## 2018-04-27 RX ADMIN — PHENYLEPHRINE HYDROCHLORIDE 100 MCG: 10 INJECTION INTRAVENOUS at 01:04

## 2018-04-27 RX ADMIN — Medication 3 ML: at 05:04

## 2018-04-27 RX ADMIN — FENTANYL CITRATE 12.5 MCG: 50 INJECTION, SOLUTION INTRAMUSCULAR; INTRAVENOUS at 11:04

## 2018-04-27 RX ADMIN — ONDANSETRON 8 MG: 8 TABLET, ORALLY DISINTEGRATING ORAL at 07:04

## 2018-04-27 RX ADMIN — ONDANSETRON 8 MG: 8 TABLET, ORALLY DISINTEGRATING ORAL at 01:04

## 2018-04-27 RX ADMIN — ROCURONIUM BROMIDE 30 MG: 10 INJECTION, SOLUTION INTRAVENOUS at 01:04

## 2018-04-27 RX ADMIN — FENTANYL CITRATE 12.5 MCG: 50 INJECTION, SOLUTION INTRAMUSCULAR; INTRAVENOUS at 04:04

## 2018-04-27 RX ADMIN — PROPOFOL 200 MG: 10 INJECTION, EMULSION INTRAVENOUS at 12:04

## 2018-04-27 RX ADMIN — CEFTRIAXONE 2 G: 1 INJECTION, SOLUTION INTRAVENOUS at 01:04

## 2018-04-27 RX ADMIN — Medication 3 ML: at 04:04

## 2018-04-27 RX ADMIN — SUGAMMADEX 200 MG: 100 INJECTION, SOLUTION INTRAVENOUS at 02:04

## 2018-04-27 RX ADMIN — PHENYTOIN SODIUM 100 MG: 100 CAPSULE ORAL at 08:04

## 2018-04-27 RX ADMIN — FENTANYL CITRATE 12.5 MCG: 50 INJECTION, SOLUTION INTRAMUSCULAR; INTRAVENOUS at 10:04

## 2018-04-27 RX ADMIN — OXYCODONE HYDROCHLORIDE 5 MG: 5 TABLET ORAL at 11:04

## 2018-04-27 RX ADMIN — POTASSIUM CHLORIDE 10 MEQ: 7.46 INJECTION, SOLUTION INTRAVENOUS at 02:04

## 2018-04-27 RX ADMIN — SODIUM CHLORIDE: 0.45 INJECTION, SOLUTION INTRAVENOUS at 09:04

## 2018-04-27 RX ADMIN — DEXAMETHASONE SODIUM PHOSPHATE 12 MG: 4 INJECTION, SOLUTION INTRAMUSCULAR; INTRAVENOUS at 01:04

## 2018-04-27 RX ADMIN — FENTANYL CITRATE 25 MCG: 50 INJECTION, SOLUTION INTRAMUSCULAR; INTRAVENOUS at 10:04

## 2018-04-27 RX ADMIN — SODIUM CHLORIDE: 0.9 INJECTION, SOLUTION INTRAVENOUS at 08:04

## 2018-04-27 RX ADMIN — Medication 3 ML: at 10:04

## 2018-04-27 RX ADMIN — FENTANYL CITRATE 100 MCG: 50 INJECTION, SOLUTION INTRAMUSCULAR; INTRAVENOUS at 01:04

## 2018-04-27 RX ADMIN — PHENYTOIN SODIUM 500 MG: 100 CAPSULE ORAL at 01:04

## 2018-04-27 RX ADMIN — FENTANYL CITRATE 12.5 MCG: 50 INJECTION, SOLUTION INTRAMUSCULAR; INTRAVENOUS at 08:04

## 2018-04-27 RX ADMIN — POTASSIUM CHLORIDE 10 MEQ: 7.46 INJECTION, SOLUTION INTRAVENOUS at 10:04

## 2018-04-27 RX ADMIN — SODIUM CHLORIDE: 0.9 INJECTION, SOLUTION INTRAVENOUS at 12:04

## 2018-04-27 RX ADMIN — MAGNESIUM SULFATE IN WATER 2 G: 40 INJECTION, SOLUTION INTRAVENOUS at 06:04

## 2018-04-27 RX ADMIN — LIDOCAINE HYDROCHLORIDE 100 MG: 10 INJECTION, SOLUTION INTRAVENOUS at 12:04

## 2018-04-27 RX ADMIN — NICARDIPINE HYDROCHLORIDE 2.5 MG/HR: 0.2 INJECTION, SOLUTION INTRAVENOUS at 12:04

## 2018-04-27 RX ADMIN — OXYCODONE HYDROCHLORIDE 5 MG: 5 TABLET ORAL at 05:04

## 2018-04-27 RX ADMIN — ROCURONIUM BROMIDE 50 MG: 10 INJECTION, SOLUTION INTRAVENOUS at 12:04

## 2018-04-27 RX ADMIN — MAGNESIUM SULFATE IN WATER 2 G: 40 INJECTION, SOLUTION INTRAVENOUS at 10:04

## 2018-04-27 RX ADMIN — ACETAMINOPHEN 650 MG: 325 TABLET ORAL at 05:04

## 2018-04-27 NOTE — PROGRESS NOTES
[facility]  Neurosurgery  Progress Note    Subjective:     History of Present Illness: Caridad Ortiz is a 53 y.o. year old female with known seizure disorder,HTN,and R convexity chronic SDH s/p seizure-related fall a few weeks ago. She was discharged 2 weeks ago.She came today for follow up CTH. CT head shows evolving chronic SDH, max thicknesses 2 cm and 8 mm MLS.She was sent to the ED for neurosurgical evaluation. She repots worsening R sided headache since discharge. She denies any AMS, weakness, numbness, speech or visual difficulties or seizure.She denies any antiplatelets/anticoagulants agents    Post-Op Info:  Procedure(s) (LRB):  [id number] HOLES AND POSSIBLE CRANIOTOMY  R SIDE (Right)   Day of Surgery     Interval History: no AE. AFVSS. NPO for OR today    Medications:  Continuous Infusions:   sodium chloride 0.45% 75 mL/hr at 04/27/18 1100    nicardipine 7.5 mg/hr (04/27/18 1100)     Scheduled Meds:   lidocaine HCL 10 mg/ml (1%)  1 mL Other Once    phenytoin  100 mg Oral TID    sodium chloride 0.9%  3 mL Intravenous Q8H     PRN Meds:acetaminophen, fentaNYL, labetalol, magnesium sulfate IVPB, magnesium sulfate IVPB, ondansetron, oxyCODONE, potassium chloride in water **AND** potassium chloride in water **AND** potassium chloride in water, sodium phosphate IVPB, sodium phosphate IVPB, sodium phosphate IVPB     Review of Systems  Objective:     Weight: 57.8 kg (127 lb 6.8 oz)  Body mass index is 21.2 kg/m².  Vital Signs (Most Recent):  Temp: 99.1 °F (37.3 °C) (04/27/18 0705)  Pulse: 89 (04/27/18 1100)  Resp: (!) 25 (04/27/18 1100)  BP: 135/82 (04/27/18 1100)  SpO2: 96 % (04/27/18 1100) Vital Signs (24h Range):  Temp:  [98.3 °F (36.8 °C)-99.1 °F (37.3 °C)] 99.1 °F (37.3 °C)  Pulse:  [64-93] 89  Resp:  [15-35] 25  SpO2:  [92 %-99 %] 96 %  BP: (113-176)/(74-97) 135/82  Arterial Line BP: (148)/(78) 148/78       Date 04/27/18 0700 - 04/28/18 0659   Shift [id number] 7205-0995  2232-7843 24 Hour Total   I  N  T  A  K  E   I.V.  (mL/kg) 204.4  (3.5)   204.4  (3.5)    IV Piggyback 100   100    Shift Total  (mL/kg) 304.4  (5.3)   304.4  (5.3)   O  U  T  P  U  T   Urine  (mL/kg/hr) 150   150    Shift Total  (mL/kg) 150  (2.6)   150  (2.6)   Weight (kg) 57.8 57.8 57.8 57.8                        Physical Exam:  Nursing note and vitals reviewed.    Constitutional: She appears well-developed and well-nourished.     Abdominal: Soft.     Psych/Behavior: She is alert. She is oriented to person, place, and time.     Neurological:   GOODWIN 5/5  SILT  No drift.        Significant Labs:    Recent Labs  Lab 04/26/18 2037 04/27/18  0235 04/27/18  0731 04/27/18  1017   GLU 88 87  87  --   --    * 130*  130*  130*  130* 137 133*   K 4.0 3.6  3.6  --   --    CL 98 102  102  --   --    CO2 18* 17*  17*  --   --    BUN 9 7  7  --   --    CREATININE 0.7 0.6  0.6  --   --    CALCIUM 10.0 9.2  9.2  --   --    MG  --  1.5*  --   --        Recent Labs  Lab 04/26/18 2037 04/27/18 0235   WBC 7.87 6.53   HGB 11.9* 10.7*   HCT 36.2* 32.2*   * 546*       Recent Labs  Lab 04/26/18 2037   INR 1.0   APTT 26.1     Microbiology Results (last 7 days)     ** No results found for the last 168 hours. **        All pertinent labs from the last 24 hours have been reviewed.    Significant Diagnostics:  CT: Ct Head Without Contrast    Result Date: 4/26/2018  No detrimental changes since most recent exam. Subacute right subdural hemorrhage, with approximately 1.1 cm leftward midline shift concerning for subfalcine herniation. This report was flagged in Epic as abnormal. Electronically signed by resident: Librado Ibarra Date:    04/26/2018 Time:    21:03 Electronically signed by: Alejandro Aguilar MD Date:    04/26/2018 Time:    21:24    Ct Head Without Contrast    Result Date: 4/26/2018  Interval increased size extra-axial collection overlying the right cerebral convexity compatible with evolving subacute aged  subdural hemorrhage. This measures approximately 2.1 cm in thickness with mass effect on the right cerebral hemisphere resulting in approximately 1 cm of leftward midline shift concerning for subfalcine herniation.  Previous midline shift approximately 3 mm. No evidence for acute intracranial hemorrhage.  Clinical correlation and neuro surgical evaluation recommended. Electronically signed by: Diego Peoples DO Date:    04/26/2018 Time:    13:38    Assessment/Plan:     Seizure after head injury     53 y.o. year old female who chronic SDH with mass effect.     Neuro stable  NPO for OR  R edmond hole vs craniotomy for hematoma evacuation today  Neurochecks   -- Maintain SBP <150.  -- Continue home Dilantin and please check Dilantin level.  -- Correct Na slowly per NCC.              Zaid Dodd MD  Neurosurgery  [facility]

## 2018-04-27 NOTE — CONSULTS
"  [facility]  Adult Nutrition  Consult Note    SUMMARY     Recommendations    Recommendation/Intervention:   1. If unable to extubated pt and/or adv diet s/p sx, rec TF initiation of Isosource 1.5 @ 10ml/hr; adv as daniel'd to a goal rate of 45ml/hr to provide 1620 cals, 73 gms prot, & 840 mls free fl     2. Otherwise, ADAT s/p sx to Regular if no swallowing difficulties detected     Goals: Diet advancement or TF initiation w/in 48 hrs  Nutrition Goal Status: new  Communication of RD Recs:  (POC)    Reason for Assessment    Reason for Assessment: consult  Diagnosis: hemorrhage (chronic SDH)  Relevant Medical History: seizures, head trauma in Mar w/ SDH, HTN  Interdisciplinary Rounds: did not attend  General Information Comments: Spoke to pt this a.m. who reports she will be have sx today. MD notes reviewed re: OR plans as well.   Nutrition Discharge Planning: Unable to determine @ this time.    Nutrition Risk Screen    Nutrition Risk Screen: no indicators present    Nutrition/Diet History    Patient Reported Diet/Restrictions/Preferences: general  Food Preferences: None reported  Do you have any cultural, spiritual, Roman Catholic conflicts, given your current situation?: no  Factors Affecting Nutritional Intake: NPO    Anthropometrics    Temp: 98 °F (36.7 °C)  Height Method: Stated  Height: 5' 5" (165.1 cm)  Height (inches): 65 in  Weight Method: Bed Scale  Weight: 57.8 kg (127 lb 6.8 oz)  Weight (lb): 127.43 lb  Ideal Body Weight (IBW), Female: 125 lb  % Ideal Body Weight, Female (lb): 101.94 lb  BMI (Calculated): 21.2  BMI Grade: 18.5-24.9 - normal       Lab/Procedures/Meds    Pertinent Labs Reviewed: reviewed  Pertinent Labs Comments: Na 137, CO2 17, Alb 3.1, A1C 4.5  Pertinent Medications Reviewed: reviewed  Pertinent Medications Comments: IVF, cardene, dilantin    Physical Findings/Assessment    Overall Physical Appearance: nourished  Oral/Mouth Cavity: tooth/teeth missing  Skin: " intact    Estimated/Assessed Needs    Weight Used For Calorie Calculations: 57.8 kg (127 lb 6.8 oz)  Energy Calorie Requirements (kcal): 3721-4373  Energy Need Method: Kcal/kg (25-30 cals/kg)  Protein Requirements: 70-75 gms (1.2-1.3 gms/kg)  Weight Used For Protein Calculations: 57.8 kg (127 lb 6.8 oz)        RDA Method (mL): 1445         Nutrition Prescription Ordered    Current Diet Order: NPO    Evaluation of Received Nutrient/Fluid Intake    IV Fluid (mL):  (1/2 NS @ 75ml/hr)  Energy Calories Required: not meeting needs  Protein Required: not meeting needs  Fluid Required: meeting needs  % Intake of Estimated Energy Needs: 0 - 25 %  % Meal Intake: NPO    Nutrition Risk    Level of Risk/Frequency of Follow-up:  (F/u 2 x weekly)     Assessment and Plan    * Chronic subdural hematoma    Contributing Nutrition Diagnosis  Inadequate energy intake    Related to (etiology):   NPO for sx    Signs and Symptoms (as evidenced by):   <85% of EEN/EPN being met    Interventions/Recommendations (treatment strategy):  See recs    Nutrition Diagnosis Status:   New               Monitor and Evaluation    Food and Nutrient Intake: energy intake  Food and Nutrient Adminstration: diet order, enteral and parenteral nutrition administration  Anthropometric Measurements: weight, weight change  Biochemical Data, Medical Tests and Procedures: electrolyte and renal panel, glucose/endocrine profile  Nutrition-Focused Physical Findings: overall appearance     Nutrition Follow-Up    RD Follow-up?: Yes

## 2018-04-27 NOTE — ASSESSMENT & PLAN NOTE
- NSGY following   - Plan for OR tomorrow  - CTH- Subacute right subdural hemorrhage, with approximately 1.1 cm leftward midline shift concerning for subfalcine herniation  - SBP < 140   - NPO   - PT/OT  - 2%, NA q 6  Goal  >130

## 2018-04-27 NOTE — ANESTHESIA POSTPROCEDURE EVALUATION
"Anesthesia Post Evaluation    Patient: Caridad Ortiz    Procedure(s) Performed: Procedure(s) (LRB):  [id number] HOLES AND POSSIBLE CRANIOTOMY  R SIDE (Right)    Final Anesthesia Type: general  Patient location during evaluation: ICU  Patient participation: Yes- Able to Participate  Level of consciousness: awake and alert  Post-procedure vital signs: reviewed and stable  Pain management: adequate  Airway patency: patent  PONV status at discharge: No PONV  Anesthetic complications: no      Cardiovascular status: hemodynamically stable  Respiratory status: unassisted  Hydration status: euvolemic  Follow-up not needed.        Visit Vitals  /71   Pulse 75   Temp 36.5 °C (97.7 °F) (Axillary)   Resp 17   Ht 5' 5" (1.651 m)   Wt 57.8 kg (127 lb 6.8 oz)   SpO2 97%   Breastfeeding? No   BMI 21.20 kg/m²       Pain/Roly Score: Pain Assessment Performed: Yes (4/27/2018  3:00 PM)  Presence of Pain: non-verbal indicators absent (4/27/2018  3:00 PM)  Pain Rating Prior to Med Admin: 7 (4/27/2018  4:00 PM)      "

## 2018-04-27 NOTE — PLAN OF CARE
Problem: Patient Care Overview  Goal: Plan of Care Review  Recommendations     Recommendation/Intervention:   1. If unable to extubated pt and/or adv diet s/p sx, rec TF initiation of Isosource 1.5 @ 10ml/hr; adv as daniel'd to a goal rate of 45ml/hr to provide 1620 cals, 73 gms prot, & 840 mls free fl     2. Otherwise, ADAT s/p sx to Regular if no swallowing difficulties detected      Goals: Diet advancement or TF initiation w/in 48 hrs  Nutrition Goal Status: new

## 2018-04-27 NOTE — PROGRESS NOTES
ICU Progress Note  Neurocritical Care    Admit Date: 4/26/2018  LOS: 1    CC: Chronic subdural hematoma    Code Status: Full Code     SUBJECTIVE:     Interval History/Significant Events:   SDH  Brain compression;poa  brain herniation ; poa  Hyponatremia; poa  Malignant hypertension requiringacute intensive management  Hypokalemia;poa  Current smoker      Medications:  Continuous Infusions:   nicardipine 2.5 mg/hr (04/27/18 0910)     Scheduled Meds:   phenytoin  100 mg Oral TID    sodium chloride 0.9%  3 mL Intravenous Q8H     PRN Meds:.acetaminophen, labetalol, ondansetron    OBJECTIVE:   Vital Signs (Most Recent):   Temp: 99.1 °F (37.3 °C) (04/27/18 0705)  Pulse: 72 (04/27/18 0900)  Resp: (!) 23 (04/27/18 0900)  BP: (!) 145/81 (04/27/18 0900)  SpO2: 96 % (04/27/18 0900)    Vital Signs (24h Range):   Temp:  [98.3 °F (36.8 °C)-99.1 °F (37.3 °C)] 99.1 °F (37.3 °C)  Pulse:  [64-93] 72  Resp:  [15-35] 23  SpO2:  [92 %-99 %] 96 %  BP: (113-176)/(74-97) 145/81    ICP/CPP (Last 24h):        I & O (Last 24h):   Intake/Output Summary (Last 24 hours) at 04/27/18 0925  Last data filed at 04/27/18 0900   Gross per 24 hour   Intake           305.34 ml   Output              850 ml   Net          -544.66 ml     Physical Exam:  GA: Alert, comfortable, no acute distress.   HEENT: No scleral icterus or JVD.   Pulmonary: Clear to auscultation A/P/L. No wheezing, crackles, or rhonchi.  Cardiac: RRR S1 & S2 w/o rubs/murmurs/gallops.   Abdominal: Bowel sounds present x 4. No appreciable hepatosplenomegaly.  Skin: No jaundice, rashes, or visible lesions.  Neuro:  Awake  Alert  ox3  No deficit        Nutrition/Tube Feeds (if NPO state why):  Npo for or    Labs:  ABG: No results for input(s): PH, PO2, PCO2, HCO3, POCSATURATED, BE in the last 24 hours.  BMP:  Recent Labs  Lab 04/27/18  0235 04/27/18  0731   *  130*  130*  130* 137   K 3.6  3.6  --      102  --    CO2 17*  17*  --    BUN 7  7  --    CREATININE 0.6  0.6   --    GLU 87  87  --    MG 1.5*  --    PHOS 3.0  --      LFT: Lab Results   Component Value Date    AST 9 (L) 04/27/2018    AST 9 (L) 04/27/2018    ALT <5 (L) 04/27/2018    ALT <5 (L) 04/27/2018    ALKPHOS 91 04/27/2018    ALKPHOS 91 04/27/2018    BILITOT 0.2 04/27/2018    BILITOT 0.2 04/27/2018    ALBUMIN 3.1 (L) 04/27/2018    ALBUMIN 3.1 (L) 04/27/2018    PROT 6.3 04/27/2018    PROT 6.3 04/27/2018     CBC:   Lab Results   Component Value Date    WBC 6.53 04/27/2018    HGB 10.7 (L) 04/27/2018    HCT 32.2 (L) 04/27/2018    MCV 88 04/27/2018     (H) 04/27/2018     Microbiology x 7d:   Microbiology Results (last 7 days)     ** No results found for the last 168 hours. **        Imaging:  CT head: Subacute right subdural hemorrhage, with approximately 1.1 cm leftward midline shift concerning for subfalcine herniation.    I personally reviewed the above image.    ASSESSMENT/PLAN:     Active Hospital Problems    Diagnosis    *Chronic subdural hematoma    Brain herniation    Headache    HTN (hypertension)    Hypothyroidism    Brain compression    Seizure after head injury    Hyponatremia            Plan:  Follow exam  cardene gtt  Discussed with NS  OR within the hour    At high risk of deterioration from herniation    Critical secondary to Patient has a condition that poses threat to life and bodily function: sdh/brain herniation/ titration of cardene         Critical care was time spent personally by me on the following activities: development of treatment plan with patient or surrogate and bedside caregivers, discussions with consultants, evaluation of patient's response to treatment, examination of patient, ordering and performing treatments and interventions, ordering and review of laboratory studies, ordering and review of radiographic studies, pulse oximetry, re-evaluation of patient's condition. This critical care time did not overlap with that of any other provider or involve time for any  procedures.      Total cc time > 36 mins

## 2018-04-27 NOTE — ED NOTES
Neuro critical care called to report pt c/o headache and nausea and is requesting medication. Awaiting further orders.

## 2018-04-27 NOTE — SUBJECTIVE & OBJECTIVE
Interval History: no AE. AFVSS. NPO for OR today    Medications:  Continuous Infusions:   sodium chloride 0.45% 75 mL/hr at 04/27/18 1100    nicardipine 7.5 mg/hr (04/27/18 1100)     Scheduled Meds:   lidocaine HCL 10 mg/ml (1%)  1 mL Other Once    phenytoin  100 mg Oral TID    sodium chloride 0.9%  3 mL Intravenous Q8H     PRN Meds:acetaminophen, fentaNYL, labetalol, magnesium sulfate IVPB, magnesium sulfate IVPB, ondansetron, oxyCODONE, potassium chloride in water **AND** potassium chloride in water **AND** potassium chloride in water, sodium phosphate IVPB, sodium phosphate IVPB, sodium phosphate IVPB     Review of Systems  Objective:     Weight: 57.8 kg (127 lb 6.8 oz)  Body mass index is 21.2 kg/m².  Vital Signs (Most Recent):  Temp: 99.1 °F (37.3 °C) (04/27/18 0705)  Pulse: 89 (04/27/18 1100)  Resp: (!) 25 (04/27/18 1100)  BP: 135/82 (04/27/18 1100)  SpO2: 96 % (04/27/18 1100) Vital Signs (24h Range):  Temp:  [98.3 °F (36.8 °C)-99.1 °F (37.3 °C)] 99.1 °F (37.3 °C)  Pulse:  [64-93] 89  Resp:  [15-35] 25  SpO2:  [92 %-99 %] 96 %  BP: (113-176)/(74-97) 135/82  Arterial Line BP: (148)/(78) 148/78       Date 04/27/18 0700 - 04/28/18 0659   Shift 5580-0119 5194-5247 4229-6416 24 Hour Total   I  N  T  A  K  E   I.V.  (mL/kg) 204.4  (3.5)   204.4  (3.5)    IV Piggyback 100   100    Shift Total  (mL/kg) 304.4  (5.3)   304.4  (5.3)   O  U  T  P  U  T   Urine  (mL/kg/hr) 150   150    Shift Total  (mL/kg) 150  (2.6)   150  (2.6)   Weight (kg) 57.8 57.8 57.8 57.8                        Physical Exam:  Nursing note and vitals reviewed.    Constitutional: She appears well-developed and well-nourished.     Abdominal: Soft.     Psych/Behavior: She is alert. She is oriented to person, place, and time.     Neurological:   GOODWIN 5/5  SILT  No drift.        Significant Labs:    Recent Labs  Lab 04/26/18  2037 04/27/18  0235 04/27/18  0731 04/27/18  1017   GLU 88 87  87  --   --    * 130*  130*  130*  130* 137 133*   K  4.0 3.6  3.6  --   --    CL 98 102  102  --   --    CO2 18* 17*  17*  --   --    BUN 9 7  7  --   --    CREATININE 0.7 0.6  0.6  --   --    CALCIUM 10.0 9.2  9.2  --   --    MG  --  1.5*  --   --        Recent Labs  Lab 04/26/18 2037 04/27/18  0235   WBC 7.87 6.53   HGB 11.9* 10.7*   HCT 36.2* 32.2*   * 546*       Recent Labs  Lab 04/26/18 2037   INR 1.0   APTT 26.1     Microbiology Results (last 7 days)     ** No results found for the last 168 hours. **        All pertinent labs from the last 24 hours have been reviewed.    Significant Diagnostics:  CT: Ct Head Without Contrast    Result Date: 4/26/2018  No detrimental changes since most recent exam. Subacute right subdural hemorrhage, with approximately 1.1 cm leftward midline shift concerning for subfalcine herniation. This report was flagged in Epic as abnormal. Electronically signed by resident: Librado Ibarra Date:    04/26/2018 Time:    21:03 Electronically signed by: Alejandro Aguilar MD Date:    04/26/2018 Time:    21:24    Ct Head Without Contrast    Result Date: 4/26/2018  Interval increased size extra-axial collection overlying the right cerebral convexity compatible with evolving subacute aged subdural hemorrhage. This measures approximately 2.1 cm in thickness with mass effect on the right cerebral hemisphere resulting in approximately 1 cm of leftward midline shift concerning for subfalcine herniation.  Previous midline shift approximately 3 mm. No evidence for acute intracranial hemorrhage.  Clinical correlation and neuro surgical evaluation recommended. Electronically signed by: Diego Peoples DO Date:    04/26/2018 Time:    13:38

## 2018-04-27 NOTE — PT/OT/SLP PROGRESS
Physical Therapy      Patient Name:  Caridad Ortiz   MRN:  [id number]    PT orders acknowledged and discontinued. Pt with planned evacuation and possible craniotomy R side in AM. Re-consult post sx when medically appropriate.     ROSALINDA PINTO, PT   4/27/2018

## 2018-04-27 NOTE — ASSESSMENT & PLAN NOTE
Contributing Nutrition Diagnosis  Inadequate energy intake    Related to (etiology):   NPO for sx    Signs and Symptoms (as evidenced by):   <85% of EEN/EPN being met    Interventions/Recommendations (treatment strategy):  See recs    Nutrition Diagnosis Status:   New

## 2018-04-27 NOTE — PROGRESS NOTES
Patient arrived to [facility] from ED by via stretcher and RN.    Diagnosis: SDH    Patients current symptoms include: h/a and nausea

## 2018-04-27 NOTE — ED PROVIDER NOTES
Encounter Date: 4/26/2018    SCRIBE #1 NOTE: I, Kim Rg, am scribing for, and in the presence of,  Dr. Mckeon. I have scribed the following portions of the note - the Resident attestation.       History     Chief Complaint   Patient presents with    Abnormal Ct Scan     Patient states that she was called by her doctor today, to come in after having abnormal CT scan results. Patient states that she is scheduled for surgery tomorrow. Patient reports headache. Denies nausea, vomiting, dizziness, numbness, tingling, weakness, vision changes.      54y/o F w/ hx seizures on dilantin, recent head trauma end of March w/ R subdural, HTN, hypothyroidism sent from neurosurgery clinic for large R SDH. Patient says since discharge she has been having gradually worsening headache w/ nausea and malaise. After clinic visit today had outpatient CT scan that showed SDH had increased from 7mm to 2.1 cm w/ 1cm leftward mass effect and possible signs of subfalcine herniation. She was advised to present emergently to ED for evacuation. She denies fevers, vision changes, numbness/weakness, gait changes, vomiting, or bleeding. Not on anticoagulation. Denies any new seizures since discharge. States compliance w/ medications.          Review of patient's allergies indicates:  No Known Allergies  Past Medical History:   Diagnosis Date    Anxiety     Depression     History of psychiatric care     Hypertension     Psychiatric exam     Seizures      Past Surgical History:   Procedure Laterality Date    BACK SURGERY      TONSILLECTOMY       No family history on file.  Social History   Substance Use Topics    Smoking status: Current Every Day Smoker     Packs/day: 1.00     Years: 10.00    Smokeless tobacco: Not on file    Alcohol use 0.0 oz/week     5 - 10 Cans of beer per week     Review of Systems   Constitutional: Positive for appetite change. Negative for fever.   HENT: Negative for trouble swallowing.    Eyes: Negative  for visual disturbance.   Respiratory: Negative for shortness of breath.    Cardiovascular: Negative for chest pain.   Gastrointestinal: Positive for nausea. Negative for blood in stool and vomiting.   Genitourinary: Negative for dysuria and hematuria.   Musculoskeletal: Negative for neck pain and neck stiffness.   Skin: Negative for wound.   Allergic/Immunologic: Negative for immunocompromised state.   Neurological: Positive for headaches. Negative for dizziness, syncope, weakness and numbness.   Hematological: Does not bruise/bleed easily.   All other systems reviewed and are negative.      Physical Exam     Initial Vitals [04/26/18 1904]   BP Pulse Resp Temp SpO2   (!) 157/91 64 18 98.3 °F (36.8 °C) 97 %      MAP       113         Physical Exam    Nursing note and vitals reviewed.  Constitutional: She appears well-developed and well-nourished. She is not diaphoretic. No distress.   HENT:   Head: Normocephalic and atraumatic.   Eyes: Conjunctivae and EOM are normal. Pupils are equal, round, and reactive to light.   Neck: Normal range of motion.   Cardiovascular: Normal rate, regular rhythm, normal heart sounds and intact distal pulses.   Pulmonary/Chest: Breath sounds normal. No respiratory distress.   Abdominal: Soft. She exhibits no distension. There is no tenderness.   Musculoskeletal: She exhibits no edema or tenderness.   Neurological: She is alert and oriented to person, place, and time. No cranial nerve deficit.   GCS 15  Finger to nose normal  5/5 strength in BUE/BLE  Sensation grossly intact   Skin: Skin is warm and dry.         ED Course   Procedures  Labs Reviewed   CBC W/ AUTO DIFFERENTIAL - Abnormal; Notable for the following:        Result Value    RBC 3.93 (*)     Hemoglobin 11.9 (*)     Hematocrit 36.2 (*)     RDW 16.2 (*)     Platelets 637 (*)     Immature Granulocytes 0.8 (*)     Immature Grans (Abs) 0.06 (*)     All other components within normal limits   COMPREHENSIVE METABOLIC PANEL -  Abnormal; Notable for the following:     Sodium 127 (*)     CO2 18 (*)     ALT 5 (*)     All other components within normal limits   URINALYSIS - Abnormal; Notable for the following:     Ketones, UA 1+ (*)     All other components within normal limits    Narrative:     If patient is unable to provide a clean catch specimen due to  impairments such as mobility or cognition, obtain straight  cath specimen.   LIPID PANEL - Abnormal; Notable for the following:     Cholesterol 201 (*)     Triglycerides 153 (*)     All other components within normal limits    Narrative:     ADD ON TSH AND LIPID PER DR BEAU ANGULO/ORDER# [id number] AND   [id number] @ 22:55 4/26/18  [id number]  Add on GHGB per Beau Angulo MD @ 22:52  04/26/2018   [id number]  Add on APTT per Beau Angulo MD @ 23:00  04/26/2018    PROTIME-INR   PHENYTOIN LEVEL, TOTAL   APTT   HEMOGLOBIN A1C   LIPID PANEL   PROTIME-INR   TSH   SODIUM   HEMOGLOBIN A1C    Narrative:     ADD ON TSH AND LIPID PER DR BEAU ANGULO/ORDER# [id number] AND   [id number] @ 22:55 4/26/18  [id number]  Add on GHGB per Beau Angulo MD @ 22:52  04/26/2018   [id number]  Add on APTT per Beau Angulo MD @ 23:00  04/26/2018    TSH    Narrative:     ADD ON TSH AND LIPID PER DR BEAU ANGULO/ORDER# [id number] AND   [id number] @ 22:55 4/26/18  [id number]  Add on GHGB per Beau Angulo MD @ 22:52  04/26/2018   [id number]  Add on APTT per Beau Angulo MD @ 23:00  04/26/2018    APTT    Narrative:     ADD ON TSH AND LIPID PER DR BEAU ANGULO/ORDER# [id number] AND   [id number] @ 22:55 4/26/18  [id number]  Add on GHGB per Beau Angulo MD @ 22:52  04/26/2018   [id number]  Add on APTT per Beau Angulo MD @ 23:00  04/26/2018    PHENYTOIN LEVEL, TOTAL AND FREE   PHENYTOIN LEVEL, TOTAL   TYPE & SCREEN             Medical Decision Making:   History:   Old Medical Records: I decided to obtain old medical records.  Clinical Tests:   Lab Tests: Ordered and Reviewed  Radiological  Study: Ordered and Reviewed  Other:   I have discussed this case with another health care provider.  PGY-4 MDM: Patient presenting w/ significantly enlarged R SDH from previous injury. Neurologically intact without focal deficits. BP elevated to 180s systolic. Given findings cardene started, HOB elevated w/ close monitoring. CT repeated emergently (earlier one done approx 1pm today) and appears largely unchanged. Neurosurgery and neuro critical care consulted emergently. Patient admitted to neuro ICU. Neurosurgery has been bedside, plan for operative intervention in AM.    Vandana Foy MD  PGY-4 EM 9:55 PM 4/26/2018              Scribe Attestation:   Scribe #1: I performed the above scribed service and the documentation accurately describes the services I performed. I attest to the accuracy of the note.  Comments: I, Dr. Jelly Mckeon, personally performed the services described in this documentation. All medical record entries made by the scribe were at my direction and in my presence.  I have reviewed the chart and agree that the record reflects my personal performance and is accurate and complete. Jelly Mckeon MD.  12:26 AM 04/27/2018          As the supervising MD   I have reviewed the case with my resident and agree with the history, review of systems, physical exam, assessment and plan of care as documented by my resident.  ROS as above and as addressed on resident documentation.  All other systems reviewed and are negative.    Physical Exam:  Vitals and nursing notes reviewed:  Elevated blood pressure  NEURO:   Alert, GCS 15, CN II-XII grossly intact, normal gaze, normal vision, no facial palsy, no motor deficits,  No sensory deficits, No limb ataxia,  no aphasia, normal articulation, no neglect, no tremor, no nystagmus, negative Romberg,  nl gait/coordination  PSYCH:   no SI/HI, no anxiety, nl mood/affect, nl judgement/thought process      Labs and imaging studies reviewed and independently  Interpreted:  Hyponatremia, low bicarb, unremarkable CBC, unremarkable urinalysis, unremarkable coags.  CT head shows right subdural hematoma with midline shift.    Nicardipine ordered.  Neurosurgery consulted.    Critical care was necessary to treat or prevent imminent or life-threatening deterioration.   Critical care time: 31 min  Time spent at the bedside, reviewing test results, discussing the case with staff and/or consult(s), documenting the medical record and time spent with family members discussing treatment and management decisions.    The time involved in the performance of separately reportable procedures was not counted toward critical care time.    The results and physical exam findings were reviewed with the patient. Pt agrees with assessment, disposition and treatment plan and has no further questions or complaints at this time.    MICHELL Mckeon M.D. 9:20 PM 4/26/2018                     Clinical Impression:   The primary encounter diagnosis was Chronic subdural hematoma. Diagnoses of Headache and Hyponatremia were also pertinent to this visit.    Disposition:   Disposition: Admitted  Condition: Mariola Mckeon MD  04/27/18 0029

## 2018-04-27 NOTE — ED NOTES
Report received and care assumed. Pt updated on plan of care and verbalizes understanding. Pt is resting comfortably in stretcher with eyes open. Pt is AAOx3.  Respirations are full, even, and non labored. NAD noted. Pt reports pain to head, 7/10 with nausea. No needs verbalized at this time. Call bell is in reach, side rails are up x 2, and bed is in lowest, locked, position. Will continue to monitor.

## 2018-04-27 NOTE — H&P
[facility]  Neurocritical Care  H&P    Admit Date: 4/26/2018  Service Date: 04/26/2018  Length of Stay: 0    Subjective:     Chief Complaint: Chronic subdural hematoma    History of Present Illness: Caridad Ortiz is a  54y/o F w/ hx seizures on dilantin, recent head trauma end of March w/ R subdural, HTN, hypothyroidism  Who presents to [facility] for enlarging SDH. She was sent from neurosurgery clinic for large R SDH. Patient says since discharge she has been having gradually worsening headache w/ nausea and malaise. After clinic visit today had outpatient CT scan that showed SDH had increased from 7mm to 2.1 cm w/ 1cm leftward mass effect and possible signs of subfalcine herniation. She was advised to present emergently to ED for evacuation. She is being admitted to [facility] for a higher level of care.     Hospital Course: 4/26: Admit NCC     Interval History:  Admit [facility] SDH    Review of Systems    Review of symptoms  Constitutional: Denies fevers or chills.  Pulmonary: Denies shortness of breath or cough.  Cardiology: Denies chest pain or palpitations.  GI: Denies abdominal pain or constipation.  Neurologic: Denies new weaknessor paresthesias.    + headache   Objective:     Vitals:  Temp: 98.3 °F (36.8 °C)  Pulse: 76  BP: (!) 146/82  MAP (mmHg): 108  Resp: 15  SpO2: 98 %  O2 Device (Oxygen Therapy): room air    Temp  Min: 98.3 °F (36.8 °C)  Max: 98.5 °F (36.9 °C)  Pulse  Min: 64  Max: 80  BP  Min: 114/86  Max: 176/83  MAP (mmHg)  Min: 108  Max: 120  Resp  Min: 15  Max: 18  SpO2  Min: 97 %  Max: 99 %    No intake/output data recorded.           Physical Exam      Physical Exam:  GA: Alert, comfortable, no acute distress.   HEENT: No scleral icterus or JVD.   Pulmonary: Clear to auscultation A/L.   Cardiac: RRR S1 & S2 w/o rubs/murmurs/gallops.   Abdominal: Bowel sounds present x 4.   Skin: No jaundice, rashes, or visible lesions.  Neuro:  --GCS: E4 V5 M6  --Mental Status:  Awake alert oriented follows all  commands   --CN II-XII grossly intact.   --Pupils 3mm, PERRL.   --Corneal reflex, gag, cough intact.  --LUE strength: 5/5  --RUE strength: 5/5  --LLE strength: 5/5  --RLE strength: 5/5    Unable to test gait due to level of consciousness.    Medications:  Continuous  niCARdipine Last Rate: 2.5 mg/hr (04/26/18 2101)   Sodium Chloride 2%    Scheduled  phenytoin 100 mg TID   sodium chloride 0.9% 3 mL Q8H   PRN  labetalol 10 mg Q4H PRN   ondansetron 8 mg Q8H PRN     Today I personally reviewed pertinent medications, lines/drains/airways, imaging, lab results,     Diet  Diet NPO  Diet NPO        Assessment/Plan:     Neuro   * Chronic subdural hematoma    - NSGY following   - Plan for OR tomorrow  - CTH- Subacute right subdural hemorrhage, with approximately 1.1 cm leftward midline shift concerning for subfalcine herniation  - SBP < 140   - NPO   - PT/OT  - 2%, NA q 6  Goal  >130           Brain compression    - see SDH         Seizure after head injury    - Dilantin level   - continue home dilantin         Cardiac/Vascular   HTN (hypertension)    - cardene gtt   - SBP < 140   - EKG and Echo pending         Renal/   Hyponatremia    - NA q 6   - 2% @ 30/h  - goal >130          Endocrine   Hypothyroidism    - Tsh pending             Prophylaxis:  Venous Thromboembolism: mechanical  Stress Ulcer: None  Ventilator Pneumonia: not applicable     Activity Orders          Out of bed to chair up to 3x daily starting at 04/27 0800    Toilet out of bed or at bedside commode starting at 04/26 2104    Commode at bedside starting at 04/26 2104        Full Code    Donte Abdi NP  Neurocritical Care  [facility]

## 2018-04-27 NOTE — ED TRIAGE NOTES
Caridad Ortiz, a 53 y.o. female presents to the ED with abnormal CT results. Pt reports falling on 3/31 and having subdural hematoma. Pt had repeat CT scan today that showed enlargement of bleed. Pt reports having headache to occipital area that is throbbing and 7/10. Pt reports this headache has been consistent since d/c. Pt denies any new complaints at this time. Denies dizziness, unilateral weakness, vision changes, numbness/tingling.       Chief Complaint   Patient presents with    Abnormal Ct Scan     Patient states that she was called by her doctor today, to come in after having abnormal CT scan results. Patient states that she is scheduled for surgery tomorrow. Patient reports headache. Denies nausea, vomiting, dizziness, numbness, tingling, weakness, vision changes.      Review of patient's allergies indicates:  No Known Allergies  Past Medical History:   Diagnosis Date    Anxiety     Depression     History of psychiatric care     Hypertension     Psychiatric exam     Seizures       Adult Physical Assessment  LOC: Caridad Ortiz, 53 y.o. female verified via two identifiers.  The patient is awake, alert, oriented and speaking appropriately at this time.  APPEARANCE: Patient resting comfortably and appears to be in no acute distress at this time. Patient is clean and well groomed, patient's clothing is properly fastened.  SKIN:The skin is warm and dry, color consistent with ethnicity, patient has normal skin turgor and moist mucus membranes, skin intact, no breakdown or brusing noted.  MUSCULOSKELETAL: Patient moving all extremities well, no obvious swelling or deformities noted.  RESPIRATORY: Airway is open and patent, respirations are spontaneous, patient has a normal effort and rate, no accessory muscle use noted.  CARDIAC: Patient has a normal rate and rhythm, no periphreal edema noted in any extremity, capillary refill < 3 seconds in all extremities  ABDOMEN: Soft and non tender to palpation, no  abdominal distention noted. Bowel sounds present in all four quadrants.  NEUROLOGIC: See flowsheet.

## 2018-04-27 NOTE — ANESTHESIA PREPROCEDURE EVALUATION
Pre-operative evaluation for [id number] HOLES AND POSSIBLE CRANIOTOMY  R SIDE (Right)    Case Discussion:  Caridad Ortiz is a 53 y.o. female with HTN, Seizure Disorder, Hx of Depression with SI, and EtOH abuse who presents with worsening chronic SDH and is scheduled for above procedure.     LDA:  - 18g PIV x1    Drips:   niCARdipine 2.5 mg/hr (04/26/18 2101)    Sodium Chloride 2%         Previous Airway:  - None on file    Past Surgical History:   Procedure Laterality Date    BACK SURGERY      TONSILLECTOMY           Vital Signs Range (Last 24H):  Temp:  [36.8 °C (98.3 °F)-36.9 °C (98.5 °F)]   Pulse:  [64-80]   Resp:  [15-18]   BP: (114-176)/(82-92)   SpO2:  [97 %-99 %]       CBC:     Recent Labs  Lab 03/31/18  1046 03/31/18  1046 04/01/18  0407 04/02/18  0206 04/03/18  0108 04/04/18  0342 04/05/18  0258 04/06/18  0430 04/07/18  0444 04/16/18  0140 04/26/18  2037   WBC 14.63*  --  9.07 8.47 8.75 6.96 5.14 4.77 5.72 6.92 7.87   RBC 4.12  --  3.92* 3.49* 3.13* 3.09* 2.90* 3.24* 3.13* 3.49* 3.93*   HGB 12.3  --  11.6* 10.4* 9.3* 9.2* 8.8* 9.7* 9.3* 10.5* 11.9*   HCT 33.8* 41 32.0* 29.2* 26.8* 26.1* 24.9* 27.8* 27.2* 30.1* 36.2*     --  172 144* 203 130* 172 198 240 417* 637*   MCV 82  --  82 84 86 85 86 86 87 86 92   MCH 29.9  --  29.6 29.8 29.7 29.8 30.3 29.9 29.7 30.1 30.3   MCHC 36.4*  --  36.3* 35.6 34.7 35.2 35.3 34.9 34.2 34.9 32.9       CMP:   Recent Labs  Lab 03/31/18  1046 03/31/18  1257 03/31/18  1518 03/31/18  1831 03/31/18  2135 03/31/18  2256 04/01/18  0049 04/01/18  0407 04/01/18  1208 04/01/18  1735 04/01/18  1739 04/01/18  2034 04/02/18  0206 04/02/18  0438 04/02/18  0813 04/02/18  1209 04/02/18  1639 04/03/18  0225 04/03/18  0551 04/03/18  1248 04/03/18  1427 04/03/18  1818 04/03/18  2142 04/04/18  0032 04/04/18  0342 04/04/18  0602 04/04/18  1117 04/04/18  1459 04/04/18  1751 04/04/18  2146 04/04/18  2331 04/05/18  0258 04/05/18  0529 04/05/18  0816 04/05/18  1613  04/05/18  1947 04/06/18  0006 04/06/18  0430 04/06/18  0805 04/06/18  1211 04/06/18  1543 04/06/18  1944 04/07/18  0010 04/07/18  0444 04/07/18  0822 04/16/18  0140 04/26/18  2037   * 108* 111* 112*  112* 114* 111* 117* 119*  119* 124*  124* 127*  --  126* 127*  127* 129* 130* 127* 126* 123* 121* 117* 117* 114* 112* 112* 112*  112* 114* 113* 119* 123* 124* 124* 125*  125* 123* 121* 117*  117* 115* 115* 117*  117* 119* 122* 124* 125* 125* 125*  125* 129* 132* 127*   K 3.1*  --   --   --   --   --   --  2.6*  --   --   --   --  3.3*  --   --   --   --  4.5  --   --   --   --   --   --  3.8  --   --   --  4.6  --   --  4.6  --   --   --   --   --  4.2  --   --   --   --   --  4.2  --  4.0 4.0   CL 76*  --   --   --   --   --   --  87*  --   --   --   --  95  --   --   --   --  95  --   --   --   --   --   --  86*  --   --   --   --   --   --  97  --   --   --   --   --  88*  --   --   --   --   --  96  --  99 98   CO2 24  --   --   --   --   --   --  25  --   --   --   --  24  --   --   --   --  25  --   --   --   --   --   --  18*  --   --   --   --   --   --  23  --   --   --   --   --  21*  --   --   --   --   --  23  --  21* 18*   BUN 2*  --   --   --   --   --   --  2*  --   --   --   --  3*  --   --   --   --  5*  --   --   --   --   --   --  4*  --   --   --   --   --   --  6  --   --   --   --   --  5*  --   --   --   --   --  9  --  6 9   CREATININE 0.6  --   --   --   --   --   --  0.5  --   --   --   --  0.5  --   --   --   --  0.4*  --   --   --   --   --   --  0.4*  --   --   --   --   --   --  0.5  --   --   --   --   --  0.5  --   --   --   --   --  0.6  --  0.5 0.7   *  --   --   --   --   --   --  123*  --   --   --   --  98  --   --   --   --  96  --   --   --   --   --   --  95  --   --   --   --   --   --  97  --   --   --   --   --  86  --   --   --   --   --  101  --  90 88   MG 2.1  --   --   --   --   --   --  2.3  --   --   --   --  2.1  --   --   --   --  1.7  --   --    --   --   --   --  1.4*  --   --   --   --   --   --  1.9  --   --   --   --   --  1.5*  --   --   --   --   --  1.9  --   --   --    PHOS  --   --   --   --   --   --   --  1.1*  --   --  1.4*  --  1.4*  --   --   --   --  1.9*  --   --   --   --   --   --  2.6*  --   --   --   --   --   --  2.7  --   --   --   --   --  3.4  --   --   --   --   --  4.9*  --   --   --    CALCIUM 8.2*  --   --   --   --   --   --  7.8*  --   --   --   --  8.3*  --   --   --   --  8.1*  --   --   --   --   --   --  7.9*  --   --   --   --   --   --  8.0*  --   --   --   --   --  8.7  --   --   --   --   --  8.9  --  8.8 10.0   ALBUMIN 3.9  --   --   --   --   --   --  3.2*  --   --   --   --  3.1*  --   --   --   --  2.8*  --   --   --   --   --   --  2.7*  --   --   --   --   --   --  2.7*  --   --   --   --   --  2.9*  --   --   --   --   --  3.0*  --  2.7* 3.5   PROT 6.3  --   --   --   --   --   --  5.5*  --   --   --   --  5.4*  --   --   --   --  4.9*  --   --   --   --   --   --  4.9*  --   --   --   --   --   --  5.0*  --   --   --   --   --  5.3*  --   --   --   --   --  5.6*  --  6.5 7.4   ALKPHOS 89  --   --   --   --   --   --  74  --   --   --   --  65  --   --   --   --  55  --   --   --   --   --   --  60  --   --   --   --   --   --  53*  --   --   --   --   --  59  --   --   --   --   --  62  --  77 105   ALT 17  --   --   --   --   --   --  15  --   --   --   --  13  --   --   --   --  12  --   --   --   --   --   --  10  --   --   --   --   --   --  12  --   --   --   --   --  12  --   --   --   --   --  14  --  10 5*   AST 39  --   --   --   --   --   --  29  --   --   --   --  18  --   --   --   --  16  --   --   --   --   --   --  12  --   --   --   --   --   --  18  --   --   --   --   --  14  --   --   --   --   --  18  --  15 11   BILITOT 1.2*  --   --   --   --   --   --  0.7  --   --   --   --  0.4  --   --   --   --  0.4  --   --   --   --   --   --  0.4  --   --   --   --   --   --  0.2  --   --   --   --    --  0.2  --   --   --   --   --  0.2  --  0.3 0.2       INR:    Recent Labs  Lab 03/31/18  1049 04/01/18  0407 04/26/18  2037   INR 1.0 0.9 1.0   APTT  --  24.4  --        Diagnostic Studies:      EKG:  - NSR    2D Echo:  - Pending     Anesthesia Evaluation    I have reviewed the Patient Summary Reports.     I have reviewed the Medications.     Review of Systems  Anesthesia Hx:  No problems with previous Anesthesia  History of prior surgery of interest to airway management or planning:  Denies Personal Hx of Anesthesia complications.   Social:  Smoker    Cardiovascular:   Hypertension    Pulmonary:  Pulmonary Normal    Renal/:  Renal/ Normal     Hepatic/GI:  Hepatic/GI Normal    Neurological:   Seizures    Endocrine:   Hypothyroidism    Psych:   depression          Physical Exam  General:  Well nourished    Airway/Jaw/Neck:  Airway Findings: Mouth Opening: Small, but > 3cm Tongue: Normal  General Airway Assessment: Adult  Mallampati: III  TM Distance: Normal, at least 6 cm  Jaw/Neck Findings:  Neck ROM: Normal ROM      Dental:  Dental Findings: upper partial dentures, lower partial dentures   Chest/Lungs:  Chest/Lungs Findings: Clear to auscultation     Heart/Vascular:  Heart Findings: Rate: Normal  Heart murmur: negative       Mental Status:  Mental Status Findings:  Cooperative, Alert and Oriented         Anesthesia Plan  Type of Anesthesia, risks & benefits discussed:  Anesthesia Type:  general  Patient's Preference:   Intra-op Monitoring Plan: standard ASA monitors and arterial line  Intra-op Monitoring Plan Comments:   Post Op Pain Control Plan:   Post Op Pain Control Plan Comments:   Induction:   IV  Beta Blocker:  Patient is on a Beta-Blocker and has received one dose within the past 24 hours (No further documentation required).       Informed Consent: Patient understands risks and agrees with Anesthesia plan.  Questions answered. Anesthesia consent signed with patient.  ASA Score: 3     Day of Surgery  Review of History & Physical:    H&P update referred to the surgeon.         Ready For Surgery From Anesthesia Perspective.

## 2018-04-27 NOTE — BRIEF OP NOTE
[facility]  Brief Operative Note    SUMMARY     Surgery Date: 4/27/2018     Surgeon(s) and Role:     * Negro Morataya MD - Primary    Assisting Surgeon: CANELO Jain MD (Resident in neurosurgery)    Pre-op Diagnosis:  Chronic subdural hematoma [I62.03]    Post-op Diagnosis:  Post-Op Diagnosis Codes:     * Chronic subdural hematoma [I62.03]    Procedure(s) (LRB):  [id number] HOLES AND POSSIBLE CRANIOTOMY  R SIDE (Right)    Anesthesia: General    Description of Procedure: [place] hole evacuation SDH    Description of the findings of the procedure: Motor oil fluid, JESSICA drain left.    Estimated Blood Loss: * 100 ml.       Specimens:   Specimen (12h ago through future)    None

## 2018-04-27 NOTE — PT/OT/SLP PROGRESS
Speech Language Pathology  Pt not seen    Caridad Ortiz  MRN: [id number]    Patient not seen today secondary to pt going to OR for [facility] on this date. Please re-consult post op.      Florencia Gaviria CCC-SLP   4/27/2018

## 2018-04-27 NOTE — SUBJECTIVE & OBJECTIVE
Interval History:  Admit NCC [facility]    Review of Systems    Review of symptoms  Constitutional: Denies fevers or chills.  Pulmonary: Denies shortness of breath or cough.  Cardiology: Denies chest pain or palpitations.  GI: Denies abdominal pain or constipation.  Neurologic: Denies new weaknessor paresthesias.    + headache   Objective:     Vitals:  Temp: 98.3 °F (36.8 °C)  Pulse: 76  BP: (!) 146/82  MAP (mmHg): 108  Resp: 15  SpO2: 98 %  O2 Device (Oxygen Therapy): room air    Temp  Min: 98.3 °F (36.8 °C)  Max: 98.5 °F (36.9 °C)  Pulse  Min: 64  Max: 80  BP  Min: 114/86  Max: 176/83  MAP (mmHg)  Min: 108  Max: 120  Resp  Min: 15  Max: 18  SpO2  Min: 97 %  Max: 99 %    No intake/output data recorded.           Physical Exam      Physical Exam:  GA: Alert, comfortable, no acute distress.   HEENT: No scleral icterus or JVD.   Pulmonary: Clear to auscultation A/L.   Cardiac: RRR S1 & S2 w/o rubs/murmurs/gallops.   Abdominal: Bowel sounds present x 4.   Skin: No jaundice, rashes, or visible lesions.  Neuro:  --GCS: E4 V5 M6  --Mental Status:  Awake alert oriented follows all commands   --CN II-XII grossly intact.   --Pupils 3mm, PERRL.   --Corneal reflex, gag, cough intact.  --LUE strength: 5/5  --RUE strength: 5/5  --LLE strength: 5/5  --RLE strength: 5/5    Unable to test gait due to level of consciousness.    Medications:  Continuous  niCARdipine Last Rate: 2.5 mg/hr (04/26/18 2101)   Sodium Chloride 2%    Scheduled  phenytoin 100 mg TID   sodium chloride 0.9% 3 mL Q8H   PRN  labetalol 10 mg Q4H PRN   ondansetron 8 mg Q8H PRN     Today I personally reviewed pertinent medications, lines/drains/airways, imaging, lab results,     Diet  Diet NPO  Diet NPO

## 2018-04-27 NOTE — PLAN OF CARE
Problem: Patient Care Overview  Goal: Plan of Care Review  Outcome: Ongoing (interventions implemented as appropriate)  POC reviewed with pt at 0900. Pt verbalized understanding. Questions and concerns addressed. OR for crani evac this afternoon, subdural drain to thumbprint suction.    Pain controlled with prn fentanyl and oxycodone.  Clear liquid diet AAT.  Pt progressing toward goals. Will continue to monitor. See flowsheets for full assessment and VS info.

## 2018-04-27 NOTE — PROGRESS NOTES
Pt transported to OR for evac, consents signed in chart, chart sent with pt.  OR RN and Dr. Jain met at OR elevators to assume care of pt.

## 2018-04-27 NOTE — TRANSFER OF CARE
"Anesthesia Transfer of Care Note    Patient: Caridad Ortiz    Procedure(s) Performed: Procedure(s) (LRB):  [id number]-RVAI HOLES AND POSSIBLE CRANIOTOMY  R SIDE (Right)    Patient location: PACU    Anesthesia Type: general    Transport from OR: Transported from OR on room air with adequate spontaneous ventilation. Transported from OR on 6-10 L/min O2 by face mask with adequate spontaneous ventilation. Continuous SpO2 monitoring in transport. Continuous ECG monitoring in transport. Continuos invasive BP monitoring in transport    Post pain: adequate analgesia    Post assessment: no apparent anesthetic complications and tolerated procedure well    Post vital signs: stable    Level of consciousness: awake    Nausea/Vomiting: no nausea/vomiting    Complications: none    Transfer of care protocol was followed      Last vitals:   Visit Vitals  /75   Pulse 79   Temp 36.7 °C (98 °F) (Oral)   Resp (!) 22   Ht 5' 5" (1.651 m)   Wt 57.8 kg (127 lb 6.8 oz)   SpO2 (!) 94%   Breastfeeding? No   BMI 21.20 kg/m²     "

## 2018-04-27 NOTE — ASSESSMENT & PLAN NOTE
53 y.o. year old female who chronic SDH with mass effect.     Neuro stable  NPO for OR  R edmond hole vs craniotomy for hematoma evacuation today  Neurochecks   -- Maintain SBP <150.  -- Continue home Dilantin and please check Dilantin level.  -- Correct Na slowly per NCC.

## 2018-04-27 NOTE — PROCEDURES
"Caridad Ortiz is a 53 y.o. female patient.    Temp: 99.1 °F (37.3 °C) (04/27/18 0705)  Pulse: 79 (04/27/18 1000)  Resp: 18 (04/27/18 1000)  BP: 130/79 (04/27/18 1000)  SpO2: 95 % (04/27/18 1000)  Weight: 57.8 kg (127 lb 6.8 oz) (04/27/18 0146)  Height: 5' 5" (165.1 cm) (04/27/18 0146)       Arterial Line  Date/Time: 4/27/2018 10:59 AM  Performed by: RAUL MCKEE.  Authorized by: RAUL MCKEE   Preparation: Patient was prepped and draped in the usual sterile fashion.  Indications: multiple ABGs and hemodynamic monitoring  Location: right radial  Patient sedated: no  Chan's test normal: yes  Needle gauge: 20  Seldinger technique: Seldinger technique used  Number of attempts: 2  Complications: No  Specimens: No  Post-procedure: dressing applied  Post-procedure CMS: normal  Patient tolerance: Patient tolerated the procedure well with no immediate complications          Raul Mckee  4/27/2018    "

## 2018-04-27 NOTE — CONSULTS
Neurosurgery consult note    04/26/2018      Consult Requested By: ED  Reason for Consult: Chronic SDH    SUBJECTIVE:   CC:  Worsening COSTELLO    HPI: Caridad Ortiz is a 53 y.o. year old female with known seizure disorder,HTN,and R convexity chronic SDH s/p seizure-related fall a few weeks ago. She was discharged 2 weeks ago.She came today for follow up CTH. CT head shows evolving chronic SDH, max thicknesses 2 cm and 8 mm MLS.She was sent to the ED for neurosurgical evaluation. She repots worsening R sided headache since discharge. She denies any AMS, weakness, numbness, speech or visual difficulties or seizure.She denies any antiplatelets/anticoagulants agents.       Active problems:  Patient Active Problem List   Diagnosis    Suicidal ideation    Adjustment disorder with depressed mood    Alcohol abuse    Acute subdural hematoma    Hyponatremia    Alcohol withdrawal syndrome with complication    Seizure after head injury    Brain compression    Agitation requiring sedation protocol    Acute respiratory failure with hypoxia    Sepsis    Hypothyroidism    HTN (hypertension)    Intracranial hemorrhage    Headache    Chronic subdural hematoma       ROS: negative except as above per HPI    PMHx:  Past Medical History:   Diagnosis Date    Anxiety     Depression     History of psychiatric care     Hypertension     Psychiatric exam     Seizures        PSHx:   Past Surgical History:   Procedure Laterality Date    BACK SURGERY      TONSILLECTOMY         Social Hx:  Social History     Social History    Marital status:      Spouse name: N/A    Number of children: 1    Years of education: N/A     Occupational History    Not on file.     Social History Main Topics    Smoking status: Current Every Day Smoker     Packs/day: 1.00     Years: 10.00    Smokeless tobacco: Not on file    Alcohol use 0.0 oz/week     5 - 10 Cans of beer per week    Drug use: No    Sexual activity: No     Other Topics  Concern    Patient Feels They Ought To Cut Down On Drinking/Drug Use No    Patient Annoyed By Others Criticizing Their Drinking/Drug Use No    Patient Has Felt Bad Or Guilty About Drinking/Drug Use No    Patient Has Had A Drink/Used Drugs As An Eye Opener In The Am No     Social History Narrative    No narrative on file        reports that she has been smoking.  She has a 10.00 pack-year smoking history. She does not have any smokeless tobacco history on file. She reports that she drinks alcohol. She reports that she does not use drugs.      Allergy:  Review of patient's allergies indicates:  No Known Allergies      Home Medication:  No current facility-administered medications on file prior to encounter.      Current Outpatient Prescriptions on File Prior to Encounter   Medication Sig Dispense Refill    acetaminophen (TYLENOL) 325 MG tablet Take 2 tablets (650 mg total) by mouth every 6 (six) hours as needed.  0    amLODIPine (NORVASC) 10 MG tablet Take 10 mg by mouth once daily.      busPIRone (BUSPAR) 15 MG tablet Take 15 mg by mouth 2 (two) times daily.      calcium-vitamin D3 500 mg(1,250mg) -200 unit per tablet Take 1 tablet by mouth 2 (two) times daily with meals.      citalopram (CELEXA) 20 MG tablet Take 1 tablet (20 mg total) by mouth once daily. 30 tablet 1    folic acid (FOLVITE) 1 MG tablet Take 1 tablet (1 mg total) by mouth once daily.  0    levothyroxine (SYNTHROID) 75 MCG tablet Take 75 mcg by mouth once daily.      lisinopril 10 MG tablet Take 1 tablet (10 mg total) by mouth once daily. 30 tablet 1    metoprolol tartrate (LOPRESSOR) 25 MG tablet Take 50 mg by mouth 2 (two) times daily.       mirtazapine (REMERON) 30 MG tablet Take 1 tablet (30 mg total) by mouth every evening. 30 tablet 1    nicotine (NICODERM CQ) 21 mg/24 hr Place 1 patch onto the skin once daily.  0    oxyCODONE-acetaminophen (PERCOCET)  mg per tablet Take 1 tablet by mouth every 4 to 6 hours as needed for  Pain. 75 tablet 0    phenytoin (DILANTIN) 100 MG ER capsule Take by mouth 3 (three) times daily.      thiamine 100 MG tablet Take 1 tablet (100 mg total) by mouth once daily.      tiZANidine (ZANAFLEX) 4 MG tablet Take 4 mg by mouth 2 (two) times daily.           Scheduled Meds:   phenytoin  100 mg Oral TID    sodium chloride 0.9%  3 mL Intravenous Q8H       Continuous Infusions:   niCARdipine 2.5 mg/hr (04/26/18 2101)    Sodium Chloride 2%         PRN Meds:  labetalol, ondansetron    OBJECTIVE:     Vital Signs (Most Recent)  Temp: 98.3 °F (36.8 °C) (04/26/18 1904)  Pulse: 76 (04/26/18 2131)  Resp: 15 (04/26/18 2116)  BP: (!) 146/82 (04/26/18 2131)  SpO2: 98 % (04/26/18 2131)      Vital Signs Range (Last 24H):  Temp:  [98.3 °F (36.8 °C)-98.5 °F (36.9 °C)]   Pulse:  [64-80]   Resp:  [15-18]   BP: (114-176)/(82-92)   SpO2:  [97 %-99 %]       I/O:  No intake or output data in the 24 hours ending 04/26/18 2227      Physical exam:   General: appears well-developed and well-nourished, no distress  Eyes: EOMI, PERRLA, normal visual acuity and VF  Cardiovascular: RRR, S1+S2, no M/G/R  Pulm: NWOB, no distress   Abdominal: soft, ND, NT, +BS  MSK: moves all extremities spontaneously with good tone  Neurological: AAOX3, no drift, GCS E4V5M6, CN II-XII grossly intact and face symm, normal speech, following simple commands, GOODWIN, full strength throughout, SILT, finger to nose normal       Laboratory:    Recent Results (from the past 24 hour(s))   CBC auto differential    Collection Time: 04/26/18  8:37 PM   Result Value Ref Range    WBC 7.87 3.90 - 12.70 K/uL    RBC 3.93 (L) 4.00 - 5.40 M/uL    Hemoglobin 11.9 (L) 12.0 - 16.0 g/dL    Hematocrit 36.2 (L) 37.0 - 48.5 %    MCV 92 82 - 98 fL    MCH 30.3 27.0 - 31.0 pg    MCHC 32.9 32.0 - 36.0 g/dL    RDW 16.2 (H) 11.5 - 14.5 %    Platelets 637 (H) 150 - 350 K/uL    MPV 9.3 9.2 - 12.9 fL    Immature Granulocytes 0.8 (H) 0.0 - 0.5 %    Gran # (ANC) 5.2 1.8 - 7.7 K/uL    Immature  Grans (Abs) 0.06 (H) 0.00 - 0.04 K/uL    Lymph # 2.1 1.0 - 4.8 K/uL    Mono # 0.4 0.3 - 1.0 K/uL    Eos # 0.0 0.0 - 0.5 K/uL    Baso # 0.10 0.00 - 0.20 K/uL    nRBC 0 0 /100 WBC    Gran% 65.3 38.0 - 73.0 %    Lymph% 26.8 18.0 - 48.0 %    Mono% 5.5 4.0 - 15.0 %    Eosinophil% 0.3 0.0 - 8.0 %    Basophil% 1.3 0.0 - 1.9 %    Differential Method Automated    Comprehensive metabolic panel    Collection Time: 04/26/18  8:37 PM   Result Value Ref Range    Sodium 127 (L) 136 - 145 mmol/L    Potassium 4.0 3.5 - 5.1 mmol/L    Chloride 98 95 - 110 mmol/L    CO2 18 (L) 23 - 29 mmol/L    Glucose 88 70 - 110 mg/dL    BUN, Bld 9 6 - 20 mg/dL    Creatinine 0.7 0.5 - 1.4 mg/dL    Calcium 10.0 8.7 - 10.5 mg/dL    Total Protein 7.4 6.0 - 8.4 g/dL    Albumin 3.5 3.5 - 5.2 g/dL    Total Bilirubin 0.2 0.1 - 1.0 mg/dL    Alkaline Phosphatase 105 55 - 135 U/L    AST 11 10 - 40 U/L    ALT 5 (L) 10 - 44 U/L    Anion Gap 11 8 - 16 mmol/L    eGFR if African American >60.0 >60 mL/min/1.73 m^2    eGFR if non African American >60.0 >60 mL/min/1.73 m^2   Protime-INR    Collection Time: 04/26/18  8:37 PM   Result Value Ref Range    Prothrombin Time 10.2 9.0 - 12.5 sec    INR 1.0 0.8 - 1.2       Diagnostic Results:  CT head reviewed personally      ASSESSMENT/PLAN:   Caridad Ortiz is a 53 y.o. year old female who presents with evolving chronic SDH with mass effect. She is intact on exam.    -- Admit to neuro ICU, neurocritical care service  -- Booked and consented for R edmond hole vs craniotomy for hematoma evacuation tomorrow AM.  -- Neurochecks q1h.  -- If exam decline please call neurosurgery on-call immediatly   -- Maintain SBP <150.  -- Continue home Dilantin and please check Dilantin level.  -- Correct Na slowly per NCC.  -- Labs: CBC, BMP,Coags and type/screen.  -- No repeat scan needed unless changed in exam.  -- Keep NPO at midnight and start mIVF.  -- Tylenol as needed for HA.    Discussed with attending staff Dr.Kalyvas Jewell  Supa Machado MD  04/26/2018  10:27 PM

## 2018-04-27 NOTE — PT/OT/SLP PROGRESS
Occupational Therapy Discontinued      Patient Name:  Caridad Ortiz   MRN:  [id number]    OT orders discontinued. Pt planned for OR for [facility] Holes on this date.    LUCY Lopez  4/27/2018

## 2018-04-27 NOTE — HOSPITAL COURSE
4/26: Admit NCC   4/30: No events. SD drain was D/C today. CT heard in the AM. Still some headache.  5/1: No events.  CT head pending. Still  some headache and serum Na 132  5/2: No events.  CT head mild residual R-SDH and pneumocephalus. BP on the lower side. Serum Na 134-133

## 2018-04-27 NOTE — PLAN OF CARE
Problem: Patient Care Overview  Goal: Plan of Care Review  Outcome: Ongoing (interventions implemented as appropriate)  POC reviewed with pt at 0400. Pt verbalized understanding. Questions and concerns addressed. Pt awaiting surgery this morning. No acute events overnight. On 2% saline gtt. C/o HA and nausea. Oriented x 4. Pt progressing toward goals. Will continue to monitor. See flowsheets for full assessment and VS info

## 2018-04-27 NOTE — PLAN OF CARE
[facility] - [place], [place] - [address]  [address]  [place] [address]  Phone: [phone] Fax: [phone]    [facility] - [place], [place] - [address]  [address]  [place] [address]  Phone: [phone] Fax: [phone]    This CM unable to do face to face assessment patient in OR, no family at bedside.    Michelle Johnson RN/BSN/CM  [phone]  [facility]

## 2018-04-28 LAB
ALBUMIN SERPL BCP-MCNC: 2.8 G/DL
ALP SERPL-CCNC: 81 U/L
ALT SERPL W/O P-5'-P-CCNC: <5 U/L
ANION GAP SERPL CALC-SCNC: 8 MMOL/L
AST SERPL-CCNC: 9 U/L
BASOPHILS # BLD AUTO: 0.03 K/UL
BASOPHILS NFR BLD: 0.3 %
BILIRUB SERPL-MCNC: 0.1 MG/DL
BUN SERPL-MCNC: 2 MG/DL
CALCIUM SERPL-MCNC: 8.2 MG/DL
CHLORIDE SERPL-SCNC: 102 MMOL/L
CO2 SERPL-SCNC: 21 MMOL/L
CREAT SERPL-MCNC: 0.5 MG/DL
DIFFERENTIAL METHOD: ABNORMAL
EOSINOPHIL # BLD AUTO: 0 K/UL
EOSINOPHIL NFR BLD: 0.1 %
ERYTHROCYTE [DISTWIDTH] IN BLOOD BY AUTOMATED COUNT: 15.9 %
EST. GFR  (AFRICAN AMERICAN): >60 ML/MIN/1.73 M^2
EST. GFR  (NON AFRICAN AMERICAN): >60 ML/MIN/1.73 M^2
GLUCOSE SERPL-MCNC: 101 MG/DL
HCT VFR BLD AUTO: 28.2 %
HGB BLD-MCNC: 9.6 G/DL
IMM GRANULOCYTES # BLD AUTO: 0.05 K/UL
IMM GRANULOCYTES NFR BLD AUTO: 0.5 %
LYMPHOCYTES # BLD AUTO: 2 K/UL
LYMPHOCYTES NFR BLD: 21.9 %
MCH RBC QN AUTO: 30.2 PG
MCHC RBC AUTO-ENTMCNC: 34 G/DL
MCV RBC AUTO: 89 FL
MONOCYTES # BLD AUTO: 0.7 K/UL
MONOCYTES NFR BLD: 8 %
NEUTROPHILS # BLD AUTO: 6.4 K/UL
NEUTROPHILS NFR BLD: 69.2 %
NRBC BLD-RTO: 0 /100 WBC
PHENYTOIN FREE SERPL-MCNC: <0.8 MCG/ML
PHENYTOIN, TOTAL: 4.5 MCG/ML
PLATELET # BLD AUTO: 476 K/UL
PMV BLD AUTO: 8.8 FL
POTASSIUM SERPL-SCNC: 3.7 MMOL/L
PROT SERPL-MCNC: 5.7 G/DL
RBC # BLD AUTO: 3.18 M/UL
SODIUM SERPL-SCNC: 131 MMOL/L
SODIUM SERPL-SCNC: 132 MMOL/L
WBC # BLD AUTO: 9.21 K/UL

## 2018-04-28 PROCEDURE — 25000003 PHARM REV CODE 250: Performed by: NURSE PRACTITIONER

## 2018-04-28 PROCEDURE — 63600175 PHARM REV CODE 636 W HCPCS: Performed by: NURSE PRACTITIONER

## 2018-04-28 PROCEDURE — 84295 ASSAY OF SERUM SODIUM: CPT | Mod: 91

## 2018-04-28 PROCEDURE — 63600175 PHARM REV CODE 636 W HCPCS: Performed by: STUDENT IN AN ORGANIZED HEALTH CARE EDUCATION/TRAINING PROGRAM

## 2018-04-28 PROCEDURE — 94761 N-INVAS EAR/PLS OXIMETRY MLT: CPT

## 2018-04-28 PROCEDURE — 85025 COMPLETE CBC W/AUTO DIFF WBC: CPT

## 2018-04-28 PROCEDURE — 80053 COMPREHEN METABOLIC PANEL: CPT

## 2018-04-28 PROCEDURE — 25000003 PHARM REV CODE 250: Performed by: ANESTHESIOLOGY

## 2018-04-28 PROCEDURE — S4991 NICOTINE PATCH NONLEGEND: HCPCS | Performed by: STUDENT IN AN ORGANIZED HEALTH CARE EDUCATION/TRAINING PROGRAM

## 2018-04-28 PROCEDURE — 99233 SBSQ HOSP IP/OBS HIGH 50: CPT | Mod: ,,, | Performed by: ANESTHESIOLOGY

## 2018-04-28 PROCEDURE — 20000000 HC ICU ROOM

## 2018-04-28 PROCEDURE — 25000003 PHARM REV CODE 250: Performed by: STUDENT IN AN ORGANIZED HEALTH CARE EDUCATION/TRAINING PROGRAM

## 2018-04-28 PROCEDURE — A4216 STERILE WATER/SALINE, 10 ML: HCPCS | Performed by: NURSE PRACTITIONER

## 2018-04-28 RX ORDER — CEFAZOLIN SODIUM 1 G/3ML
1 INJECTION, POWDER, FOR SOLUTION INTRAMUSCULAR; INTRAVENOUS
Status: DISCONTINUED | OUTPATIENT
Start: 2018-04-28 | End: 2018-04-30

## 2018-04-28 RX ORDER — FENTANYL CITRATE 50 UG/ML
25 INJECTION, SOLUTION INTRAMUSCULAR; INTRAVENOUS
Status: DISCONTINUED | OUTPATIENT
Start: 2018-04-28 | End: 2018-04-30

## 2018-04-28 RX ORDER — GABAPENTIN 800 MG/1
800 TABLET ORAL 2 TIMES DAILY
COMMUNITY
End: 2018-05-03 | Stop reason: SDUPTHER

## 2018-04-28 RX ORDER — IBUPROFEN 200 MG
1 TABLET ORAL DAILY
Status: DISCONTINUED | OUTPATIENT
Start: 2018-04-28 | End: 2018-04-28

## 2018-04-28 RX ORDER — METOPROLOL TARTRATE 1 MG/ML
5 INJECTION, SOLUTION INTRAVENOUS ONCE
Status: DISCONTINUED | OUTPATIENT
Start: 2018-04-28 | End: 2018-04-30

## 2018-04-28 RX ORDER — IBUPROFEN 200 MG
1 TABLET ORAL DAILY
Status: DISCONTINUED | OUTPATIENT
Start: 2018-04-28 | End: 2018-05-03 | Stop reason: HOSPADM

## 2018-04-28 RX ORDER — METOPROLOL TARTRATE 50 MG/1
50 TABLET ORAL EVERY 8 HOURS
Status: DISCONTINUED | OUTPATIENT
Start: 2018-04-28 | End: 2018-04-29

## 2018-04-28 RX ADMIN — GABAPENTIN 800 MG: 100 CAPSULE ORAL at 08:04

## 2018-04-28 RX ADMIN — FENTANYL CITRATE 12.5 MCG: 50 INJECTION, SOLUTION INTRAMUSCULAR; INTRAVENOUS at 02:04

## 2018-04-28 RX ADMIN — FENTANYL CITRATE 12.5 MCG: 50 INJECTION, SOLUTION INTRAMUSCULAR; INTRAVENOUS at 06:04

## 2018-04-28 RX ADMIN — NICARDIPINE HYDROCHLORIDE 5 MG/HR: 0.2 INJECTION, SOLUTION INTRAVENOUS at 10:04

## 2018-04-28 RX ADMIN — OXYCODONE HYDROCHLORIDE 5 MG: 5 TABLET ORAL at 11:04

## 2018-04-28 RX ADMIN — NICARDIPINE HYDROCHLORIDE 2.5 MG/HR: 0.2 INJECTION, SOLUTION INTRAVENOUS at 03:04

## 2018-04-28 RX ADMIN — METOPROLOL TARTRATE 50 MG: 50 TABLET ORAL at 09:04

## 2018-04-28 RX ADMIN — PHENYTOIN SODIUM 100 MG: 100 CAPSULE ORAL at 08:04

## 2018-04-28 RX ADMIN — FENTANYL CITRATE 25 MCG: 50 INJECTION, SOLUTION INTRAMUSCULAR; INTRAVENOUS at 01:04

## 2018-04-28 RX ADMIN — NICOTINE 1 PATCH: 14 PATCH, EXTENDED RELEASE TRANSDERMAL at 11:04

## 2018-04-28 RX ADMIN — NICARDIPINE HYDROCHLORIDE 2.5 MG/HR: 0.2 INJECTION, SOLUTION INTRAVENOUS at 06:04

## 2018-04-28 RX ADMIN — PHENYTOIN SODIUM 100 MG: 100 CAPSULE ORAL at 03:04

## 2018-04-28 RX ADMIN — Medication 3 ML: at 05:04

## 2018-04-28 RX ADMIN — FENTANYL CITRATE 25 MCG: 50 INJECTION, SOLUTION INTRAMUSCULAR; INTRAVENOUS at 09:04

## 2018-04-28 RX ADMIN — FENTANYL CITRATE 25 MCG: 50 INJECTION, SOLUTION INTRAMUSCULAR; INTRAVENOUS at 11:04

## 2018-04-28 RX ADMIN — LEVOTHYROXINE SODIUM 75 MCG: 75 TABLET ORAL at 05:04

## 2018-04-28 RX ADMIN — NICOTINE 1 PATCH: 21 PATCH, EXTENDED RELEASE TRANSDERMAL at 12:04

## 2018-04-28 RX ADMIN — SODIUM CHLORIDE: 0.9 INJECTION, SOLUTION INTRAVENOUS at 11:04

## 2018-04-28 RX ADMIN — FENTANYL CITRATE 12.5 MCG: 50 INJECTION, SOLUTION INTRAMUSCULAR; INTRAVENOUS at 12:04

## 2018-04-28 RX ADMIN — METOPROLOL TARTRATE 50 MG: 50 TABLET ORAL at 01:04

## 2018-04-28 RX ADMIN — FENTANYL CITRATE 12.5 MCG: 50 INJECTION, SOLUTION INTRAMUSCULAR; INTRAVENOUS at 08:04

## 2018-04-28 RX ADMIN — CEFAZOLIN 1 G: 330 INJECTION, POWDER, FOR SOLUTION INTRAMUSCULAR; INTRAVENOUS at 07:04

## 2018-04-28 RX ADMIN — ONDANSETRON 8 MG: 8 TABLET, ORALLY DISINTEGRATING ORAL at 04:04

## 2018-04-28 RX ADMIN — NICARDIPINE HYDROCHLORIDE 7.5 MG/HR: 0.2 INJECTION, SOLUTION INTRAVENOUS at 04:04

## 2018-04-28 RX ADMIN — OXYCODONE HYDROCHLORIDE 5 MG: 5 TABLET ORAL at 05:04

## 2018-04-28 RX ADMIN — FENTANYL CITRATE 25 MCG: 50 INJECTION, SOLUTION INTRAMUSCULAR; INTRAVENOUS at 07:04

## 2018-04-28 RX ADMIN — FENTANYL CITRATE 25 MCG: 50 INJECTION, SOLUTION INTRAMUSCULAR; INTRAVENOUS at 05:04

## 2018-04-28 RX ADMIN — Medication 3 ML: at 02:04

## 2018-04-28 RX ADMIN — CEFAZOLIN 1 G: 330 INJECTION, POWDER, FOR SOLUTION INTRAMUSCULAR; INTRAVENOUS at 11:04

## 2018-04-28 RX ADMIN — FENTANYL CITRATE 12.5 MCG: 50 INJECTION, SOLUTION INTRAMUSCULAR; INTRAVENOUS at 11:04

## 2018-04-28 RX ADMIN — OXYCODONE HYDROCHLORIDE 5 MG: 5 TABLET ORAL at 06:04

## 2018-04-28 RX ADMIN — FENTANYL CITRATE 12.5 MCG: 50 INJECTION, SOLUTION INTRAMUSCULAR; INTRAVENOUS at 04:04

## 2018-04-28 RX ADMIN — POTASSIUM CHLORIDE 40 MEQ: 7.46 INJECTION, SOLUTION INTRAVENOUS at 08:04

## 2018-04-28 RX ADMIN — Medication 3 ML: at 09:04

## 2018-04-28 RX ADMIN — FENTANYL CITRATE 25 MCG: 50 INJECTION, SOLUTION INTRAMUSCULAR; INTRAVENOUS at 03:04

## 2018-04-28 RX ADMIN — ONDANSETRON 8 MG: 8 TABLET, ORALLY DISINTEGRATING ORAL at 08:04

## 2018-04-28 NOTE — PLAN OF CARE
Problem: Patient Care Overview  Goal: Plan of Care Review  Outcome: Ongoing (interventions implemented as appropriate)  POC reviewed with pt. Pt verbalized understanding. Questions and concerns addressed. Pt progressing towards goals. Cardene gtt continued. Subdural drain to thumbprint suction. Pain controlled with PRN medications. No acute events. Will continue to monitor. Refer to flowsheets for full assessment and VS info.

## 2018-04-28 NOTE — SUBJECTIVE & OBJECTIVE
Interval History: To OR yesterday for edmond hole evac of chronic SDH without incident. NAEON.     Medications:  Continuous Infusions:   sodium chloride 0.9% 50 mL/hr at 04/28/18 1000    nicardipine 5 mg/hr (04/28/18 1000)     Scheduled Meds:   levothyroxine  75 mcg Oral Before breakfast    lidocaine HCL 10 mg/ml (1%)  1 mL Other Once    phenytoin  100 mg Oral TID    sodium chloride 0.9%  3 mL Intravenous Q8H     PRN Meds:acetaminophen, fentaNYL, labetalol, magnesium sulfate IVPB, magnesium sulfate IVPB, ondansetron, oxyCODONE, potassium chloride in water **AND** potassium chloride in water **AND** potassium chloride in water, sodium phosphate IVPB, sodium phosphate IVPB, sodium phosphate IVPB     Review of Systems  Objective:     Weight: 57.8 kg (127 lb 6.8 oz)  Body mass index is 21.2 kg/m².  Vital Signs (Most Recent):  Temp: 99.1 °F (37.3 °C) (04/28/18 0701)  Pulse: 78 (04/28/18 1001)  Resp: 16 (04/28/18 1001)  BP: 128/83 (04/28/18 1001)  SpO2: (!) 94 % (04/28/18 1001) Vital Signs (24h Range):  Temp:  [97.1 °F (36.2 °C)-99.1 °F (37.3 °C)] 99.1 °F (37.3 °C)  Pulse:  [73-92] 78  Resp:  [16-65] 16  SpO2:  [93 %-100 %] 94 %  BP: (116-138)/(70-86) 128/83  Arterial Line BP: (119-155)/(62-78) 140/71       Date 04/28/18 0700 - 04/29/18 0659   Shift 2613-6466 9086-3701 9392-5911 24 Hour Total   I  N  T  A  K  E   I.V.  (mL/kg) 318.8  (5.5)   318.8  (5.5)    IV Piggyback 400   400    Shift Total  (mL/kg) 718.8  (12.4)   718.8  (12.4)   O  U  T  P  U  T   Urine  (mL/kg/hr) 700   700    Drains 50   50    Shift Total  (mL/kg) 750  (13)   750  (13)   Weight (kg) 57.8 57.8 57.8 57.8            Closed/Suction Drain 04/27/18 1344 Right;Lateral Scalp Bulb 7 Fr. (Active)   Site Description Unable to view 4/28/2018  7:01 AM   Dressing Type [place] 4/28/2018  7:01 AM   Dressing Status Old drainage 4/28/2018  7:01 AM   Drainage Serosanguineous 4/28/2018  7:01 AM   Status Other (Comment) 4/28/2018  7:01 AM   Output (mL) 50 mL  "4/28/2018  8:29 AM            Urethral Catheter 04/27/18 1254 Non-latex;Straight-tip 16 Fr. (Active)   Site Assessment Clean;Intact 4/28/2018  7:01 AM   Collection Container Urimeter 4/28/2018  7:01 AM   Securement Method secured to top of thigh w/ adhesive device 4/28/2018  7:01 AM   Catheter Care Performed yes 4/28/2018  7:01 AM   Reason for Continuing Urinary Catheterization Post operative 4/28/2018  7:01 AM   CAUTI Prevention Bundle StatLock in place w 1" slack;Intact seal between catheter & drainage tubing;Drainage bag off the floor;Green sheeting clip in use;Drainage bag not overfilled (<2/3 full);Drainage bag below bladder;No dependent loops or kinks 4/28/2018  7:01 AM   Output (mL) 275 mL 4/28/2018 10:00 AM       Neurosurgery Physical Exam    AAOx3, PERRL, EOMI, FS, TM, 5/5 throughout, SILT.  Incision with dressing in place  Drain in place and working.    Significant Labs:    Recent Labs  Lab 04/26/18 2037 04/27/18 0235 04/27/18 1726 04/27/18  2330 04/28/18  0336 04/28/18  0737   GLU 88 87  87  --   --   --   --  101  --    * 130*  130*  130*  130*  < >  --   < > 132* 131*  131* 132*   K 4.0 3.6  3.6  --  4.7  --   --  3.7  --    CL 98 102  102  --   --   --   --  102  --    CO2 18* 17*  17*  --   --   --   --  21*  --    BUN 9 7  7  --   --   --   --  2*  --    CREATININE 0.7 0.6  0.6  --   --   --   --  0.5  --    CALCIUM 10.0 9.2  9.2  --   --   --   --  8.2*  --    MG  --  1.5*  --  1.8  --   --   --   --    < > = values in this interval not displayed.    Recent Labs  Lab 04/26/18 2037 04/27/18 0235 04/28/18  0336   WBC 7.87 6.53 9.21   HGB 11.9* 10.7* 9.6*   HCT 36.2* 32.2* 28.2*   * 546* 476*       Recent Labs  Lab 04/26/18 2037   INR 1.0   APTT 26.1     Microbiology Results (last 7 days)     ** No results found for the last 168 hours. **        All pertinent labs from the last 24 hours have been reviewed.    Significant Diagnostics:  I have reviewed all pertinent " imaging results/findings within the past 24 hours.

## 2018-04-28 NOTE — PROGRESS NOTES
Patient transferred back to room 7089 from CT via bed with 2 RN's, ambu bag, and portable monitor. No acute events during transfer. RN will continue to monitor.

## 2018-04-28 NOTE — ASSESSMENT & PLAN NOTE
53 y.o. year old female with R chronic SDH with mass effect. Now s/p edmond hole evac 4/27.    -- Neuro stable  -- CTH satisfactory  -- Neurochecks q1h  -- Continue SD drain to thumbprint suction.  -- Ppx abx while drain in place  -- HOB <30  -- Maintain SBP <150.  -- Continue home Dilantin  -- Correct Na slowly per NCC.   -- Medical management per NCC.

## 2018-04-28 NOTE — HOSPITAL COURSE
4/27: edmond holes evac SDH  4/28: drain in, stable.  4/29: NAOEN  4/30: drain out  5/1: stable TTF  5/2: TTF  5/3 NAEON. Patient with pain controlled. Has been OOB ambulating. DC home today. Follow up scheduled

## 2018-04-28 NOTE — PROGRESS NOTES
ICU Progress Note  Neurocritical Care    Admit Date: 4/26/2018  LOS: 2    CC: Chronic subdural hematoma    Code Status: Full Code     SUBJECTIVE:     Interval History/Significant Events:   Awake  ox3  On cardene gtt  afberile  Wbc- wnl  Hyponatremia  Hypokalemia  Moderate malnutrition           Medications:  Continuous Infusions:   sodium chloride 0.9% 50 mL/hr at 04/28/18 1000    nicardipine 5 mg/hr (04/28/18 1000)     Scheduled Meds:   ceFAZolin (ANCEF) IVPB  1 g Intravenous Q8H    levothyroxine  75 mcg Oral Before breakfast    lidocaine HCL 10 mg/ml (1%)  1 mL Other Once    metoprolol tartrate  50 mg Oral Q8H    nicotine  1 patch Transdermal Daily    phenytoin  100 mg Oral TID    sodium chloride 0.9%  3 mL Intravenous Q8H     PRN Meds:.acetaminophen, fentaNYL, labetalol, magnesium sulfate IVPB, magnesium sulfate IVPB, ondansetron, oxyCODONE, potassium chloride in water **AND** potassium chloride in water **AND** potassium chloride in water, sodium phosphate IVPB, sodium phosphate IVPB, sodium phosphate IVPB    OBJECTIVE:   Vital Signs (Most Recent):   Temp: 99.1 °F (37.3 °C) (04/28/18 0701)  Pulse: 78 (04/28/18 1001)  Resp: 16 (04/28/18 1001)  BP: 128/83 (04/28/18 1001)  SpO2: (!) 94 % (04/28/18 1001)    Vital Signs (24h Range):   Temp:  [97.1 °F (36.2 °C)-99.1 °F (37.3 °C)] 99.1 °F (37.3 °C)  Pulse:  [73-92] 78  Resp:  [16-65] 16  SpO2:  [93 %-100 %] 94 %  BP: (116-138)/(70-86) 128/83  Arterial Line BP: (119-155)/(62-77) 140/71    ICP/CPP (Last 24h):        I & O (Last 24h):   Intake/Output Summary (Last 24 hours) at 04/28/18 1207  Last data filed at 04/28/18 1100   Gross per 24 hour   Intake          2867.72 ml   Output             3500 ml   Net          -632.28 ml     Physical Exam:  GA: Alert, comfortable, no acute distress.   HEENT: No scleral icterus or JVD.   Pulmonary: Clear to auscultation A/P/L. No wheezing, crackles, or rhonchi.  Cardiac: RRR S1 & S2 w/o rubs/murmurs/gallops.   Abdominal:  "Bowel sounds present x 4. No appreciable hepatosplenomegaly.  Skin: No jaundice, rashes, or visible lesions.  Neuro:  Awake  Alert  oxe  Pupils 4 mm reactive  Non-focal  No change in exam           Lines/Drains/Airway:     [place];1       Arterial Line 04/27/18 1010 Right Radial (Active)   Site Assessment Clean;Dry;Intact;No redness;No swelling 4/28/2018  7:01 AM   Line Status Pulsatile blood flow 4/28/2018  7:01 AM   Art Line Waveform Appropriate;Square wave test performed 4/28/2018  7:01 AM   Arterial Line Interventions Zeroed and calibrated;Leveled 4/28/2018  7:01 AM   Color/Movement/Sensation Capillary refill less than 3 sec 4/28/2018  7:01 AM   Dressing Type Transparent 4/28/2018  7:01 AM   Dressing Status Biopatch in place;Clean 4/28/2018  7:01 AM   Dressing Intervention Dressing reinforced 4/28/2018  3:05 AM   Dressing Change Due 05/04/18 4/28/2018  7:01 AM           Closed/Suction Drain 04/27/18 1344 Right;Lateral Scalp Bulb 7 Fr. (Active)   Site Description Unable to view 4/28/2018  7:01 AM   Dressing Type [place] 4/28/2018  7:01 AM   Dressing Status Old drainage 4/28/2018  7:01 AM   Drainage Serosanguineous 4/28/2018  7:01 AM   Status Other (Comment) 4/28/2018  7:01 AM   Output (mL) 50 mL 4/28/2018  8:29 AM            Urethral Catheter 04/27/18 1254 Non-latex;Straight-tip 16 Fr. (Active)   Site Assessment Clean;Intact 4/28/2018  7:01 AM   Collection Container Urimeter 4/28/2018  7:01 AM   Securement Method secured to top of thigh w/ adhesive device 4/28/2018  7:01 AM   Catheter Care Performed yes 4/28/2018  7:01 AM   Reason for Continuing Urinary Catheterization Post operative 4/28/2018  7:01 AM   CAUTI Prevention Bundle StatLock in place w 1" slack;Intact seal between catheter & drainage tubing;Drainage bag off the floor;Green sheeting clip in use;Drainage bag not overfilled (<2/3 full);Drainage bag below bladder;No dependent loops or kinks 4/28/2018  7:01 AM   Output (mL) 275 mL 4/28/2018 10:00 AM "     Nutrition/Tube Feeds (if NPO state why):po    Labs:  ABG: No results for input(s): PH, PO2, PCO2, HCO3, POCSATURATED, BE in the last 24 hours.  BMP:  Recent Labs  Lab 04/27/18  1726  04/28/18  0336 04/28/18  0737   NA  --   < > 131*  131* 132*   K 4.7  --  3.7  --    CL  --   --  102  --    CO2  --   --  21*  --    BUN  --   --  2*  --    CREATININE  --   --  0.5  --    GLU  --   --  101  --    MG 1.8  --   --   --    < > = values in this interval not displayed.  LFT: Lab Results   Component Value Date    AST 9 (L) 04/28/2018    ALT <5 (L) 04/28/2018    ALKPHOS 81 04/28/2018    BILITOT 0.1 04/28/2018    ALBUMIN 2.8 (L) 04/28/2018    PROT 5.7 (L) 04/28/2018     CBC:   Lab Results   Component Value Date    WBC 9.21 04/28/2018    HGB 9.6 (L) 04/28/2018    HCT 28.2 (L) 04/28/2018    MCV 89 04/28/2018     (H) 04/28/2018     Microbiology x 7d:   Microbiology Results (last 7 days)     ** No results found for the last 168 hours. **        Imaging:  CT: Right subdural drain with significant improvement and subdural fluid collection and no midline shift.  No new collections or acute intracranial hemorrhage identified.    I personally reviewed the above image.    ASSESSMENT/PLAN:     Active Hospital Problems    Diagnosis    *Chronic subdural hematoma    Brain herniation    Headache    HTN (hypertension)    Hypothyroidism    Brain compression    Seizure after head injury    Hyponatremia            Plan:  Start b blockers  Wean cardene  Follow exam  discused with NS      Level;3

## 2018-04-28 NOTE — PROGRESS NOTES
Patient transferred to CT scan via bed with 2 RN's, ambu bag and portable monitor. No acute events during transfer. RN will continue to monitor.

## 2018-04-28 NOTE — PROGRESS NOTES
[facility]  Neurosurgery  Progress Note    Subjective:     History of Present Illness: Caridad Ortiz is a 53 y.o. year old female with known seizure disorder,HTN,and R convexity chronic SDH s/p seizure-related fall a few weeks ago. She was discharged 2 weeks ago.She came today for follow up CTH. CT head shows evolving chronic SDH, max thicknesses 2 cm and 8 mm MLS.She was sent to the ED for neurosurgical evaluation. She repots worsening R sided headache since discharge. She denies any AMS, weakness, numbness, speech or visual difficulties or seizure.She denies any antiplatelets/anticoagulants agents    Post-Op Info:  Procedure(s) (LRB):  [id number] HOLES AND POSSIBLE CRANIOTOMY  R SIDE (Right)   1 Day Post-Op     Interval History: To OR yesterday for edmond hole evac of chronic SDH without incident. NAEON.     Medications:  Continuous Infusions:   sodium chloride 0.9% 50 mL/hr at 04/28/18 1000    nicardipine 5 mg/hr (04/28/18 1000)     Scheduled Meds:   levothyroxine  75 mcg Oral Before breakfast    lidocaine HCL 10 mg/ml (1%)  1 mL Other Once    phenytoin  100 mg Oral TID    sodium chloride 0.9%  3 mL Intravenous Q8H     PRN Meds:acetaminophen, fentaNYL, labetalol, magnesium sulfate IVPB, magnesium sulfate IVPB, ondansetron, oxyCODONE, potassium chloride in water **AND** potassium chloride in water **AND** potassium chloride in water, sodium phosphate IVPB, sodium phosphate IVPB, sodium phosphate IVPB     Review of Systems  Objective:     Weight: 57.8 kg (127 lb 6.8 oz)  Body mass index is 21.2 kg/m².  Vital Signs (Most Recent):  Temp: 99.1 °F (37.3 °C) (04/28/18 0701)  Pulse: 78 (04/28/18 1001)  Resp: 16 (04/28/18 1001)  BP: 128/83 (04/28/18 1001)  SpO2: (!) 94 % (04/28/18 1001) Vital Signs (24h Range):  Temp:  [97.1 °F (36.2 °C)-99.1 °F (37.3 °C)] 99.1 °F (37.3 °C)  Pulse:  [73-92] 78  Resp:  [16-65] 16  SpO2:  [93 %-100 %] 94 %  BP: (116-138)/(70-86) 128/83  Arterial Line  "BP: (119-155)/(62-78) 140/71       Date 04/28/18 0700 - 04/29/18 0659   Shift 1358-9277 6078-3142 0868-0842 24 Hour Total   I  N  T  A  K  E   I.V.  (mL/kg) 318.8  (5.5)   318.8  (5.5)    IV Piggyback 400   400    Shift Total  (mL/kg) 718.8  (12.4)   718.8  (12.4)   O  U  T  P  U  T   Urine  (mL/kg/hr) 700   700    Drains 50   50    Shift Total  (mL/kg) 750  (13)   750  (13)   Weight (kg) 57.8 57.8 57.8 57.8            Closed/Suction Drain 04/27/18 1344 Right;Lateral Scalp Bulb 7 Fr. (Active)   Site Description Unable to view 4/28/2018  7:01 AM   Dressing Type [place] 4/28/2018  7:01 AM   Dressing Status Old drainage 4/28/2018  7:01 AM   Drainage Serosanguineous 4/28/2018  7:01 AM   Status Other (Comment) 4/28/2018  7:01 AM   Output (mL) 50 mL 4/28/2018  8:29 AM            Urethral Catheter 04/27/18 1254 Non-latex;Straight-tip 16 Fr. (Active)   Site Assessment Clean;Intact 4/28/2018  7:01 AM   Collection Container Urimeter 4/28/2018  7:01 AM   Securement Method secured to top of thigh w/ adhesive device 4/28/2018  7:01 AM   Catheter Care Performed yes 4/28/2018  7:01 AM   Reason for Continuing Urinary Catheterization Post operative 4/28/2018  7:01 AM   CAUTI Prevention Bundle StatLock in place w 1" slack;Intact seal between catheter & drainage tubing;Drainage bag off the floor;Green sheeting clip in use;Drainage bag not overfilled (<2/3 full);Drainage bag below bladder;No dependent loops or kinks 4/28/2018  7:01 AM   Output (mL) 275 mL 4/28/2018 10:00 AM       Neurosurgery Physical Exam    AAOx3, PERRL, EOMI, FS, TM, 5/5 throughout, SILT.  Incision with dressing in place  Drain in place and working.    Significant Labs:    Recent Labs  Lab 04/26/18 2037 04/27/18  0235  04/27/18  1726  04/27/18  2330 04/28/18  0336 04/28/18  0737   GLU 88 87  87  --   --   --   --  101  --    * 130*  130*  130*  130*  < >  --   < > 132* 131*  131* 132*   K 4.0 3.6  3.6  --  4.7  --   --  3.7  --    CL 98 102  102  " --   --   --   --  102  --    CO2 18* 17*  17*  --   --   --   --  21*  --    BUN 9 7  7  --   --   --   --  2*  --    CREATININE 0.7 0.6  0.6  --   --   --   --  0.5  --    CALCIUM 10.0 9.2  9.2  --   --   --   --  8.2*  --    MG  --  1.5*  --  1.8  --   --   --   --    < > = values in this interval not displayed.    Recent Labs  Lab 04/26/18 2037 04/27/18  0235 04/28/18  0336   WBC 7.87 6.53 9.21   HGB 11.9* 10.7* 9.6*   HCT 36.2* 32.2* 28.2*   * 546* 476*       Recent Labs  Lab 04/26/18 2037   INR 1.0   APTT 26.1     Microbiology Results (last 7 days)     ** No results found for the last 168 hours. **        All pertinent labs from the last 24 hours have been reviewed.    Significant Diagnostics:  I have reviewed all pertinent imaging results/findings within the past 24 hours.    Assessment/Plan:     Seizure after head injury     53 y.o. year old female with R chronic SDH with mass effect. Now s/p edmond hole evac 4/27.    -- Neuro stable  -- CTH satisfactory  -- Neurochecks q1h  -- Continue SD drain to thumbprint suction.  -- Ppx abx while drain in place  -- HOB <30  -- Maintain SBP <150.  -- Continue home Dilantin  -- Correct Na slowly per NCC.   -- Medical management per NCC.               Alen Jain MD  Neurosurgery  [facility]

## 2018-04-28 NOTE — PLAN OF CARE
Problem: Patient Care Overview  Goal: Plan of Care Review  Outcome: Ongoing (interventions implemented as appropriate)  POC reviewed with pt at 0500. Pt verbalized understanding. Questions and concerns addressed with patient. No acute events overnight. Cardene drip currently at 7.5 Patient repositioned per self as needed. RN will continue to monitor. See flowsheets for full assessment and VS info

## 2018-04-29 LAB
ALBUMIN SERPL BCP-MCNC: 2.8 G/DL
ALP SERPL-CCNC: 87 U/L
ALT SERPL W/O P-5'-P-CCNC: 5 U/L
ANION GAP SERPL CALC-SCNC: 6 MMOL/L
AST SERPL-CCNC: 9 U/L
BASOPHILS # BLD AUTO: 0.08 K/UL
BASOPHILS NFR BLD: 1.2 %
BILIRUB SERPL-MCNC: 0.1 MG/DL
BUN SERPL-MCNC: 3 MG/DL
CALCIUM SERPL-MCNC: 8.8 MG/DL
CHLORIDE SERPL-SCNC: 101 MMOL/L
CO2 SERPL-SCNC: 24 MMOL/L
CREAT SERPL-MCNC: 0.5 MG/DL
DIFFERENTIAL METHOD: ABNORMAL
EOSINOPHIL # BLD AUTO: 0.1 K/UL
EOSINOPHIL NFR BLD: 1.4 %
ERYTHROCYTE [DISTWIDTH] IN BLOOD BY AUTOMATED COUNT: 16.2 %
EST. GFR  (AFRICAN AMERICAN): >60 ML/MIN/1.73 M^2
EST. GFR  (NON AFRICAN AMERICAN): >60 ML/MIN/1.73 M^2
GLUCOSE SERPL-MCNC: 96 MG/DL
HCT VFR BLD AUTO: 30.9 %
HGB BLD-MCNC: 10.1 G/DL
IMM GRANULOCYTES # BLD AUTO: 0.02 K/UL
IMM GRANULOCYTES NFR BLD AUTO: 0.3 %
LYMPHOCYTES # BLD AUTO: 2 K/UL
LYMPHOCYTES NFR BLD: 31.5 %
MCH RBC QN AUTO: 29.9 PG
MCHC RBC AUTO-ENTMCNC: 32.7 G/DL
MCV RBC AUTO: 91 FL
MONOCYTES # BLD AUTO: 0.6 K/UL
MONOCYTES NFR BLD: 9 %
NEUTROPHILS # BLD AUTO: 3.6 K/UL
NEUTROPHILS NFR BLD: 56.6 %
NRBC BLD-RTO: 0 /100 WBC
PLATELET # BLD AUTO: 442 K/UL
PMV BLD AUTO: 8.9 FL
POTASSIUM SERPL-SCNC: 4.6 MMOL/L
PROT SERPL-MCNC: 5.9 G/DL
RBC # BLD AUTO: 3.38 M/UL
SODIUM SERPL-SCNC: 130 MMOL/L
SODIUM SERPL-SCNC: 131 MMOL/L
SODIUM SERPL-SCNC: 131 MMOL/L
SODIUM SERPL-SCNC: 132 MMOL/L
SODIUM SERPL-SCNC: 133 MMOL/L
WBC # BLD AUTO: 6.42 K/UL

## 2018-04-29 PROCEDURE — 94761 N-INVAS EAR/PLS OXIMETRY MLT: CPT

## 2018-04-29 PROCEDURE — 20000000 HC ICU ROOM

## 2018-04-29 PROCEDURE — 85025 COMPLETE CBC W/AUTO DIFF WBC: CPT

## 2018-04-29 PROCEDURE — 63600175 PHARM REV CODE 636 W HCPCS: Performed by: STUDENT IN AN ORGANIZED HEALTH CARE EDUCATION/TRAINING PROGRAM

## 2018-04-29 PROCEDURE — 25000003 PHARM REV CODE 250: Performed by: ANESTHESIOLOGY

## 2018-04-29 PROCEDURE — 99233 SBSQ HOSP IP/OBS HIGH 50: CPT | Mod: ,,, | Performed by: ANESTHESIOLOGY

## 2018-04-29 PROCEDURE — 84295 ASSAY OF SERUM SODIUM: CPT

## 2018-04-29 PROCEDURE — 25000003 PHARM REV CODE 250: Performed by: NURSE PRACTITIONER

## 2018-04-29 PROCEDURE — 27000221 HC OXYGEN, UP TO 24 HOURS

## 2018-04-29 PROCEDURE — A4216 STERILE WATER/SALINE, 10 ML: HCPCS | Performed by: NURSE PRACTITIONER

## 2018-04-29 PROCEDURE — 25000003 PHARM REV CODE 250: Performed by: STUDENT IN AN ORGANIZED HEALTH CARE EDUCATION/TRAINING PROGRAM

## 2018-04-29 PROCEDURE — 80053 COMPREHEN METABOLIC PANEL: CPT

## 2018-04-29 PROCEDURE — S4991 NICOTINE PATCH NONLEGEND: HCPCS | Performed by: STUDENT IN AN ORGANIZED HEALTH CARE EDUCATION/TRAINING PROGRAM

## 2018-04-29 RX ADMIN — PHENYTOIN SODIUM 100 MG: 100 CAPSULE ORAL at 08:04

## 2018-04-29 RX ADMIN — CEFAZOLIN 1 G: 330 INJECTION, POWDER, FOR SOLUTION INTRAMUSCULAR; INTRAVENOUS at 11:04

## 2018-04-29 RX ADMIN — CEFAZOLIN 1 G: 330 INJECTION, POWDER, FOR SOLUTION INTRAMUSCULAR; INTRAVENOUS at 03:04

## 2018-04-29 RX ADMIN — FENTANYL CITRATE 25 MCG: 50 INJECTION, SOLUTION INTRAMUSCULAR; INTRAVENOUS at 07:04

## 2018-04-29 RX ADMIN — FENTANYL CITRATE 25 MCG: 50 INJECTION, SOLUTION INTRAMUSCULAR; INTRAVENOUS at 01:04

## 2018-04-29 RX ADMIN — Medication 3 ML: at 09:04

## 2018-04-29 RX ADMIN — NICOTINE 1 PATCH: 21 PATCH, EXTENDED RELEASE TRANSDERMAL at 08:04

## 2018-04-29 RX ADMIN — FENTANYL CITRATE 25 MCG: 50 INJECTION, SOLUTION INTRAMUSCULAR; INTRAVENOUS at 04:04

## 2018-04-29 RX ADMIN — FENTANYL CITRATE 25 MCG: 50 INJECTION, SOLUTION INTRAMUSCULAR; INTRAVENOUS at 11:04

## 2018-04-29 RX ADMIN — METOPROLOL TARTRATE 75 MG: 50 TABLET ORAL at 01:04

## 2018-04-29 RX ADMIN — OXYCODONE HYDROCHLORIDE 5 MG: 5 TABLET ORAL at 06:04

## 2018-04-29 RX ADMIN — OXYCODONE HYDROCHLORIDE 5 MG: 5 TABLET ORAL at 01:04

## 2018-04-29 RX ADMIN — GABAPENTIN 800 MG: 100 CAPSULE ORAL at 08:04

## 2018-04-29 RX ADMIN — CEFAZOLIN 1 G: 330 INJECTION, POWDER, FOR SOLUTION INTRAMUSCULAR; INTRAVENOUS at 07:04

## 2018-04-29 RX ADMIN — FENTANYL CITRATE 25 MCG: 50 INJECTION, SOLUTION INTRAMUSCULAR; INTRAVENOUS at 05:04

## 2018-04-29 RX ADMIN — METOPROLOL TARTRATE 75 MG: 50 TABLET ORAL at 09:04

## 2018-04-29 RX ADMIN — Medication 3 ML: at 02:04

## 2018-04-29 RX ADMIN — ONDANSETRON 8 MG: 8 TABLET, ORALLY DISINTEGRATING ORAL at 09:04

## 2018-04-29 RX ADMIN — ONDANSETRON 8 MG: 8 TABLET, ORALLY DISINTEGRATING ORAL at 07:04

## 2018-04-29 RX ADMIN — OXYCODONE HYDROCHLORIDE 5 MG: 5 TABLET ORAL at 07:04

## 2018-04-29 RX ADMIN — PHENYTOIN SODIUM 100 MG: 100 CAPSULE ORAL at 03:04

## 2018-04-29 RX ADMIN — Medication 3 ML: at 05:04

## 2018-04-29 RX ADMIN — METOPROLOL TARTRATE 50 MG: 50 TABLET ORAL at 05:04

## 2018-04-29 RX ADMIN — LEVOTHYROXINE SODIUM 75 MCG: 75 TABLET ORAL at 05:04

## 2018-04-29 RX ADMIN — NICARDIPINE HYDROCHLORIDE 2.5 MG/HR: 0.2 INJECTION, SOLUTION INTRAVENOUS at 03:04

## 2018-04-29 RX ADMIN — FENTANYL CITRATE 25 MCG: 50 INJECTION, SOLUTION INTRAMUSCULAR; INTRAVENOUS at 09:04

## 2018-04-29 RX ADMIN — FENTANYL CITRATE 25 MCG: 50 INJECTION, SOLUTION INTRAMUSCULAR; INTRAVENOUS at 03:04

## 2018-04-29 RX ADMIN — OXYCODONE HYDROCHLORIDE 5 MG: 5 TABLET ORAL at 12:04

## 2018-04-29 NOTE — PLAN OF CARE
Problem: Patient Care Overview  Goal: Plan of Care Review  Outcome: Ongoing (interventions implemented as appropriate)  POC reviewed with patient at 0400. Patient verbalized understanding. Questions and concerns addressed. No acute events overnight. Patient on 2L Nasal Cannula. Cardene currently at 2.5. NS @ 100. HOB less than 30 per orders. RN will continue to monitor. See flowsheets for full assessment and VS info

## 2018-04-29 NOTE — PROGRESS NOTES
ICU Progress Note  Neurocritical Care    Admit Date: 4/26/2018  LOS: 3    CC: Chronic subdural hematoma    Code Status: Full Code     SUBJECTIVE:     Interval History/Significant Events:   Awake  Alert  ox3  Hyponatremia  On IV fluids  On cardene gtt      Medications:  Continuous Infusions:   sodium chloride 0.9% 100 mL/hr at 04/29/18 1100    nicardipine Stopped (04/29/18 0930)     Scheduled Meds:   ceFAZolin (ANCEF) IVPB  1 g Intravenous Q8H    gabapentin  800 mg Oral BID    levothyroxine  75 mcg Oral Before breakfast    lidocaine HCL 10 mg/ml (1%)  1 mL Other Once    metoprolol  5 mg Intravenous Once    metoprolol tartrate  75 mg Oral Q8H    nicotine  1 patch Transdermal Daily    phenytoin  100 mg Oral TID    sodium chloride 0.9%  3 mL Intravenous Q8H     PRN Meds:.acetaminophen, fentaNYL, labetalol, magnesium sulfate IVPB, magnesium sulfate IVPB, ondansetron, oxyCODONE, potassium chloride in water **AND** potassium chloride in water **AND** potassium chloride in water, sodium phosphate IVPB, sodium phosphate IVPB, sodium phosphate IVPB    OBJECTIVE:   Vital Signs (Most Recent):   Temp: 98.7 °F (37.1 °C) (04/29/18 1101)  Pulse: 70 (04/29/18 1101)  Resp: 15 (04/29/18 1101)  BP: 138/84 (04/29/18 1101)  SpO2: 99 % (04/29/18 1101)    Vital Signs (24h Range):   Temp:  [97.6 °F (36.4 °C)-99.1 °F (37.3 °C)] 98.7 °F (37.1 °C)  Pulse:  [65-86] 70  Resp:  [10-69] 15  SpO2:  [90 %-100 %] 99 %  BP: (116-156)/(77-90) 138/84    ICP/CPP (Last 24h):        I & O (Last 24h):   Intake/Output Summary (Last 24 hours) at 04/29/18 1153  Last data filed at 04/29/18 1100   Gross per 24 hour   Intake          4019.59 ml   Output             4005 ml   Net            14.59 ml     Physical Exam:  GA: Alert, comfortable, no acute distress.   HEENT: No scleral icterus or JVD.   Pulmonary: Clear to auscultation A/P/L. No wheezing, crackles, or rhonchi.  Cardiac: RRR S1 & S2 w/o rubs/murmurs/gallops.   Abdominal: Bowel sounds present  "x 4. No appreciable hepatosplenomegaly.  Skin: No jaundice, rashes, or visible lesions.  Neuro:    Awake  Alert  ox3  Non-focal  No change in exam         Lines/Drains/Airway:       [place];2         Arterial Line 04/27/18 1010 Right Radial (Active)   Site Assessment Clean;Dry;Intact;No redness;No swelling 4/29/2018 11:01 AM   Line Status Pulsatile blood flow 4/29/2018 11:01 AM   Art Line Waveform Appropriate;Square wave test performed 4/29/2018 11:01 AM   Arterial Line Interventions Zeroed and calibrated;Leveled 4/29/2018 11:01 AM   Color/Movement/Sensation Capillary refill less than 3 sec 4/29/2018 11:01 AM   Dressing Type Transparent 4/29/2018 11:01 AM   Dressing Status Biopatch in place;Clean 4/29/2018 11:01 AM   Dressing Intervention Dressing reinforced 4/29/2018  3:05 AM   Dressing Change Due 05/04/18 4/29/2018 11:01 AM           Closed/Suction Drain 04/27/18 1344 Right;Lateral Scalp Bulb 7 Fr. (Active)   Site Description Unable to view 4/29/2018 11:01 AM   Dressing Type [place] 4/29/2018 11:01 AM   Dressing Status Old drainage 4/29/2018 11:01 AM   Drainage Serosanguineous 4/29/2018 11:01 AM   Status Other (Comment) 4/29/2018 11:01 AM   Output (mL) 90 mL 4/29/2018  7:00 AM            Urethral Catheter 04/27/18 1254 Non-latex;Straight-tip 16 Fr. (Active)   Site Assessment Clean;Intact 4/29/2018 11:01 AM   Collection Container Urimeter 4/29/2018 11:01 AM   Securement Method secured to top of thigh w/ adhesive device 4/29/2018 11:01 AM   Catheter Care Performed yes 4/29/2018 11:01 AM   Reason for Continuing Urinary Catheterization Post operative 4/29/2018 11:01 AM   CAUTI Prevention Bundle StatLock in place w 1" slack;Intact seal between catheter & drainage tubing;Drainage bag off the floor;Green sheeting clip in use;No dependent loops or kinks;Drainage bag not overfilled (<2/3 full);Drainage bag below bladder 4/29/2018 11:01 AM   Output (mL) 150 mL 4/29/2018 11:00 AM     Nutrition/Tube Feeds (if NPO state " why):  po    Labs:  ABG: No results for input(s): PH, PO2, PCO2, HCO3, POCSATURATED, BE in the last 24 hours.  BMP:  Recent Labs  Lab 04/29/18  0340 04/29/18  0752   *  131* 132*   K 4.6  --      --    CO2 24  --    BUN 3*  --    CREATININE 0.5  --    GLU 96  --      LFT: Lab Results   Component Value Date    AST 9 (L) 04/29/2018    ALT 5 (L) 04/29/2018    ALKPHOS 87 04/29/2018    BILITOT 0.1 04/29/2018    ALBUMIN 2.8 (L) 04/29/2018    PROT 5.9 (L) 04/29/2018     CBC:   Lab Results   Component Value Date    WBC 6.42 04/29/2018    HGB 10.1 (L) 04/29/2018    HCT 30.9 (L) 04/29/2018    MCV 91 04/29/2018     (H) 04/29/2018     Microbiology x 7d:   Microbiology Results (last 7 days)     ** No results found for the last 168 hours. **        Imaging:  CT scan; Right subdural drain with significant improvement and subdural fluid collection and no midline shift.  No new collections or acute intracranial hemorrhage identified.  I personally reviewed the above image.    ASSESSMENT/PLAN:     Active Hospital Problems    Diagnosis    *Chronic subdural hematoma    Brain herniation    Headache    HTN (hypertension)    Hypothyroidism    Brain compression    Seizure after head injury    Hyponatremia        Plan:  Wean cardene  Follow exam  Free water restriction  Follow na levels    Level;3

## 2018-04-29 NOTE — SUBJECTIVE & OBJECTIVE
Interval History: NAEON.     Medications:  Continuous Infusions:   sodium chloride 0.9% 100 mL/hr at 04/29/18 1500    nicardipine 2.5 mg/hr (04/29/18 1527)     Scheduled Meds:   ceFAZolin (ANCEF) IVPB  1 g Intravenous Q8H    gabapentin  800 mg Oral BID    levothyroxine  75 mcg Oral Before breakfast    lidocaine HCL 10 mg/ml (1%)  1 mL Other Once    metoprolol  5 mg Intravenous Once    metoprolol tartrate  75 mg Oral Q8H    nicotine  1 patch Transdermal Daily    phenytoin  100 mg Oral TID    sodium chloride 0.9%  3 mL Intravenous Q8H     PRN Meds:acetaminophen, fentaNYL, labetalol, magnesium sulfate IVPB, magnesium sulfate IVPB, ondansetron, oxyCODONE, potassium chloride in water **AND** potassium chloride in water **AND** potassium chloride in water, sodium phosphate IVPB, sodium phosphate IVPB, sodium phosphate IVPB     Review of Systems    Objective:     Weight: 57.8 kg (127 lb 6.8 oz)  Body mass index is 21.2 kg/m².  Vital Signs (Most Recent):  Temp: 98.8 °F (37.1 °C) (04/29/18 1501)  Pulse: 65 (04/29/18 1501)  Resp: 15 (04/29/18 1501)  BP: (!) 144/92 (04/29/18 1501)  SpO2: 99 % (04/29/18 1501) Vital Signs (24h Range):  Temp:  [98.7 °F (37.1 °C)-99.1 °F (37.3 °C)] 98.8 °F (37.1 °C)  Pulse:  [65-86] 65  Resp:  [10-69] 15  SpO2:  [90 %-100 %] 99 %  BP: (116-144)/(77-96) 144/92       Date 04/29/18 0700 - 04/30/18 0659   Shift 7582-2256 8624-5890 1080-1777 24 Hour Total   I  N  T  A  K  E   P.O. 980   980    I.V.  (mL/kg) 846.9  (14.7) 100  (1.7)  946.9  (16.4)    Shift Total  (mL/kg) 1826.9  (31.6) 100  (1.7)  1926.9  (33.3)   O  U  T  P  U  T   Urine  (mL/kg/hr) 1100  (2.4)   1100    Drains 130   130    Shift Total  (mL/kg) 1230  (21.3)   1230  (21.3)   Weight (kg) 57.8 57.8 57.8 57.8            Closed/Suction Drain 04/27/18 1344 Right;Lateral Scalp Bulb 7 Fr. (Active)   Site Description Unable to view 4/28/2018  7:01 AM   Dressing Type [place] 4/28/2018  7:01 AM   Dressing Status Old drainage  "4/28/2018  7:01 AM   Drainage Serosanguineous 4/28/2018  7:01 AM   Status Other (Comment) 4/28/2018  7:01 AM   Output (mL) 50 mL 4/28/2018  8:29 AM            Urethral Catheter 04/27/18 1254 Non-latex;Straight-tip 16 Fr. (Active)   Site Assessment Clean;Intact 4/28/2018  7:01 AM   Collection Container Urimeter 4/28/2018  7:01 AM   Securement Method secured to top of thigh w/ adhesive device 4/28/2018  7:01 AM   Catheter Care Performed yes 4/28/2018  7:01 AM   Reason for Continuing Urinary Catheterization Post operative 4/28/2018  7:01 AM   CAUTI Prevention Bundle StatLock in place w 1" slack;Intact seal between catheter & drainage tubing;Drainage bag off the floor;Green sheeting clip in use;Drainage bag not overfilled (<2/3 full);Drainage bag below bladder;No dependent loops or kinks 4/28/2018  7:01 AM   Output (mL) 275 mL 4/28/2018 10:00 AM       Neurosurgery Physical Exam      AAOx3, PERRL, EOMI, FS, TM, 5/5 throughout, SILT.  Incision with dressing in place  Drain in place and working.    Significant Labs:    Recent Labs  Lab 04/27/18  1726  04/28/18  0336  04/29/18  0340 04/29/18  0752 04/29/18  1208 04/29/18  1527   GLU  --   --  101  --  96  --   --   --    NA  --   < > 131*  131*  < > 131*  131* 132* 133* 130*   K 4.7  --  3.7  --  4.6  --   --   --    CL  --   --  102  --  101  --   --   --    CO2  --   --  21*  --  24  --   --   --    BUN  --   --  2*  --  3*  --   --   --    CREATININE  --   --  0.5  --  0.5  --   --   --    CALCIUM  --   --  8.2*  --  8.8  --   --   --    MG 1.8  --   --   --   --   --   --   --    < > = values in this interval not displayed.    Recent Labs  Lab 04/28/18  0336 04/29/18  0340   WBC 9.21 6.42   HGB 9.6* 10.1*   HCT 28.2* 30.9*   * 442*     No results for input(s): LABPT, INR, APTT in the last 48 hours.  Microbiology Results (last 7 days)     ** No results found for the last 168 hours. **        All pertinent labs from the last 24 hours have been " reviewed.    Significant Diagnostics:  I have reviewed all pertinent imaging results/findings within the past 24 hours.

## 2018-04-29 NOTE — PLAN OF CARE
Problem: Patient Care Overview  Goal: Plan of Care Review  Outcome: Ongoing (interventions implemented as appropriate)  POC reviewed with pt. Pt verbalized understanding. Questions and concerns addressed. Pt progressing towards goals. Pain controlled with PRN medications. No acute events. Will continue to monitor. Refer to flowsheets for full assessment and VS info.

## 2018-04-29 NOTE — ASSESSMENT & PLAN NOTE
53 y.o. year old female with R chronic SDH with mass effect. Now s/p edmond hole evac 4/27.    -- Neuro stable  -- Neurochecks q1h  -- Continue SD drain to thumbprint suction.  -- Ppx abx while drain in place  -- HOB <30  -- Maintain SBP <150.  -- Continue home Dilantin  -- Medical management per NCC.   -- Likely drain out tomorrow

## 2018-04-29 NOTE — PROGRESS NOTES
[facility]  Neurosurgery  Progress Note    Subjective:     History of Present Illness: Caridad Ortiz is a 53 y.o. year old female with known seizure disorder,HTN,and R convexity chronic SDH s/p seizure-related fall a few weeks ago. She was discharged 2 weeks ago.She came today for follow up CTH. CT head shows evolving chronic SDH, max thicknesses 2 cm and 8 mm MLS.She was sent to the ED for neurosurgical evaluation. She repots worsening R sided headache since discharge. She denies any AMS, weakness, numbness, speech or visual difficulties or seizure.She denies any antiplatelets/anticoagulants agents    Post-Op Info:  Procedure(s) (LRB):  [id number] HOLES AND POSSIBLE CRANIOTOMY  R SIDE (Right)   2 Days Post-Op     Interval History: NAEON.     Medications:  Continuous Infusions:   sodium chloride 0.9% 100 mL/hr at 04/29/18 1500    nicardipine 2.5 mg/hr (04/29/18 1527)     Scheduled Meds:   ceFAZolin (ANCEF) IVPB  1 g Intravenous Q8H    gabapentin  800 mg Oral BID    levothyroxine  75 mcg Oral Before breakfast    lidocaine HCL 10 mg/ml (1%)  1 mL Other Once    metoprolol  5 mg Intravenous Once    metoprolol tartrate  75 mg Oral Q8H    nicotine  1 patch Transdermal Daily    phenytoin  100 mg Oral TID    sodium chloride 0.9%  3 mL Intravenous Q8H     PRN Meds:acetaminophen, fentaNYL, labetalol, magnesium sulfate IVPB, magnesium sulfate IVPB, ondansetron, oxyCODONE, potassium chloride in water **AND** potassium chloride in water **AND** potassium chloride in water, sodium phosphate IVPB, sodium phosphate IVPB, sodium phosphate IVPB     Review of Systems    Objective:     Weight: 57.8 kg (127 lb 6.8 oz)  Body mass index is 21.2 kg/m².  Vital Signs (Most Recent):  Temp: 98.8 °F (37.1 °C) (04/29/18 1501)  Pulse: 65 (04/29/18 1501)  Resp: 15 (04/29/18 1501)  BP: (!) 144/92 (04/29/18 1501)  SpO2: 99 % (04/29/18 1501) Vital Signs (24h Range):  Temp:  [98.7 °F (37.1 °C)-99.1 °F  "(37.3 °C)] 98.8 °F (37.1 °C)  Pulse:  [65-86] 65  Resp:  [10-69] 15  SpO2:  [90 %-100 %] 99 %  BP: (116-144)/(77-96) 144/92       Date 04/29/18 0700 - 04/30/18 0659   Shift 5741-0468 7988-2198 2842-6423 24 Hour Total   I  N  T  A  K  E   P.O. 980   980    I.V.  (mL/kg) 846.9  (14.7) 100  (1.7)  946.9  (16.4)    Shift Total  (mL/kg) 1826.9  (31.6) 100  (1.7)  1926.9  (33.3)   O  U  T  P  U  T   Urine  (mL/kg/hr) 1100  (2.4)   1100    Drains 130   130    Shift Total  (mL/kg) 1230  (21.3)   1230  (21.3)   Weight (kg) 57.8 57.8 57.8 57.8            Closed/Suction Drain 04/27/18 1344 Right;Lateral Scalp Bulb 7 Fr. (Active)   Site Description Unable to view 4/28/2018  7:01 AM   Dressing Type [place] 4/28/2018  7:01 AM   Dressing Status Old drainage 4/28/2018  7:01 AM   Drainage Serosanguineous 4/28/2018  7:01 AM   Status Other (Comment) 4/28/2018  7:01 AM   Output (mL) 50 mL 4/28/2018  8:29 AM            Urethral Catheter 04/27/18 1254 Non-latex;Straight-tip 16 Fr. (Active)   Site Assessment Clean;Intact 4/28/2018  7:01 AM   Collection Container Urimeter 4/28/2018  7:01 AM   Securement Method secured to top of thigh w/ adhesive device 4/28/2018  7:01 AM   Catheter Care Performed yes 4/28/2018  7:01 AM   Reason for Continuing Urinary Catheterization Post operative 4/28/2018  7:01 AM   CAUTI Prevention Bundle StatLock in place w 1" slack;Intact seal between catheter & drainage tubing;Drainage bag off the floor;Green sheeting clip in use;Drainage bag not overfilled (<2/3 full);Drainage bag below bladder;No dependent loops or kinks 4/28/2018  7:01 AM   Output (mL) 275 mL 4/28/2018 10:00 AM       Neurosurgery Physical Exam      AAOx3, PERRL, EOMI, FS, TM, 5/5 throughout, SILT.  Incision with dressing in place  Drain in place and working.    Significant Labs:    Recent Labs  Lab 04/27/18  1726  04/28/18  0336  04/29/18  0340 04/29/18  0752 04/29/18  1208 04/29/18  1527   GLU  --   --  101  --  96  --   --   --    NA  --   " < > 131*  131*  < > 131*  131* 132* 133* 130*   K 4.7  --  3.7  --  4.6  --   --   --    CL  --   --  102  --  101  --   --   --    CO2  --   --  21*  --  24  --   --   --    BUN  --   --  2*  --  3*  --   --   --    CREATININE  --   --  0.5  --  0.5  --   --   --    CALCIUM  --   --  8.2*  --  8.8  --   --   --    MG 1.8  --   --   --   --   --   --   --    < > = values in this interval not displayed.    Recent Labs  Lab 04/28/18  0336 04/29/18  0340   WBC 9.21 6.42   HGB 9.6* 10.1*   HCT 28.2* 30.9*   * 442*     No results for input(s): LABPT, INR, APTT in the last 48 hours.  Microbiology Results (last 7 days)     ** No results found for the last 168 hours. **        All pertinent labs from the last 24 hours have been reviewed.    Significant Diagnostics:  I have reviewed all pertinent imaging results/findings within the past 24 hours.    Assessment/Plan:     Seizure after head injury     53 y.o. year old female with R chronic SDH with mass effect. Now s/p edmond hole evac 4/27.    -- Neuro stable  -- Neurochecks q1h  -- Continue SD drain to thumbprint suction.  -- Ppx abx while drain in place  -- HOB <30  -- Maintain SBP <150.  -- Continue home Dilantin  -- Medical management per NCC.   -- Likely drain out tomorrow            Alen Jain MD  Neurosurgery  [facility]

## 2018-04-30 LAB
ALBUMIN SERPL BCP-MCNC: 2.6 G/DL
ALP SERPL-CCNC: 85 U/L
ALT SERPL W/O P-5'-P-CCNC: 7 U/L
ANION GAP SERPL CALC-SCNC: 8 MMOL/L
AST SERPL-CCNC: 12 U/L
BASOPHILS # BLD AUTO: 0.1 K/UL
BASOPHILS NFR BLD: 1.6 %
BILIRUB SERPL-MCNC: 0.1 MG/DL
BUN SERPL-MCNC: 5 MG/DL
CALCIUM SERPL-MCNC: 8.6 MG/DL
CHLORIDE SERPL-SCNC: 101 MMOL/L
CO2 SERPL-SCNC: 23 MMOL/L
CREAT SERPL-MCNC: 0.6 MG/DL
DIFFERENTIAL METHOD: ABNORMAL
EOSINOPHIL # BLD AUTO: 0.2 K/UL
EOSINOPHIL NFR BLD: 3.9 %
ERYTHROCYTE [DISTWIDTH] IN BLOOD BY AUTOMATED COUNT: 15.9 %
EST. GFR  (AFRICAN AMERICAN): >60 ML/MIN/1.73 M^2
EST. GFR  (NON AFRICAN AMERICAN): >60 ML/MIN/1.73 M^2
GLUCOSE SERPL-MCNC: 97 MG/DL
HCT VFR BLD AUTO: 31.8 %
HGB BLD-MCNC: 10.3 G/DL
IMM GRANULOCYTES # BLD AUTO: 0.02 K/UL
IMM GRANULOCYTES NFR BLD AUTO: 0.3 %
LYMPHOCYTES # BLD AUTO: 1.8 K/UL
LYMPHOCYTES NFR BLD: 30 %
MCH RBC QN AUTO: 29.8 PG
MCHC RBC AUTO-ENTMCNC: 32.4 G/DL
MCV RBC AUTO: 92 FL
MONOCYTES # BLD AUTO: 0.5 K/UL
MONOCYTES NFR BLD: 8.5 %
NEUTROPHILS # BLD AUTO: 3.4 K/UL
NEUTROPHILS NFR BLD: 55.7 %
NRBC BLD-RTO: 0 /100 WBC
PLATELET # BLD AUTO: 362 K/UL
PMV BLD AUTO: 8.9 FL
POTASSIUM SERPL-SCNC: 4.5 MMOL/L
PROT SERPL-MCNC: 5.6 G/DL
RBC # BLD AUTO: 3.46 M/UL
SODIUM SERPL-SCNC: 131 MMOL/L
SODIUM SERPL-SCNC: 131 MMOL/L
SODIUM SERPL-SCNC: 132 MMOL/L
SODIUM SERPL-SCNC: 133 MMOL/L
T4 FREE SERPL-MCNC: 0.81 NG/DL
WBC # BLD AUTO: 6.11 K/UL

## 2018-04-30 PROCEDURE — 25000003 PHARM REV CODE 250: Performed by: STUDENT IN AN ORGANIZED HEALTH CARE EDUCATION/TRAINING PROGRAM

## 2018-04-30 PROCEDURE — 25000003 PHARM REV CODE 250: Performed by: NURSE PRACTITIONER

## 2018-04-30 PROCEDURE — 63600175 PHARM REV CODE 636 W HCPCS: Performed by: STUDENT IN AN ORGANIZED HEALTH CARE EDUCATION/TRAINING PROGRAM

## 2018-04-30 PROCEDURE — 84439 ASSAY OF FREE THYROXINE: CPT

## 2018-04-30 PROCEDURE — 20000000 HC ICU ROOM

## 2018-04-30 PROCEDURE — S4991 NICOTINE PATCH NONLEGEND: HCPCS | Performed by: STUDENT IN AN ORGANIZED HEALTH CARE EDUCATION/TRAINING PROGRAM

## 2018-04-30 PROCEDURE — A4216 STERILE WATER/SALINE, 10 ML: HCPCS | Performed by: NURSE PRACTITIONER

## 2018-04-30 PROCEDURE — 25000003 PHARM REV CODE 250: Performed by: ANESTHESIOLOGY

## 2018-04-30 PROCEDURE — 94761 N-INVAS EAR/PLS OXIMETRY MLT: CPT

## 2018-04-30 PROCEDURE — 25000003 PHARM REV CODE 250: Performed by: PHYSICIAN ASSISTANT

## 2018-04-30 PROCEDURE — 63600175 PHARM REV CODE 636 W HCPCS: Performed by: PHYSICIAN ASSISTANT

## 2018-04-30 PROCEDURE — 84295 ASSAY OF SERUM SODIUM: CPT | Mod: 91

## 2018-04-30 PROCEDURE — 85025 COMPLETE CBC W/AUTO DIFF WBC: CPT

## 2018-04-30 PROCEDURE — 94799 UNLISTED PULMONARY SVC/PX: CPT

## 2018-04-30 PROCEDURE — 99291 CRITICAL CARE FIRST HOUR: CPT | Mod: ,,, | Performed by: PSYCHIATRY & NEUROLOGY

## 2018-04-30 PROCEDURE — 80053 COMPREHEN METABOLIC PANEL: CPT

## 2018-04-30 RX ORDER — SODIUM,POTASSIUM PHOSPHATES 280-250MG
2 POWDER IN PACKET (EA) ORAL
Status: DISCONTINUED | OUTPATIENT
Start: 2018-04-30 | End: 2018-05-03 | Stop reason: HOSPADM

## 2018-04-30 RX ORDER — POTASSIUM CHLORIDE 20 MEQ/15ML
40 SOLUTION ORAL
Status: DISCONTINUED | OUTPATIENT
Start: 2018-04-30 | End: 2018-05-03 | Stop reason: HOSPADM

## 2018-04-30 RX ORDER — IPRATROPIUM BROMIDE AND ALBUTEROL SULFATE 2.5; .5 MG/3ML; MG/3ML
3 SOLUTION RESPIRATORY (INHALATION) EVERY 6 HOURS
Status: DISCONTINUED | OUTPATIENT
Start: 2018-05-01 | End: 2018-05-01

## 2018-04-30 RX ORDER — LANOLIN ALCOHOL/MO/W.PET/CERES
800 CREAM (GRAM) TOPICAL
Status: DISCONTINUED | OUTPATIENT
Start: 2018-04-30 | End: 2018-05-03 | Stop reason: HOSPADM

## 2018-04-30 RX ORDER — IPRATROPIUM BROMIDE AND ALBUTEROL SULFATE 2.5; .5 MG/3ML; MG/3ML
3 SOLUTION RESPIRATORY (INHALATION) EVERY 6 HOURS
Status: DISCONTINUED | OUTPATIENT
Start: 2018-04-30 | End: 2018-04-30

## 2018-04-30 RX ORDER — POTASSIUM CHLORIDE 20 MEQ/15ML
60 SOLUTION ORAL
Status: DISCONTINUED | OUTPATIENT
Start: 2018-04-30 | End: 2018-05-03 | Stop reason: HOSPADM

## 2018-04-30 RX ORDER — HEPARIN SODIUM 5000 [USP'U]/ML
5000 INJECTION, SOLUTION INTRAVENOUS; SUBCUTANEOUS EVERY 8 HOURS
Status: DISCONTINUED | OUTPATIENT
Start: 2018-04-30 | End: 2018-05-03 | Stop reason: HOSPADM

## 2018-04-30 RX ORDER — GABAPENTIN 300 MG/1
900 CAPSULE ORAL EVERY 8 HOURS
Status: DISCONTINUED | OUTPATIENT
Start: 2018-04-30 | End: 2018-05-01

## 2018-04-30 RX ADMIN — SODIUM CHLORIDE TAB 1 GM 2 G: 1 TAB at 09:04

## 2018-04-30 RX ADMIN — FENTANYL CITRATE 25 MCG: 50 INJECTION, SOLUTION INTRAMUSCULAR; INTRAVENOUS at 01:04

## 2018-04-30 RX ADMIN — FENTANYL CITRATE 25 MCG: 50 INJECTION, SOLUTION INTRAMUSCULAR; INTRAVENOUS at 08:04

## 2018-04-30 RX ADMIN — SODIUM CHLORIDE TAB 1 GM 2 G: 1 TAB at 05:04

## 2018-04-30 RX ADMIN — Medication 3 ML: at 06:04

## 2018-04-30 RX ADMIN — NICOTINE 1 PATCH: 21 PATCH, EXTENDED RELEASE TRANSDERMAL at 08:04

## 2018-04-30 RX ADMIN — GABAPENTIN 900 MG: 300 CAPSULE ORAL at 05:04

## 2018-04-30 RX ADMIN — OXYCODONE HYDROCHLORIDE 5 MG: 5 TABLET ORAL at 04:04

## 2018-04-30 RX ADMIN — Medication 3 ML: at 01:04

## 2018-04-30 RX ADMIN — ACETAMINOPHEN 650 MG: 325 TABLET ORAL at 11:04

## 2018-04-30 RX ADMIN — CEFAZOLIN 1 G: 330 INJECTION, POWDER, FOR SOLUTION INTRAMUSCULAR; INTRAVENOUS at 10:04

## 2018-04-30 RX ADMIN — HEPARIN SODIUM 5000 UNITS: 5000 INJECTION, SOLUTION INTRAVENOUS; SUBCUTANEOUS at 09:04

## 2018-04-30 RX ADMIN — METOPROLOL TARTRATE 75 MG: 50 TABLET ORAL at 06:04

## 2018-04-30 RX ADMIN — FENTANYL CITRATE 25 MCG: 50 INJECTION, SOLUTION INTRAMUSCULAR; INTRAVENOUS at 06:04

## 2018-04-30 RX ADMIN — Medication 3 ML: at 09:04

## 2018-04-30 RX ADMIN — GABAPENTIN 800 MG: 100 CAPSULE ORAL at 08:04

## 2018-04-30 RX ADMIN — SODIUM CHLORIDE: 0.9 INJECTION, SOLUTION INTRAVENOUS at 08:04

## 2018-04-30 RX ADMIN — PHENYTOIN SODIUM 100 MG: 100 CAPSULE ORAL at 02:04

## 2018-04-30 RX ADMIN — CEFAZOLIN 1 G: 330 INJECTION, POWDER, FOR SOLUTION INTRAMUSCULAR; INTRAVENOUS at 04:04

## 2018-04-30 RX ADMIN — OXYCODONE HYDROCHLORIDE 5 MG: 5 TABLET ORAL at 09:04

## 2018-04-30 RX ADMIN — HEPARIN SODIUM 5000 UNITS: 5000 INJECTION, SOLUTION INTRAVENOUS; SUBCUTANEOUS at 05:04

## 2018-04-30 RX ADMIN — OXYCODONE HYDROCHLORIDE 5 MG: 5 TABLET ORAL at 02:04

## 2018-04-30 RX ADMIN — PHENYTOIN SODIUM 100 MG: 100 CAPSULE ORAL at 09:04

## 2018-04-30 RX ADMIN — METOPROLOL TARTRATE 75 MG: 50 TABLET ORAL at 09:04

## 2018-04-30 RX ADMIN — LEVOTHYROXINE SODIUM 75 MCG: 75 TABLET ORAL at 06:04

## 2018-04-30 RX ADMIN — PHENYTOIN SODIUM 100 MG: 100 CAPSULE ORAL at 08:04

## 2018-04-30 RX ADMIN — FENTANYL CITRATE 25 MCG: 50 INJECTION, SOLUTION INTRAMUSCULAR; INTRAVENOUS at 03:04

## 2018-04-30 RX ADMIN — METOPROLOL TARTRATE 75 MG: 50 TABLET ORAL at 01:04

## 2018-04-30 RX ADMIN — ONDANSETRON 8 MG: 8 TABLET, ORALLY DISINTEGRATING ORAL at 08:04

## 2018-04-30 RX ADMIN — OXYCODONE HYDROCHLORIDE 5 MG: 5 TABLET ORAL at 10:04

## 2018-04-30 RX ADMIN — FENTANYL CITRATE 25 MCG: 50 INJECTION, SOLUTION INTRAMUSCULAR; INTRAVENOUS at 04:04

## 2018-04-30 RX ADMIN — FENTANYL CITRATE 25 MCG: 50 INJECTION, SOLUTION INTRAMUSCULAR; INTRAVENOUS at 10:04

## 2018-04-30 RX ADMIN — ONDANSETRON 8 MG: 8 TABLET, ORALLY DISINTEGRATING ORAL at 10:04

## 2018-04-30 RX ADMIN — GABAPENTIN 900 MG: 300 CAPSULE ORAL at 09:04

## 2018-04-30 RX ADMIN — ACETAMINOPHEN 650 MG: 325 TABLET ORAL at 06:04

## 2018-04-30 NOTE — PLAN OF CARE
Problem: Patient Care Overview  Goal: Plan of Care Review  POC reviewed with pt at 1400. Pt verbalized understanding. Questions and concerns addressed. No acute events today.  Pt is AAOx4 moves all extremeties spontaneously.  On 2L NC.  Voiding in bed pan.  JESSICA drain removed today.  Pt tolerated well.  Pt progressing toward goals. Will continue to monitor. See flowsheets for full assessment and VS info.

## 2018-04-30 NOTE — PROGRESS NOTES
[facility]  Neuorsurgery  Progress Note     Patient Name: Cardiad Ortiz  MRN: [id number]  Admission Date: 4/26/2018    Subjective:     HPI:  Caridad Ortiz is a 53 y.o. year old female with known seizure disorder,HTN,and R convexity chronic SDH s/p seizure-related fall a few weeks ago. She was discharged 2 weeks ago.She came today for follow up CTH. CT head shows evolving chronic SDH, max thicknesses 2 cm and 8 mm MLS.She was sent to the ED for neurosurgical evaluation. She repots worsening R sided headache since discharge. She denies any AMS, weakness, numbness, speech or visual difficulties or seizure.She denies any antiplatelets/anticoagulants agents    Prescriptions Prior to Admission   Medication Sig Dispense Refill Last Dose    gabapentin (NEURONTIN) 800 MG tablet Take 800 mg by mouth 2 (two) times daily.       acetaminophen (TYLENOL) 325 MG tablet Take 2 tablets (650 mg total) by mouth every 6 (six) hours as needed.  0 Taking    amLODIPine (NORVASC) 10 MG tablet Take 10 mg by mouth once daily.   Taking    busPIRone (BUSPAR) 15 MG tablet Take 15 mg by mouth 2 (two) times daily.   Taking    calcium-vitamin D3 500 mg(1,250mg) -200 unit per tablet Take 1 tablet by mouth 2 (two) times daily with meals.   Taking    citalopram (CELEXA) 20 MG tablet Take 1 tablet (20 mg total) by mouth once daily. 30 tablet 1     folic acid (FOLVITE) 1 MG tablet Take 1 tablet (1 mg total) by mouth once daily.  0 Taking    levothyroxine (SYNTHROID) 75 MCG tablet Take 75 mcg by mouth once daily.   Taking    lisinopril 10 MG tablet Take 1 tablet (10 mg total) by mouth once daily. 30 tablet 1 6/27/2015    metoprolol tartrate (LOPRESSOR) 25 MG tablet Take 50 mg by mouth 2 (two) times daily.    Taking    mirtazapine (REMERON) 30 MG tablet Take 1 tablet (30 mg total) by mouth every evening. 30 tablet 1     nicotine (NICODERM CQ) 21 mg/24 hr Place 1 patch onto the skin once daily.  0 Taking     oxyCODONE-acetaminophen (PERCOCET)  mg per tablet Take 1 tablet by mouth every 4 to 6 hours as needed for Pain. 75 tablet 0 Taking    phenytoin (DILANTIN) 100 MG ER capsule Take by mouth 3 (three) times daily.   Taking    thiamine 100 MG tablet Take 1 tablet (100 mg total) by mouth once daily.   Taking    tiZANidine (ZANAFLEX) 4 MG tablet Take 4 mg by mouth 2 (two) times daily.   Taking       Review of patient's allergies indicates:  No Known Allergies    Past Medical History:   Diagnosis Date    Anxiety     Depression     History of psychiatric care     Hypertension     Psychiatric exam     Seizures      Past Surgical History:   Procedure Laterality Date    BACK SURGERY      TONSILLECTOMY       Family History     None        Social History Main Topics    Smoking status: Current Every Day Smoker     Packs/day: 1.00     Years: 10.00    Smokeless tobacco: Not on file    Alcohol use 0.0 oz/week     5 - 10 Cans of beer per week    Drug use: No    Sexual activity: No     Review of Systems  Objective:     Weight: 57.8 kg (127 lb 6.8 oz)  Body mass index is 21.2 kg/m².  Vital Signs (Most Recent):  Temp: 98.4 °F (36.9 °C) (04/30/18 0705)  Pulse: (!) 59 (04/30/18 0800)  Resp: 16 (04/30/18 0800)  BP: 126/85 (04/30/18 0800)  SpO2: 97 % (04/30/18 0800) Vital Signs (24h Range):  Temp:  [97.4 °F (36.3 °C)-98.8 °F (37.1 °C)] 98.4 °F (36.9 °C)  Pulse:  [59-78] 59  Resp:  [13-30] 16  SpO2:  [92 %-100 %] 97 %  BP: (107-154)/(70-96) 126/85       Date 04/30/18 0700 - 05/01/18 0659   Shift 2821-1830 1272-0627 6893-8564 24 Hour Total   I  N  T  A  K  E   P.O. 100   100    I.V.  (mL/kg) 95.8  (1.7)   95.8  (1.7)    Shift Total  (mL/kg) 195.8  (3.4)   195.8  (3.4)   O  U  T  P  U  T   Urine  (mL/kg/hr) 100   100    Drains 50   50    Shift Total  (mL/kg) 150  (2.6)   150  (2.6)   Weight (kg) 57.8 57.8 57.8 57.8                        Closed/Suction Drain 04/27/18 1344 Right;Lateral Scalp Bulb 7 Fr. (Active)   Site  Description Unable to view 4/30/2018  3:05 AM   Dressing Type [place] 4/30/2018  3:05 AM   Dressing Status Old drainage 4/30/2018  3:05 AM   Drainage Sanguineous 4/30/2018  3:05 AM   Status Other (Comment) 4/30/2018  3:05 AM   Output (mL) 50 mL 4/30/2018  7:55 AM       Physical Exam:  Nursing note and vitals reviewed.    Constitutional: She appears well-developed and well-nourished.     Eyes: Pupils are equal, round, and reactive to light. EOM are normal.     Cardiovascular: Normal rate.     Abdominal: Soft.     Psych/Behavior: She is alert. She is oriented to person, place, and time. She has a normal mood and affect.     Neurological:   AAOx3, NAD, speech fluent, good comprehension, repetition  PERRL EOMI FS TM  GOODWIN 5/5  SILT  No drift       Significant Labs:    Recent Labs  Lab 04/29/18 0340 04/29/18 2004 04/29/18 2325 04/30/18  0416   GLU 96  --   --   --  97   *  131*  < > 132* 133* 132*  132*   K 4.6  --   --   --  4.5     --   --   --  101   CO2 24  --   --   --  23   BUN 3*  --   --   --  5*   CREATININE 0.5  --   --   --  0.6   CALCIUM 8.8  --   --   --  8.6*   < > = values in this interval not displayed.    Recent Labs  Lab 04/29/18 0340 04/30/18  0416   WBC 6.42 6.11   HGB 10.1* 10.3*   HCT 30.9* 31.8*   * 362*     No results for input(s): LABPT, INR, APTT in the last 48 hours.  Microbiology Results (last 7 days)     ** No results found for the last 168 hours. **        All pertinent labs from the last 24 hours have been reviewed.    Significant Diagnostics:  CT: No results found in the last 24 hours.  MRI: No results found in the last 24 hours.    Assessment and Plan:     Seizure after head injury     53 y.o. year old female with R chronic SDH with mass effect. Now s/p edmond hole evac 4/27.    -- Neuro stable  -- Neurochecks q1h  Pull SD drain todayl stop Ppx abx  -- HOB <30  -- Maintain SBP <150.  -- Continue home Dilantin  -- Medical management per NCC.   TTF tomorrow.              Zaid Dodd MD  Neurosurgery  [facility]

## 2018-04-30 NOTE — PROGRESS NOTES
L arm layin/86   R arm laying 138/83     Sittin/80 MAP 93  Standin/88     All results reported to JACKSON Alexander and MD Guille

## 2018-04-30 NOTE — PROGRESS NOTES
Neurosurgery at bedside removing pt JESSICA sudural drain. Per MD was instructed to keep pt HOB flat for 30 minutes then gradually raising pt HOB. Will continue to monitor.

## 2018-04-30 NOTE — ASSESSMENT & PLAN NOTE
53 y.o. year old female with R chronic SDH with mass effect. Now s/p edmond hole evac 4/27.    -- Neuro stable  -- Neurochecks q1h  Pull SD drain todayl stop Ppx abx  -- HOB <30  -- Maintain SBP <150.  -- Continue home Dilantin  -- Medical management per NCC.   TTF tomorrow.

## 2018-04-30 NOTE — PLAN OF CARE
Problem: Patient Care Overview  Goal: Plan of Care Review  Outcome: Ongoing (interventions implemented as appropriate)  POC reviewed with pt at 0200. Pt verbalized understanding. Questions and concerns addressed. No acute events overnight. Pt progressing toward goals. RN will continue to monitor. See flowsheets for full assessment and VS info

## 2018-04-30 NOTE — SUBJECTIVE & OBJECTIVE
Prescriptions Prior to Admission   Medication Sig Dispense Refill Last Dose    gabapentin (NEURONTIN) 800 MG tablet Take 800 mg by mouth 2 (two) times daily.       acetaminophen (TYLENOL) 325 MG tablet Take 2 tablets (650 mg total) by mouth every 6 (six) hours as needed.  0 Taking    amLODIPine (NORVASC) 10 MG tablet Take 10 mg by mouth once daily.   Taking    busPIRone (BUSPAR) 15 MG tablet Take 15 mg by mouth 2 (two) times daily.   Taking    calcium-vitamin D3 500 mg(1,250mg) -200 unit per tablet Take 1 tablet by mouth 2 (two) times daily with meals.   Taking    citalopram (CELEXA) 20 MG tablet Take 1 tablet (20 mg total) by mouth once daily. 30 tablet 1     folic acid (FOLVITE) 1 MG tablet Take 1 tablet (1 mg total) by mouth once daily.  0 Taking    levothyroxine (SYNTHROID) 75 MCG tablet Take 75 mcg by mouth once daily.   Taking    lisinopril 10 MG tablet Take 1 tablet (10 mg total) by mouth once daily. 30 tablet 1 6/27/2015    metoprolol tartrate (LOPRESSOR) 25 MG tablet Take 50 mg by mouth 2 (two) times daily.    Taking    mirtazapine (REMERON) 30 MG tablet Take 1 tablet (30 mg total) by mouth every evening. 30 tablet 1     nicotine (NICODERM CQ) 21 mg/24 hr Place 1 patch onto the skin once daily.  0 Taking    oxyCODONE-acetaminophen (PERCOCET)  mg per tablet Take 1 tablet by mouth every 4 to 6 hours as needed for Pain. 75 tablet 0 Taking    phenytoin (DILANTIN) 100 MG ER capsule Take by mouth 3 (three) times daily.   Taking    thiamine 100 MG tablet Take 1 tablet (100 mg total) by mouth once daily.   Taking    tiZANidine (ZANAFLEX) 4 MG tablet Take 4 mg by mouth 2 (two) times daily.   Taking       Review of patient's allergies indicates:  No Known Allergies    Past Medical History:   Diagnosis Date    Anxiety     Depression     History of psychiatric care     Hypertension     Psychiatric exam     Seizures      Past Surgical History:   Procedure Laterality Date    BACK SURGERY       TONSILLECTOMY       Family History     None        Social History Main Topics    Smoking status: Current Every Day Smoker     Packs/day: 1.00     Years: 10.00    Smokeless tobacco: Not on file    Alcohol use 0.0 oz/week     5 - 10 Cans of beer per week    Drug use: No    Sexual activity: No     Review of Systems  Objective:     Weight: 57.8 kg (127 lb 6.8 oz)  Body mass index is 21.2 kg/m².  Vital Signs (Most Recent):  Temp: 98.4 °F (36.9 °C) (04/30/18 0705)  Pulse: (!) 59 (04/30/18 0800)  Resp: 16 (04/30/18 0800)  BP: 126/85 (04/30/18 0800)  SpO2: 97 % (04/30/18 0800) Vital Signs (24h Range):  Temp:  [97.4 °F (36.3 °C)-98.8 °F (37.1 °C)] 98.4 °F (36.9 °C)  Pulse:  [59-78] 59  Resp:  [13-30] 16  SpO2:  [92 %-100 %] 97 %  BP: (107-154)/(70-96) 126/85       Date 04/30/18 0700 - 05/01/18 0659   Shift 9977-2958 9673-8358 9035-5957 24 Hour Total   I  N  T  A  K  E   P.O. 100   100    I.V.  (mL/kg) 95.8  (1.7)   95.8  (1.7)    Shift Total  (mL/kg) 195.8  (3.4)   195.8  (3.4)   O  U  T  P  U  T   Urine  (mL/kg/hr) 100   100    Drains 50   50    Shift Total  (mL/kg) 150  (2.6)   150  (2.6)   Weight (kg) 57.8 57.8 57.8 57.8                        Closed/Suction Drain 04/27/18 1344 Right;Lateral Scalp Bulb 7 Fr. (Active)   Site Description Unable to view 4/30/2018  3:05 AM   Dressing Type [place] 4/30/2018  3:05 AM   Dressing Status Old drainage 4/30/2018  3:05 AM   Drainage Sanguineous 4/30/2018  3:05 AM   Status Other (Comment) 4/30/2018  3:05 AM   Output (mL) 50 mL 4/30/2018  7:55 AM       Physical Exam:  Nursing note and vitals reviewed.    Constitutional: She appears well-developed and well-nourished.     Eyes: Pupils are equal, round, and reactive to light. EOM are normal.     Cardiovascular: Normal rate.     Abdominal: Soft.     Psych/Behavior: She is alert. She is oriented to person, place, and time. She has a normal mood and affect.     Neurological:   AAOx3, NAD, speech fluent, good comprehension,  repetition  PERRL EOMI FS TM  GOODWIN 5/5  SILT  No drift       Significant Labs:    Recent Labs  Lab 04/29/18 0340 04/29/18 2004 04/29/18  2325 04/30/18  0416   GLU 96  --   --   --  97   *  131*  < > 132* 133* 132*  132*   K 4.6  --   --   --  4.5     --   --   --  101   CO2 24  --   --   --  23   BUN 3*  --   --   --  5*   CREATININE 0.5  --   --   --  0.6   CALCIUM 8.8  --   --   --  8.6*   < > = values in this interval not displayed.    Recent Labs  Lab 04/29/18 0340 04/30/18 0416   WBC 6.42 6.11   HGB 10.1* 10.3*   HCT 30.9* 31.8*   * 362*     No results for input(s): LABPT, INR, APTT in the last 48 hours.  Microbiology Results (last 7 days)     ** No results found for the last 168 hours. **        All pertinent labs from the last 24 hours have been reviewed.    Significant Diagnostics:  CT: No results found in the last 24 hours.  MRI: No results found in the last 24 hours.

## 2018-05-01 LAB
ALBUMIN SERPL BCP-MCNC: 2.7 G/DL
ALP SERPL-CCNC: 88 U/L
ALT SERPL W/O P-5'-P-CCNC: 9 U/L
ANION GAP SERPL CALC-SCNC: 7 MMOL/L
AST SERPL-CCNC: 15 U/L
BASOPHILS # BLD AUTO: 0.07 K/UL
BASOPHILS # BLD AUTO: 0.08 K/UL
BASOPHILS NFR BLD: 1 %
BASOPHILS NFR BLD: 1.1 %
BILIRUB SERPL-MCNC: 0.1 MG/DL
BUN SERPL-MCNC: 8 MG/DL
CALCIUM SERPL-MCNC: 9.1 MG/DL
CHLORIDE SERPL-SCNC: 99 MMOL/L
CO2 SERPL-SCNC: 26 MMOL/L
CREAT SERPL-MCNC: 0.5 MG/DL
DIFFERENTIAL METHOD: ABNORMAL
DIFFERENTIAL METHOD: ABNORMAL
EOSINOPHIL # BLD AUTO: 0.3 K/UL
EOSINOPHIL # BLD AUTO: 0.3 K/UL
EOSINOPHIL NFR BLD: 3.6 %
EOSINOPHIL NFR BLD: 3.9 %
ERYTHROCYTE [DISTWIDTH] IN BLOOD BY AUTOMATED COUNT: 15.8 %
ERYTHROCYTE [DISTWIDTH] IN BLOOD BY AUTOMATED COUNT: 16 %
EST. GFR  (AFRICAN AMERICAN): >60 ML/MIN/1.73 M^2
EST. GFR  (NON AFRICAN AMERICAN): >60 ML/MIN/1.73 M^2
GLUCOSE SERPL-MCNC: 96 MG/DL
HCT VFR BLD AUTO: 30.5 %
HCT VFR BLD AUTO: 30.5 %
HGB BLD-MCNC: 10 G/DL
HGB BLD-MCNC: 9.9 G/DL
IMM GRANULOCYTES # BLD AUTO: 0.03 K/UL
IMM GRANULOCYTES # BLD AUTO: 0.04 K/UL
IMM GRANULOCYTES NFR BLD AUTO: 0.4 %
IMM GRANULOCYTES NFR BLD AUTO: 0.6 %
LYMPHOCYTES # BLD AUTO: 2.1 K/UL
LYMPHOCYTES # BLD AUTO: 2.2 K/UL
LYMPHOCYTES NFR BLD: 28.9 %
LYMPHOCYTES NFR BLD: 30.4 %
MAGNESIUM SERPL-MCNC: 1.6 MG/DL
MAGNESIUM SERPL-MCNC: 1.9 MG/DL
MCH RBC QN AUTO: 29.7 PG
MCH RBC QN AUTO: 29.7 PG
MCHC RBC AUTO-ENTMCNC: 32.5 G/DL
MCHC RBC AUTO-ENTMCNC: 32.8 G/DL
MCV RBC AUTO: 91 FL
MCV RBC AUTO: 92 FL
MONOCYTES # BLD AUTO: 0.6 K/UL
MONOCYTES # BLD AUTO: 0.6 K/UL
MONOCYTES NFR BLD: 7.8 %
MONOCYTES NFR BLD: 8.2 %
NEUTROPHILS # BLD AUTO: 3.9 K/UL
NEUTROPHILS # BLD AUTO: 4.4 K/UL
NEUTROPHILS NFR BLD: 55.9 %
NEUTROPHILS NFR BLD: 58.2 %
NRBC BLD-RTO: 0 /100 WBC
NRBC BLD-RTO: 0 /100 WBC
PHOSPHATE SERPL-MCNC: 3.8 MG/DL
PLATELET # BLD AUTO: 348 K/UL
PLATELET # BLD AUTO: 349 K/UL
PMV BLD AUTO: 9.3 FL
PMV BLD AUTO: 9.6 FL
POTASSIUM SERPL-SCNC: 4.8 MMOL/L
PROT SERPL-MCNC: 5.8 G/DL
RBC # BLD AUTO: 3.33 M/UL
RBC # BLD AUTO: 3.37 M/UL
SODIUM SERPL-SCNC: 132 MMOL/L
SODIUM SERPL-SCNC: 134 MMOL/L
WBC # BLD AUTO: 6.93 K/UL
WBC # BLD AUTO: 7.55 K/UL

## 2018-05-01 PROCEDURE — 97803 MED NUTRITION INDIV SUBSEQ: CPT

## 2018-05-01 PROCEDURE — 25000003 PHARM REV CODE 250: Performed by: ANESTHESIOLOGY

## 2018-05-01 PROCEDURE — 25000003 PHARM REV CODE 250: Performed by: NURSE PRACTITIONER

## 2018-05-01 PROCEDURE — A4216 STERILE WATER/SALINE, 10 ML: HCPCS | Performed by: NURSE PRACTITIONER

## 2018-05-01 PROCEDURE — 84295 ASSAY OF SERUM SODIUM: CPT | Mod: 91

## 2018-05-01 PROCEDURE — 94640 AIRWAY INHALATION TREATMENT: CPT

## 2018-05-01 PROCEDURE — 80053 COMPREHEN METABOLIC PANEL: CPT

## 2018-05-01 PROCEDURE — 63600175 PHARM REV CODE 636 W HCPCS: Performed by: PHYSICIAN ASSISTANT

## 2018-05-01 PROCEDURE — S4991 NICOTINE PATCH NONLEGEND: HCPCS | Performed by: STUDENT IN AN ORGANIZED HEALTH CARE EDUCATION/TRAINING PROGRAM

## 2018-05-01 PROCEDURE — 63600175 PHARM REV CODE 636 W HCPCS: Performed by: STUDENT IN AN ORGANIZED HEALTH CARE EDUCATION/TRAINING PROGRAM

## 2018-05-01 PROCEDURE — 25000003 PHARM REV CODE 250: Performed by: STUDENT IN AN ORGANIZED HEALTH CARE EDUCATION/TRAINING PROGRAM

## 2018-05-01 PROCEDURE — 25000003 PHARM REV CODE 250: Performed by: PHYSICIAN ASSISTANT

## 2018-05-01 PROCEDURE — 20000000 HC ICU ROOM

## 2018-05-01 PROCEDURE — 99233 SBSQ HOSP IP/OBS HIGH 50: CPT | Mod: ,,, | Performed by: PSYCHIATRY & NEUROLOGY

## 2018-05-01 PROCEDURE — 85025 COMPLETE CBC W/AUTO DIFF WBC: CPT

## 2018-05-01 PROCEDURE — 84295 ASSAY OF SERUM SODIUM: CPT

## 2018-05-01 PROCEDURE — 84100 ASSAY OF PHOSPHORUS: CPT

## 2018-05-01 PROCEDURE — 94761 N-INVAS EAR/PLS OXIMETRY MLT: CPT

## 2018-05-01 PROCEDURE — 83735 ASSAY OF MAGNESIUM: CPT

## 2018-05-01 PROCEDURE — 99900035 HC TECH TIME PER 15 MIN (STAT)

## 2018-05-01 PROCEDURE — 25000242 PHARM REV CODE 250 ALT 637 W/ HCPCS: Performed by: PSYCHIATRY & NEUROLOGY

## 2018-05-01 RX ORDER — IPRATROPIUM BROMIDE AND ALBUTEROL SULFATE 2.5; .5 MG/3ML; MG/3ML
3 SOLUTION RESPIRATORY (INHALATION) EVERY 6 HOURS PRN
Status: DISCONTINUED | OUTPATIENT
Start: 2018-05-01 | End: 2018-05-03 | Stop reason: HOSPADM

## 2018-05-01 RX ORDER — FLUTICASONE FUROATE AND VILANTEROL 100; 25 UG/1; UG/1
1 POWDER RESPIRATORY (INHALATION) 2 TIMES DAILY
Status: DISCONTINUED | OUTPATIENT
Start: 2018-05-01 | End: 2018-05-01

## 2018-05-01 RX ORDER — AMOXICILLIN 250 MG
1 CAPSULE ORAL 2 TIMES DAILY
Status: DISCONTINUED | OUTPATIENT
Start: 2018-05-01 | End: 2018-05-03 | Stop reason: HOSPADM

## 2018-05-01 RX ORDER — DEXAMETHASONE 4 MG/1
4 TABLET ORAL ONCE
Status: COMPLETED | OUTPATIENT
Start: 2018-05-01 | End: 2018-05-01

## 2018-05-01 RX ORDER — FLUTICASONE FUROATE AND VILANTEROL 100; 25 UG/1; UG/1
1 POWDER RESPIRATORY (INHALATION) DAILY
Status: DISCONTINUED | OUTPATIENT
Start: 2018-05-01 | End: 2018-05-03 | Stop reason: HOSPADM

## 2018-05-01 RX ORDER — GABAPENTIN 300 MG/1
900 CAPSULE ORAL EVERY 6 HOURS
Status: DISCONTINUED | OUTPATIENT
Start: 2018-05-01 | End: 2018-05-03 | Stop reason: HOSPADM

## 2018-05-01 RX ORDER — FLUTICASONE FUROATE AND VILANTEROL 100; 25 UG/1; UG/1
1 POWDER RESPIRATORY (INHALATION) DAILY
Status: DISCONTINUED | OUTPATIENT
Start: 2018-05-01 | End: 2018-05-01

## 2018-05-01 RX ADMIN — Medication 3 ML: at 02:05

## 2018-05-01 RX ADMIN — GABAPENTIN 900 MG: 300 CAPSULE ORAL at 11:05

## 2018-05-01 RX ADMIN — MAGNESIUM OXIDE TAB 400 MG (241.3 MG ELEMENTAL MG) 800 MG: 400 (241.3 MG) TAB at 08:05

## 2018-05-01 RX ADMIN — PHENYTOIN SODIUM 100 MG: 100 CAPSULE ORAL at 03:05

## 2018-05-01 RX ADMIN — OXYCODONE HYDROCHLORIDE 5 MG: 5 TABLET ORAL at 03:05

## 2018-05-01 RX ADMIN — GABAPENTIN 900 MG: 300 CAPSULE ORAL at 05:05

## 2018-05-01 RX ADMIN — SODIUM CHLORIDE TAB 1 GM 3 G: 1 TAB at 09:05

## 2018-05-01 RX ADMIN — STANDARDIZED SENNA CONCENTRATE AND DOCUSATE SODIUM 1 TABLET: 8.6; 5 TABLET, FILM COATED ORAL at 10:05

## 2018-05-01 RX ADMIN — DEXAMETHASONE 4 MG: 4 TABLET ORAL at 10:05

## 2018-05-01 RX ADMIN — OXYCODONE HYDROCHLORIDE 5 MG: 5 TABLET ORAL at 09:05

## 2018-05-01 RX ADMIN — FLUTICASONE FUROATE AND VILANTEROL TRIFENATATE 1 PUFF: 100; 25 POWDER RESPIRATORY (INHALATION) at 10:05

## 2018-05-01 RX ADMIN — METOPROLOL TARTRATE 75 MG: 50 TABLET ORAL at 03:05

## 2018-05-01 RX ADMIN — Medication 3 ML: at 05:05

## 2018-05-01 RX ADMIN — LEVOTHYROXINE SODIUM 75 MCG: 75 TABLET ORAL at 05:05

## 2018-05-01 RX ADMIN — NICOTINE 1 PATCH: 21 PATCH, EXTENDED RELEASE TRANSDERMAL at 09:05

## 2018-05-01 RX ADMIN — SODIUM CHLORIDE TAB 1 GM 2 G: 1 TAB at 08:05

## 2018-05-01 RX ADMIN — HEPARIN SODIUM 5000 UNITS: 5000 INJECTION, SOLUTION INTRAVENOUS; SUBCUTANEOUS at 09:05

## 2018-05-01 RX ADMIN — ACETAMINOPHEN 650 MG: 325 TABLET ORAL at 04:05

## 2018-05-01 RX ADMIN — HEPARIN SODIUM 5000 UNITS: 5000 INJECTION, SOLUTION INTRAVENOUS; SUBCUTANEOUS at 02:05

## 2018-05-01 RX ADMIN — STANDARDIZED SENNA CONCENTRATE AND DOCUSATE SODIUM 1 TABLET: 8.6; 5 TABLET, FILM COATED ORAL at 09:05

## 2018-05-01 RX ADMIN — PHENYTOIN SODIUM 100 MG: 100 CAPSULE ORAL at 08:05

## 2018-05-01 RX ADMIN — GABAPENTIN 900 MG: 300 CAPSULE ORAL at 12:05

## 2018-05-01 RX ADMIN — ACETAMINOPHEN 650 MG: 325 TABLET ORAL at 01:05

## 2018-05-01 RX ADMIN — ACETAMINOPHEN 650 MG: 325 TABLET ORAL at 07:05

## 2018-05-01 RX ADMIN — SODIUM CHLORIDE TAB 1 GM 3 G: 1 TAB at 03:05

## 2018-05-01 RX ADMIN — PHENYTOIN SODIUM 100 MG: 100 CAPSULE ORAL at 09:05

## 2018-05-01 RX ADMIN — Medication 3 ML: at 09:05

## 2018-05-01 RX ADMIN — HEPARIN SODIUM 5000 UNITS: 5000 INJECTION, SOLUTION INTRAVENOUS; SUBCUTANEOUS at 05:05

## 2018-05-01 RX ADMIN — METOPROLOL TARTRATE 75 MG: 50 TABLET ORAL at 05:05

## 2018-05-01 RX ADMIN — METOPROLOL TARTRATE 75 MG: 50 TABLET ORAL at 09:05

## 2018-05-01 RX ADMIN — MAGNESIUM OXIDE TAB 400 MG (241.3 MG ELEMENTAL MG) 800 MG: 400 (241.3 MG) TAB at 05:05

## 2018-05-01 NOTE — ASSESSMENT & PLAN NOTE
Contributing Nutrition Diagnosis  Inadequate energy intake    Related to (etiology):   NPO for sx    Signs and Symptoms (as evidenced by):   <85% of EEN/EPN being met    Interventions/Recommendations (treatment strategy):  See recs    Nutrition Diagnosis Status:   Resolved

## 2018-05-01 NOTE — ASSESSMENT & PLAN NOTE
53 y.o. year old female with R chronic SDH with mass effect. Now s/p edmond hole evac 4/27.    -- Neuro stable  -- Neurochecks q1h  F/u CTH this AM.   HOb as tolerated.   -- Maintain SBP <160.  -- Continue home Dilantin  -- Medical management per NCC.     TTF if CTH this AM ok.

## 2018-05-01 NOTE — SUBJECTIVE & OBJECTIVE
Prescriptions Prior to Admission   Medication Sig Dispense Refill Last Dose    gabapentin (NEURONTIN) 800 MG tablet Take 800 mg by mouth 2 (two) times daily.       acetaminophen (TYLENOL) 325 MG tablet Take 2 tablets (650 mg total) by mouth every 6 (six) hours as needed.  0 Taking    amLODIPine (NORVASC) 10 MG tablet Take 10 mg by mouth once daily.   Taking    busPIRone (BUSPAR) 15 MG tablet Take 15 mg by mouth 2 (two) times daily.   Taking    calcium-vitamin D3 500 mg(1,250mg) -200 unit per tablet Take 1 tablet by mouth 2 (two) times daily with meals.   Taking    citalopram (CELEXA) 20 MG tablet Take 1 tablet (20 mg total) by mouth once daily. 30 tablet 1     folic acid (FOLVITE) 1 MG tablet Take 1 tablet (1 mg total) by mouth once daily.  0 Taking    levothyroxine (SYNTHROID) 75 MCG tablet Take 75 mcg by mouth once daily.   Taking    lisinopril 10 MG tablet Take 1 tablet (10 mg total) by mouth once daily. 30 tablet 1 6/27/2015    metoprolol tartrate (LOPRESSOR) 25 MG tablet Take 50 mg by mouth 2 (two) times daily.    Taking    mirtazapine (REMERON) 30 MG tablet Take 1 tablet (30 mg total) by mouth every evening. 30 tablet 1     nicotine (NICODERM CQ) 21 mg/24 hr Place 1 patch onto the skin once daily.  0 Taking    oxyCODONE-acetaminophen (PERCOCET)  mg per tablet Take 1 tablet by mouth every 4 to 6 hours as needed for Pain. 75 tablet 0 Taking    phenytoin (DILANTIN) 100 MG ER capsule Take by mouth 3 (three) times daily.   Taking    thiamine 100 MG tablet Take 1 tablet (100 mg total) by mouth once daily.   Taking    tiZANidine (ZANAFLEX) 4 MG tablet Take 4 mg by mouth 2 (two) times daily.   Taking       Review of patient's allergies indicates:  No Known Allergies    Past Medical History:   Diagnosis Date    Anxiety     Depression     History of psychiatric care     Hypertension     Psychiatric exam     Seizures      Past Surgical History:   Procedure Laterality Date    BACK SURGERY       TONSILLECTOMY       Family History     None        Social History Main Topics    Smoking status: Current Every Day Smoker     Packs/day: 1.00     Years: 10.00    Smokeless tobacco: Not on file    Alcohol use 0.0 oz/week     5 - 10 Cans of beer per week    Drug use: No    Sexual activity: No     Review of Systems    Objective:     Weight: 57.8 kg (127 lb 6.8 oz)  Body mass index is 21.2 kg/m².  Vital Signs (Most Recent):  Temp: 98 °F (36.7 °C) (05/01/18 0701)  Pulse: 72 (05/01/18 0906)  Resp: (!) 36 (05/01/18 0906)  BP: 133/84 (05/01/18 0906)  SpO2: 96 % (05/01/18 0906) Vital Signs (24h Range):  Temp:  [97.3 °F (36.3 °C)-98.5 °F (36.9 °C)] 98 °F (36.7 °C)  Pulse:  [63-81] 72  Resp:  [11-36] 36  SpO2:  [93 %-98 %] 96 %  BP: (105-151)/(69-90) 133/84       Date 05/01/18 0700 - 05/02/18 0659   Shift 2278-9911 7278-5450 0937-3437 24 Hour Total   I  N  T  A  K  E   P.O. 100   100    Shift Total  (mL/kg) 100  (1.7)   100  (1.7)   O  U  T  P  U  T   Urine  (mL/kg/hr) 300   300    Shift Total  (mL/kg) 300  (5.2)   300  (5.2)   Weight (kg) 57.8 57.8 57.8 57.8                        Closed/Suction Drain 04/27/18 1344 Right;Lateral Scalp Bulb 7 Fr. (Active)   Site Description Unable to view 4/30/2018  3:05 AM   Dressing Type [place] 4/30/2018  3:05 AM   Dressing Status Old drainage 4/30/2018  3:05 AM   Drainage Sanguineous 4/30/2018  3:05 AM   Status Other (Comment) 4/30/2018  3:05 AM   Output (mL) 50 mL 4/30/2018  7:55 AM       Physical Exam:  Nursing note and vitals reviewed.    Constitutional: She appears well-developed and well-nourished.     Eyes: Pupils are equal, round, and reactive to light. EOM are normal.     Cardiovascular: Normal rate.     Abdominal: Soft.     Psych/Behavior: She is alert. She is oriented to person, place, and time. She has a normal mood and affect.     Neurological:   AAOx3, NAD, speech fluent, good comprehension, repetition  PERRL EOMI FS TM  GOODWIN 5/5  SILT  No drift       Significant  Labs:    Recent Labs  Lab 04/30/18  0416  04/30/18  2110 05/01/18  0234 05/01/18  0808   GLU 97  --   --  96  --    *  132*  < > 132* 132* 132*   K 4.5  --   --  4.8  --      --   --  99  --    CO2 23  --   --  26  --    BUN 5*  --   --  8  --    CREATININE 0.6  --   --  0.5  --    CALCIUM 8.6*  --   --  9.1  --    MG  --   --   --  1.6  --    < > = values in this interval not displayed.    Recent Labs  Lab 04/30/18 0416 05/01/18  0106 05/01/18  0234   WBC 6.11 7.55 6.93   HGB 10.3* 9.9* 10.0*   HCT 31.8* 30.5* 30.5*   * 349 348     No results for input(s): LABPT, INR, APTT in the last 48 hours.  Microbiology Results (last 7 days)     ** No results found for the last 168 hours. **        All pertinent labs from the last 24 hours have been reviewed.    Significant Diagnostics:  CT: No results found in the last 24 hours.  MRI: No results found in the last 24 hours.

## 2018-05-01 NOTE — PROGRESS NOTES
[facility]  Neurocritical Care  Progress Note    Admit Date: 4/26/2018  Service Date: 04/30/2018  Length of Stay: 4    Subjective:     Chief Complaint: Chronic subdural hematoma    History of Present Illness: Caridad Ortiz is a  54y/o F w/ hx seizures on dilantin, recent head trauma end of March w/ R subdural, HTN, hypothyroidism  Who presents to [facility] for enlarging SDH. She was sent from neurosurgery clinic for large R SDH. Patient says since discharge she has been having gradually worsening headache w/ nausea and malaise. After clinic visit today had outpatient CT scan that showed SDH had increased from 7mm to 2.1 cm w/ 1cm leftward mass effect and possible signs of subfalcine herniation. She was advised to present emergently to ED for evacuation. She is being admitted to [facility] for a higher level of care.     Hospital Course: 4/26: Admit NCC   4/30: No events. SD drain was D/C today. CT heard in the AM. Still some headache.    Interval History:  >4 elements OR status of 3 inpatient conditions  No events. SD drain was D/C today. CT heard in the AM. Still some headache.  Review of Systems   Constitutional: Negative.    HENT: Negative.    Eyes: Negative.    Respiratory: Negative.    Cardiovascular: Negative.    Gastrointestinal: Negative.    Endocrine: Negative.    Genitourinary: Negative.    Musculoskeletal: Negative.    Skin: Negative.    Neurological: Positive for headaches.   Hematological: Negative.    Psychiatric/Behavioral: Negative.      2 systems OR Unable to obtain a complete ROS due to level of consciousness.  Objective:     Vitals:  Temp: 97.3 °F (36.3 °C)  Pulse: 74  Rhythm: normal sinus rhythm  BP: 134/80  MAP (mmHg): 98  Resp: (!) 23  SpO2: 95 %  O2 Device (Oxygen Therapy): room air    Temp  Min: 97.3 °F (36.3 °C)  Max: 98.5 °F (36.9 °C)  Pulse  Min: 59  Max: 81  BP  Min: 107/75  Max: 151/87  MAP (mmHg)  Min: 84  Max: 112  Resp  Min: 11  Max: 30  SpO2  Min: 93 %  Max: 100 %    04/30  0701 - 05/01 0700  In: 650 [P.O.:100; I.V.:550]  Out: 1320 [Urine:1270; Drains:50]   Unmeasured Output  Urine Occurrence: 1  Stool Occurrence: 0       Physical Exam  Unable to test gait due to level of consciousness.  General   HEENT: S/P R-crani.  Chest Heart RRR / Lungs Clear to auscultation  Abdomen: Soft nontender + BS  Extremities: OK distal pulses.  Skin: UK  Neurological Exam:  MS; Alert, oriented to P/T/P/R, No language abnormalities. Normal mood.  CN: II-XII UK  Motor: LUE   5/5 / RUE 5 /5  Tone normal bilaterally             LLE  5 /5 /  RLE  5/5  Tone normal bilaterally  Sensory: LT/PP/T/ Vibration UK                 Complex sensory modalities: not tested  DTR:  normal throughout.  Coordination /Fine motor:   Gait: Not tested.  Meningeal signs: Absent  Medications:  Continuous Scheduled  albuterol-ipratropium 2.5mg-0.5mg/3mL 3 mL Q6H   gabapentin 900 mg Q8H   heparin (porcine) 5,000 Units Q8H   levothyroxine 75 mcg Before breakfast   metoprolol tartrate 75 mg Q8H   nicotine 1 patch Daily   phenytoin 100 mg TID   sodium chloride 0.9% 3 mL Q8H   sodium chloride 2 g TID   PRN  acetaminophen 650 mg Q6H PRN   labetalol 10 mg Q4H PRN   magnesium oxide 800 mg PRN   magnesium oxide 800 mg PRN   ondansetron 8 mg Q8H PRN   oxyCODONE 5 mg Q6H PRN   potassium chloride 10% 40 mEq PRN   potassium chloride 10% 40 mEq PRN   potassium chloride 10% 60 mEq PRN   potassium, sodium phosphates 2 packet PRN   potassium, sodium phosphates 2 packet PRN   potassium, sodium phosphates 2 packet PRN     Today I personally reviewed pertinent medications, lines/drains/airways, imaging, cardiology, lab results, microbiology results, notably:    Diet  Diet Adult Regular (IDDSI Level 7) Fluid - 800mL  CMP:   Recent Labs  Lab 04/30/18  0416 04/30/18  0815   CALCIUM 8.6*  --    ALBUMIN 2.6*  --    PROT 5.6*  --    *  132* 131*   K 4.5  --    CO2 23  --      --    BUN 5*  --    CREATININE 0.6  --    ALKPHOS 85  --    ALT 7*   --    AST 12  --    BILITOT 0.1  --       BMP:   Recent Labs  Lab 04/30/18 0416 04/30/18  0815   *  132* 131*   K 4.5  --      --    CO2 23  --    BUN 5*  --    CREATININE 0.6  --    CALCIUM 8.6*  --       CBC:   Recent Labs  Lab 04/30/18 0416   WBC 6.11   RBC 3.46*   HGB 10.3*   HCT 31.8*   *   MCV 92   MCH 29.8   MCHC 32.4      Lipid Panel:   Recent Labs  Lab 04/26/18 2037   CHOL 201*   LDLCALC 121.4   HDL 49   TRIG 153*      Coagulation:   Recent Labs  Lab 04/26/18 2037   INR 1.0   APTT 26.1      Platelet Aggregation Study: No results for input(s): PLTAGG, PLTAGINTERP, PLTAGREGLACO, ADPPLTAGGREG in the last 168 hours.  Hgb A1C:   Recent Labs  Lab 04/26/18 2037   HGBA1C 4.5      TSH:   Recent Labs  Lab 04/26/18 2037   TSH 3.584      No results for input(s): PH, PCO2, PO2, HCO3, POCSATURATED, BE in the last 24 hours.              Assessment/Plan:     No new Assessment & Plan notes have been filed under this hospital service since the last note was generated.  Service: Neuro Critical Care      Active Problem List:   1.Right  Subacute Large SDH S/p surgical evacuation S/D SD drain placement and removal.  2. Secondary sub falcine and  transtentorial herniation  3. Anxiety / Depression prior psychiatric care.              4. Epilepsy  5. HTN  6. Mild left atrial enlargement.   7. SIADH hyponatremia   8. Hypothyroidism.    Assessment / Plan:     Neuro:  -Neurontin 900mg q 8hrs  -Bnavssyp363ts tid.  D/C Fentanyl.  Oxycodone PRN  Tylenol PRN  -PT/OT  -NSU CT head in the AM.  Resp:  -NC 2Lt O2  -Dual nebs q 6hrs  -IS.  CV: TTE: LAE. Keep SBP <=160 mmHg.  -Lopressor 75mg q8hrs  -Orthostatic BP  -Check BP on both arms.  -Check orthostatic BP.  IVF/nutrition/Renal/GI: Serum Na 132  -PO diet with 2Lt fluid restriction Gatorade only.  -D/C NS  -Serum Na q 4hrs  -Salt tablets 2 gr q 8hrs  -BR  Hem / ID:  -D/C Cefazoline.  Endo:  -Levothyroxine 75 mcg qd  -Free T4.  -follow serum a q  4hrs.  Prophylaxis:  -SC Heparin in 6 hrs from pulling the drain. 5000 U q8hrs.  -SCDs.  Advance Directives and Disposition:    Full Code. Possible transfer tomorrow.           Uninterrupted level 3  Follow-up /Counseling Time (not including procedures):  = 35 min                Activity Orders          Out of bed to chair up to 3x daily starting at 04/27 0800    Toilet out of bed or at bedside commode starting at 04/26 2104    Commode at bedside starting at 04/26 2104        Full Code    Tuan Han MD  Neurocritical Care  [facility]

## 2018-05-01 NOTE — SUBJECTIVE & OBJECTIVE
Interval History:  >4 elements OR status of 3 inpatient conditions  No events. SD drain was D/C today. CT heard in the AM. Still some headache.  Review of Systems   Constitutional: Negative.    HENT: Negative.    Eyes: Negative.    Respiratory: Negative.    Cardiovascular: Negative.    Gastrointestinal: Negative.    Endocrine: Negative.    Genitourinary: Negative.    Musculoskeletal: Negative.    Skin: Negative.    Neurological: Positive for headaches.   Hematological: Negative.    Psychiatric/Behavioral: Negative.      2 systems OR Unable to obtain a complete ROS due to level of consciousness.  Objective:     Vitals:  Temp: 97.3 °F (36.3 °C)  Pulse: 74  Rhythm: normal sinus rhythm  BP: 134/80  MAP (mmHg): 98  Resp: (!) 23  SpO2: 95 %  O2 Device (Oxygen Therapy): room air    Temp  Min: 97.3 °F (36.3 °C)  Max: 98.5 °F (36.9 °C)  Pulse  Min: 59  Max: 81  BP  Min: 107/75  Max: 151/87  MAP (mmHg)  Min: 84  Max: 112  Resp  Min: 11  Max: 30  SpO2  Min: 93 %  Max: 100 %    04/30 0701 - 05/01 0700  In: 650 [P.O.:100; I.V.:550]  Out: 1320 [Urine:1270; Drains:50]   Unmeasured Output  Urine Occurrence: 1  Stool Occurrence: 0       Physical Exam  Unable to test gait due to level of consciousness.  General   HEENT: S/P R-crani.  Chest Heart RRR / Lungs Clear to auscultation  Abdomen: Soft nontender + BS  Extremities: OK distal pulses.  Skin: UK  Neurological Exam:  MS; Alert, oriented to P/T/P/R, No language abnormalities. Normal mood.  CN: II-XII   Motor: LUE   5/5 / RUE 5 /5  Tone normal bilaterally             LLE  5 /5 /  RLE  5/5  Tone normal bilaterally  Sensory: LT/PP/T/ Vibration                  Complex sensory modalities: not tested  DTR:  normal throughout.  Coordination /Fine motor:   Gait: Not tested.  Meningeal signs: Absent  Medications:  Continuous Scheduled  albuterol-ipratropium 2.5mg-0.5mg/3mL 3 mL Q6H   gabapentin 900 mg Q8H   heparin (porcine) 5,000 Units Q8H   levothyroxine 75 mcg Before breakfast    metoprolol tartrate 75 mg Q8H   nicotine 1 patch Daily   phenytoin 100 mg TID   sodium chloride 0.9% 3 mL Q8H   sodium chloride 2 g TID   PRN  acetaminophen 650 mg Q6H PRN   labetalol 10 mg Q4H PRN   magnesium oxide 800 mg PRN   magnesium oxide 800 mg PRN   ondansetron 8 mg Q8H PRN   oxyCODONE 5 mg Q6H PRN   potassium chloride 10% 40 mEq PRN   potassium chloride 10% 40 mEq PRN   potassium chloride 10% 60 mEq PRN   potassium, sodium phosphates 2 packet PRN   potassium, sodium phosphates 2 packet PRN   potassium, sodium phosphates 2 packet PRN     Today I personally reviewed pertinent medications, lines/drains/airways, imaging, cardiology, lab results, microbiology results, notably:    Diet  Diet Adult Regular (IDDSI Level 7) Fluid - 800mL  CMP:   Recent Labs  Lab 04/30/18 0416 04/30/18 0815   CALCIUM 8.6*  --    ALBUMIN 2.6*  --    PROT 5.6*  --    *  132* 131*   K 4.5  --    CO2 23  --      --    BUN 5*  --    CREATININE 0.6  --    ALKPHOS 85  --    ALT 7*  --    AST 12  --    BILITOT 0.1  --       BMP:   Recent Labs  Lab 04/30/18 0416 04/30/18 0815   *  132* 131*   K 4.5  --      --    CO2 23  --    BUN 5*  --    CREATININE 0.6  --    CALCIUM 8.6*  --       CBC:   Recent Labs  Lab 04/30/18 0416   WBC 6.11   RBC 3.46*   HGB 10.3*   HCT 31.8*   *   MCV 92   MCH 29.8   MCHC 32.4      Lipid Panel:   Recent Labs  Lab 04/26/18 2037   CHOL 201*   LDLCALC 121.4   HDL 49   TRIG 153*      Coagulation:   Recent Labs  Lab 04/26/18 2037   INR 1.0   APTT 26.1      Platelet Aggregation Study: No results for input(s): PLTAGG, PLTAGINTERP, PLTAGREGLACO, ADPPLTAGGREG in the last 168 hours.  Hgb A1C:   Recent Labs  Lab 04/26/18 2037   HGBA1C 4.5      TSH:   Recent Labs  Lab 04/26/18 2037   TSH 3.584      No results for input(s): PH, PCO2, PO2, HCO3, POCSATURATED, BE in the last 24 hours.

## 2018-05-01 NOTE — PLAN OF CARE
Problem: Patient Care Overview  Goal: Plan of Care Review  Outcome: Ongoing (interventions implemented as appropriate)  POC reviewed with pt and family at 1400. Pt verbalized understanding. Questions and concerns addressed. No acute events today. Pt progressing toward goals. CT scan of head was done today, patient still has the headache and PRN pain medications is being given. Will continue to monitor. See flowsheets for full assessment and VS info.

## 2018-05-01 NOTE — PLAN OF CARE
Problem: Patient Care Overview  Goal: Plan of Care Review  Outcome: Ongoing (interventions implemented as appropriate)  POC reviewed with pt at 0500. Pt verbalized understanding. Questions and concerns addressed.Pt rates pain a 7 on scale of 0-10.  Pt has little relief after administration of pain medications (tylenol, oxycodone).  Ice pack placed on incision site and staggered medication administration tactic used for pain control.  Scheduled CT scan canceled per MD order.   No acute events overnight. Pt progressing toward goals. Will continue to monitor. See flowsheets for full assessment and VS info

## 2018-05-01 NOTE — PROGRESS NOTES
" [facility]  Adult Nutrition  Progress Note    SUMMARY       Recommendations    Recommendation/Intervention:   Continue Regular diet. No OS indicated at this time. Pt consuming 100% of meals.     RD to monitor.    Goals: Diet advancement or TF initiation w/in 48 hrs  Nutrition Goal Status: goal met  Communication of RD Recs: discussed on rounds    Reason for Assessment    Reason for Assessment: RD follow-up  Diagnosis: hemorrhage (chronic SDH)  Relevant Medical History: seizures, head trauma in Mar w/ SDH, HTN  Interdisciplinary Rounds: attended  General Information Comments: Pt's diet advanced. Tolerating 100% of meals. Pt requesting salt packets on tray.  Nutrition Discharge Planning: adequate po intake for optimal nutrition    Nutrition Risk Screen    Nutrition Risk Screen: no indicators present    Nutrition/Diet History    Patient Reported Diet/Restrictions/Preferences: general  Food Preferences: None reported  Do you have any cultural, spiritual, Hinduism conflicts, given your current situation?: no  Factors Affecting Nutritional Intake: None identified at this time    Anthropometrics    Temp: 98 °F (36.7 °C)  Height Method: Stated  Height: 5' 5" (165.1 cm)  Height (inches): 65 in  Weight Method: Bed Scale  Weight: 57.8 kg (127 lb 6.8 oz)  Weight (lb): 127.43 lb  Ideal Body Weight (IBW), Female: 125 lb  % Ideal Body Weight, Female (lb): 101.94 lb  BMI (Calculated): 21.2  BMI Grade: 18.5-24.9 - normal       Lab/Procedures/Meds    Pertinent Labs Reviewed: reviewed  Pertinent Labs Comments: noted  Pertinent Medications Reviewed: reviewed  Pertinent Medications Comments: noted    Physical Findings/Assessment    Overall Physical Appearance: nourished  Oral/Mouth Cavity: tooth/teeth missing  Skin: intact    Estimated/Assessed Needs    Weight Used For Calorie Calculations: 57.8 kg (127 lb 6.8 oz)  Energy Calorie Requirements (kcal): 5552-6607  Energy Need Method: Kcal/kg (25-30 cals/kg)  Protein " Requirements: 70-75 gms (1.2-1.3 gms/kg)  Weight Used For Protein Calculations: 57.8 kg (127 lb 6.8 oz)        RDA Method (mL): 1445         Nutrition Prescription Ordered    Current Diet Order: regular  Nutrition Order Comments: 1500mL fluid restriction    Evaluation of Received Nutrient/Fluid Intake    % Intake of Estimated Energy Needs: 75 - 100 %  % Meal Intake: 75 - 100 %    Nutrition Risk    Level of Risk/Frequency of Follow-up:  (f/u 1x/week)     Assessment and Plan    * Chronic subdural hematoma    Contributing Nutrition Diagnosis  Inadequate energy intake    Related to (etiology):   NPO for sx    Signs and Symptoms (as evidenced by):   <85% of EEN/EPN being met    Interventions/Recommendations (treatment strategy):  See recs    Nutrition Diagnosis Status:   Resolved               Monitor and Evaluation    Food and Nutrient Intake: energy intake, food and beverage intake  Food and Nutrient Adminstration: diet order  Anthropometric Measurements: weight, weight change  Biochemical Data, Medical Tests and Procedures: electrolyte and renal panel, glucose/endocrine profile  Nutrition-Focused Physical Findings: overall appearance     Nutrition Follow-Up    RD Follow-up?: Yes

## 2018-05-02 LAB
ALBUMIN SERPL BCP-MCNC: 2.7 G/DL
ALP SERPL-CCNC: 84 U/L
ALT SERPL W/O P-5'-P-CCNC: 8 U/L
ANION GAP SERPL CALC-SCNC: 8 MMOL/L
AST SERPL-CCNC: 13 U/L
BASOPHILS # BLD AUTO: 0.06 K/UL
BASOPHILS NFR BLD: 0.9 %
BILIRUB SERPL-MCNC: 0.1 MG/DL
BUN SERPL-MCNC: 7 MG/DL
CALCIUM SERPL-MCNC: 9.2 MG/DL
CHLORIDE SERPL-SCNC: 102 MMOL/L
CO2 SERPL-SCNC: 23 MMOL/L
CREAT SERPL-MCNC: 0.6 MG/DL
DIFFERENTIAL METHOD: ABNORMAL
EOSINOPHIL # BLD AUTO: 0.3 K/UL
EOSINOPHIL NFR BLD: 4.6 %
ERYTHROCYTE [DISTWIDTH] IN BLOOD BY AUTOMATED COUNT: 15.9 %
EST. GFR  (AFRICAN AMERICAN): >60 ML/MIN/1.73 M^2
EST. GFR  (NON AFRICAN AMERICAN): >60 ML/MIN/1.73 M^2
GLUCOSE SERPL-MCNC: 107 MG/DL
HCT VFR BLD AUTO: 29.7 %
HGB BLD-MCNC: 9.7 G/DL
IMM GRANULOCYTES # BLD AUTO: 0.03 K/UL
IMM GRANULOCYTES NFR BLD AUTO: 0.5 %
LYMPHOCYTES # BLD AUTO: 2.3 K/UL
LYMPHOCYTES NFR BLD: 36.7 %
MCH RBC QN AUTO: 29.5 PG
MCHC RBC AUTO-ENTMCNC: 32.7 G/DL
MCV RBC AUTO: 90 FL
MONOCYTES # BLD AUTO: 0.5 K/UL
MONOCYTES NFR BLD: 7.6 %
NEUTROPHILS # BLD AUTO: 3.1 K/UL
NEUTROPHILS NFR BLD: 49.7 %
NRBC BLD-RTO: 0 /100 WBC
PLATELET # BLD AUTO: 326 K/UL
PMV BLD AUTO: 9.1 FL
POTASSIUM SERPL-SCNC: 4.7 MMOL/L
PROT SERPL-MCNC: 5.7 G/DL
RBC # BLD AUTO: 3.29 M/UL
SODIUM SERPL-SCNC: 133 MMOL/L
SODIUM SERPL-SCNC: 133 MMOL/L
SODIUM SERPL-SCNC: 134 MMOL/L
SODIUM SERPL-SCNC: 134 MMOL/L
WBC # BLD AUTO: 6.32 K/UL

## 2018-05-02 PROCEDURE — 84295 ASSAY OF SERUM SODIUM: CPT

## 2018-05-02 PROCEDURE — 25000003 PHARM REV CODE 250: Performed by: NURSE PRACTITIONER

## 2018-05-02 PROCEDURE — 25000003 PHARM REV CODE 250: Performed by: STUDENT IN AN ORGANIZED HEALTH CARE EDUCATION/TRAINING PROGRAM

## 2018-05-02 PROCEDURE — 94761 N-INVAS EAR/PLS OXIMETRY MLT: CPT

## 2018-05-02 PROCEDURE — A4216 STERILE WATER/SALINE, 10 ML: HCPCS | Performed by: NURSE PRACTITIONER

## 2018-05-02 PROCEDURE — 99233 SBSQ HOSP IP/OBS HIGH 50: CPT | Mod: ,,, | Performed by: PSYCHIATRY & NEUROLOGY

## 2018-05-02 PROCEDURE — 85025 COMPLETE CBC W/AUTO DIFF WBC: CPT

## 2018-05-02 PROCEDURE — 97165 OT EVAL LOW COMPLEX 30 MIN: CPT

## 2018-05-02 PROCEDURE — 63600175 PHARM REV CODE 636 W HCPCS: Performed by: PHYSICIAN ASSISTANT

## 2018-05-02 PROCEDURE — 97161 PT EVAL LOW COMPLEX 20 MIN: CPT

## 2018-05-02 PROCEDURE — S4991 NICOTINE PATCH NONLEGEND: HCPCS | Performed by: STUDENT IN AN ORGANIZED HEALTH CARE EDUCATION/TRAINING PROGRAM

## 2018-05-02 PROCEDURE — 25000003 PHARM REV CODE 250: Performed by: ANESTHESIOLOGY

## 2018-05-02 PROCEDURE — 36415 COLL VENOUS BLD VENIPUNCTURE: CPT

## 2018-05-02 PROCEDURE — 20600001 HC STEP DOWN PRIVATE ROOM

## 2018-05-02 PROCEDURE — 80053 COMPREHEN METABOLIC PANEL: CPT

## 2018-05-02 RX ORDER — METOPROLOL TARTRATE 50 MG/1
50 TABLET ORAL EVERY 8 HOURS
Status: DISCONTINUED | OUTPATIENT
Start: 2018-05-02 | End: 2018-05-03 | Stop reason: HOSPADM

## 2018-05-02 RX ADMIN — PHENYTOIN SODIUM 100 MG: 100 CAPSULE ORAL at 09:05

## 2018-05-02 RX ADMIN — GABAPENTIN 900 MG: 300 CAPSULE ORAL at 05:05

## 2018-05-02 RX ADMIN — SODIUM CHLORIDE TAB 1 GM 3 G: 1 TAB at 02:05

## 2018-05-02 RX ADMIN — ACETAMINOPHEN 650 MG: 325 TABLET ORAL at 11:05

## 2018-05-02 RX ADMIN — LEVOTHYROXINE SODIUM 75 MCG: 75 TABLET ORAL at 05:05

## 2018-05-02 RX ADMIN — GABAPENTIN 900 MG: 300 CAPSULE ORAL at 12:05

## 2018-05-02 RX ADMIN — PHENYTOIN SODIUM 100 MG: 100 CAPSULE ORAL at 08:05

## 2018-05-02 RX ADMIN — ACETAMINOPHEN 650 MG: 325 TABLET ORAL at 07:05

## 2018-05-02 RX ADMIN — OXYCODONE HYDROCHLORIDE 5 MG: 5 TABLET ORAL at 09:05

## 2018-05-02 RX ADMIN — METOPROLOL TARTRATE 50 MG: 50 TABLET ORAL at 02:05

## 2018-05-02 RX ADMIN — HEPARIN SODIUM 5000 UNITS: 5000 INJECTION, SOLUTION INTRAVENOUS; SUBCUTANEOUS at 05:05

## 2018-05-02 RX ADMIN — SODIUM CHLORIDE TAB 1 GM 3 G: 1 TAB at 09:05

## 2018-05-02 RX ADMIN — NICOTINE 1 PATCH: 21 PATCH, EXTENDED RELEASE TRANSDERMAL at 09:05

## 2018-05-02 RX ADMIN — HEPARIN SODIUM 5000 UNITS: 5000 INJECTION, SOLUTION INTRAVENOUS; SUBCUTANEOUS at 09:05

## 2018-05-02 RX ADMIN — METOPROLOL TARTRATE 50 MG: 50 TABLET ORAL at 09:05

## 2018-05-02 RX ADMIN — ACETAMINOPHEN 650 MG: 325 TABLET ORAL at 02:05

## 2018-05-02 RX ADMIN — Medication 3 ML: at 05:05

## 2018-05-02 RX ADMIN — Medication 3 ML: at 02:05

## 2018-05-02 RX ADMIN — OXYCODONE HYDROCHLORIDE 5 MG: 5 TABLET ORAL at 03:05

## 2018-05-02 RX ADMIN — GABAPENTIN 900 MG: 300 CAPSULE ORAL at 11:05

## 2018-05-02 RX ADMIN — METOPROLOL TARTRATE 75 MG: 50 TABLET ORAL at 05:05

## 2018-05-02 RX ADMIN — FLUTICASONE FUROATE AND VILANTEROL TRIFENATATE 1 PUFF: 100; 25 POWDER RESPIRATORY (INHALATION) at 08:05

## 2018-05-02 RX ADMIN — STANDARDIZED SENNA CONCENTRATE AND DOCUSATE SODIUM 1 TABLET: 8.6; 5 TABLET, FILM COATED ORAL at 09:05

## 2018-05-02 RX ADMIN — ACETAMINOPHEN 650 MG: 325 TABLET ORAL at 01:05

## 2018-05-02 RX ADMIN — STANDARDIZED SENNA CONCENTRATE AND DOCUSATE SODIUM 1 TABLET: 8.6; 5 TABLET, FILM COATED ORAL at 08:05

## 2018-05-02 RX ADMIN — PHENYTOIN SODIUM 100 MG: 100 CAPSULE ORAL at 02:05

## 2018-05-02 RX ADMIN — SODIUM CHLORIDE TAB 1 GM 3 G: 1 TAB at 08:05

## 2018-05-02 RX ADMIN — HEPARIN SODIUM 5000 UNITS: 5000 INJECTION, SOLUTION INTRAVENOUS; SUBCUTANEOUS at 02:05

## 2018-05-02 RX ADMIN — Medication 3 ML: at 10:05

## 2018-05-02 RX ADMIN — OXYCODONE HYDROCHLORIDE 5 MG: 5 TABLET ORAL at 10:05

## 2018-05-02 NOTE — SUBJECTIVE & OBJECTIVE
Interval History:  >4 elements OR status of 3 inpatient conditions  No events.  CT head pending. Still  some headache and serum Na 132,     Review of Systems   Constitutional: Negative.    HENT: Negative.    Eyes: Negative.    Respiratory: Negative.    Cardiovascular: Negative.    Endocrine: Negative.    Genitourinary: Negative.    Neurological: Positive for headaches.      2 systems OR Unable to obtain a complete ROS due to level of consciousness.  Objective:     Vitals:  Temp: 97.7 °F (36.5 °C)  Pulse: 64  Rhythm: normal sinus rhythm  BP: 133/82  MAP (mmHg): 103  Resp: (!) 33  SpO2: 95 %  O2 Device (Oxygen Therapy): room air    Temp  Min: 97.7 °F (36.5 °C)  Max: 99 °F (37.2 °C)  Pulse  Min: 64  Max: 80  BP  Min: 99/55  Max: 139/95  MAP (mmHg)  Min: 71  Max: 112  Resp  Min: 17  Max: 49  SpO2  Min: 93 %  Max: 98 %    05/01 0701 - 05/02 0700  In: 600 [P.O.:600]  Out: 3000 [Urine:3000]   Unmeasured Output  Urine Occurrence: 1  Stool Occurrence: 0       Physical Exam  Unable to test gait due to level of consciousness.  General   HEENT: S/P R-crani.  Chest Heart RRR / Lungs Clear to auscultation  Abdomen: Soft nontender + BS  Extremities: OK distal pulses.  Skin: UK  Neurological Exam:  MS; Alert, oriented to P/T/P/R, No language abnormalities. Normal mood.  CN: II-XII UK  Motor: LUE   5/5 / RUE 5 /5  Tone normal bilaterally             LLE  5 /5 /  RLE  5/5  Tone normal bilaterally  Sensory: LT/PP/T/ Vibration UK                 Complex sensory modalities: not tested  DTR:  normal throughout.  Coordination /Fine motor: UK  Gait: Not tested.  Meningeal signs: Absent    Medications:  Continuous Scheduled  fluticasone-vilanterol 1 puff Daily   gabapentin 900 mg Q6H   heparin (porcine) 5,000 Units Q8H   levothyroxine 75 mcg Before breakfast   metoprolol tartrate 75 mg Q8H   nicotine 1 patch Daily   phenytoin 100 mg TID   senna-docusate 8.6-50 mg 1 tablet BID   sodium chloride 0.9% 3 mL Q8H   sodium chloride 3 g TID    PRN  acetaminophen 650 mg Q6H PRN   albuterol-ipratropium 2.5mg-0.5mg/3mL 3 mL Q6H PRN   labetalol 10 mg Q4H PRN   magnesium oxide 800 mg PRN   magnesium oxide 800 mg PRN   ondansetron 8 mg Q8H PRN   oxyCODONE 5 mg Q6H PRN   potassium chloride 10% 40 mEq PRN   potassium chloride 10% 40 mEq PRN   potassium chloride 10% 60 mEq PRN   potassium, sodium phosphates 2 packet PRN   potassium, sodium phosphates 2 packet PRN   potassium, sodium phosphates 2 packet PRN     Today I personally reviewed pertinent medications, lines/drains/airways, imaging, cardiology, lab results, microbiology results, notably:    Diet  Diet Adult Regular (IDDSI Level 7) Fluid - 1500mL  CMP:   Recent Labs  Lab 04/30/18 0416 04/30/18 0815   CALCIUM 8.6*  --    ALBUMIN 2.6*  --    PROT 5.6*  --    *  132* 131*   K 4.5  --    CO2 23  --      --    BUN 5*  --    CREATININE 0.6  --    ALKPHOS 85  --    ALT 7*  --    AST 12  --    BILITOT 0.1  --       BMP:   Recent Labs  Lab 04/30/18 0416 04/30/18  0815   *  132* 131*   K 4.5  --      --    CO2 23  --    BUN 5*  --    CREATININE 0.6  --    CALCIUM 8.6*  --       CBC:   Recent Labs  Lab 04/30/18 0416   WBC 6.11   RBC 3.46*   HGB 10.3*   HCT 31.8*   *   MCV 92   MCH 29.8   MCHC 32.4      Lipid Panel:   Recent Labs  Lab 04/26/18 2037   CHOL 201*   LDLCALC 121.4   HDL 49   TRIG 153*      Coagulation:   Recent Labs  Lab 04/26/18 2037   INR 1.0   APTT 26.1      Platelet Aggregation Study: No results for input(s): PLTAGG, PLTAGINTERP, PLTAGREGLACO, ADPPLTAGGREG in the last 168 hours.  Hgb A1C:   Recent Labs  Lab 04/26/18 2037   HGBA1C 4.5      TSH:   Recent Labs  Lab 04/26/18 2037   TSH 3.584      No results for input(s): PH, PCO2, PO2, HCO3, POCSATURATED, BE in the last 24 hours.

## 2018-05-02 NOTE — PLAN OF CARE
05/02/18 0911   Readmission Questionnaire   At the time of your discharge, did someone talk to you about what your health problems were? Yes   At the time of discharge, did someone talk to you about what to watch out for regarding worsening of your health problem? Yes   At the time of discharge, did someone talk to you about what to do if you experienced worsening of your health problem? Yes   At the time of discharge, did someone talk to you about which medication to take when you left the hospital and which ones to stop taking? Yes   At the time of discharge, did someone talk to you about when and where to follow up with a doctor after you left the hospital? Yes   What do you believe caused you to be sick enough to be re-admitted? (worsening HA, nausea, malaise)   How often do you need to have someone help you when you read instructions, pamphlets, or other written material from your doctor or pharmacy? Rarely   Do you have problems taking your medications as prescribed? No   Do you have any problems affording any of  your prescribed medications? No   Do you have problems obtaining/receiving your medications? No   Does the patient have transportation to healthcare appointments? Yes   Lives With friend(s)   Living Arrangements house   Does the patient have family/friends to help with healtcare needs after discharge? yes   Who are your caregiver(s) and their phone number(s)? (friend Traci Henrik [phone])   Does your caregiver provide all the help you need? I don't know   Are you currently feeling confused? No   Are you currently having problems thinking? No   Are you currently having memory problems? No   Have you felt down, depressed, or hopeless? 0   Have you felt little interest or pleasure in doing things? 0   In the last 7 days, my sleep quality was: ten Johnson RN/BSN/NABEEL  [phone]  [facility]

## 2018-05-02 NOTE — SUBJECTIVE & OBJECTIVE
Interval History: NAEON. Patient reports mild HA controlled with her pain medication. She has been OOB ambulating. She is requesting to go home today.     Medications:  Continuous Infusions:  Scheduled Meds:   fluticasone-vilanterol  1 puff Inhalation Daily    gabapentin  900 mg Oral Q6H    heparin (porcine)  5,000 Units Subcutaneous Q8H    levothyroxine  75 mcg Oral Before breakfast    metoprolol tartrate  75 mg Oral Q8H    nicotine  1 patch Transdermal Daily    phenytoin  100 mg Oral TID    senna-docusate 8.6-50 mg  1 tablet Oral BID    sodium chloride 0.9%  3 mL Intravenous Q8H    sodium chloride  3 g Oral TID     PRN Meds:acetaminophen, albuterol-ipratropium 2.5mg-0.5mg/3mL, labetalol, magnesium oxide, magnesium oxide, ondansetron, oxyCODONE, potassium chloride 10%, potassium chloride 10%, potassium chloride 10%, potassium, sodium phosphates, potassium, sodium phosphates, potassium, sodium phosphates     Review of Systems  Objective:     Weight: 57.6 kg (126 lb 15.8 oz)  Body mass index is 21.13 kg/m².  Vital Signs (Most Recent):  Temp: 98.6 °F (37 °C) (05/02/18 0701)  Pulse: 75 (05/02/18 0900)  Resp: 20 (05/02/18 0900)  BP: 108/70 (05/02/18 0900)  SpO2: 97 % (05/02/18 0900) Vital Signs (24h Range):  Temp:  [97.7 °F (36.5 °C)-99 °F (37.2 °C)] 98.6 °F (37 °C)  Pulse:  [61-80] 75  Resp:  [17-49] 20  SpO2:  [93 %-98 %] 97 %  BP: ()/(55-96) 108/70       Date 05/02/18 0700 - 05/03/18 0659   Shift 9439-1037 7003-0267 4271-4840 24 Hour Total   I  N  T  A  K  E   P.O. 270   270    Shift Total  (mL/kg) 270  (4.7)   270  (4.7)   O  U  T  P  U  T   Shift Total  (mL/kg)       Weight (kg) 57.6 57.6 57.6 57.6           Neurosurgery Physical Exam  General: well developed, well nourished, no distress.   Head: normocephalic, atraumatic  Neurologic: Alert and oriented. Thought content appropriate.  GCS: Motor: 6/Verbal: 5/Eyes: 4 GCS Total: 15  Mental Status: Awake, Alert, Oriented x3  Cranial nerves: face  symmetric, tongue midline, CN II-XII grossly intact.   Eyes: pupils equal, round, reactive to light with accomodation, EOMI.   Sensory: intact to light touch throughout  Motor Strength:Moves all extremities spontaneously with good tone.  Full strength upper and lower extremities. No abnormal movements seen.   DTR's - 2 + and symmetric in UE and LE  Pronator Drift: no drift noted  Finger-to-nose: Intact bilaterally  Silveira: absent  Clonus: absent  Babinski: absent  Pulses: 2+ and symmetric radial and dorsalis pedis. No lower extremity edema    Significant Labs:    Recent Labs  Lab 05/01/18  0234  05/01/18  1730 05/02/18  0113 05/02/18  0547   GLU 96  --   --  107  --    *  < > 134* 133* 134*   K 4.8  --   --  4.7  --    CL 99  --   --  102  --    CO2 26  --   --  23  --    BUN 8  --   --  7  --    CREATININE 0.5  --   --  0.6  --    CALCIUM 9.1  --   --  9.2  --    MG 1.6  --  1.9  --   --    < > = values in this interval not displayed.    Recent Labs  Lab 05/01/18  0106 05/01/18  0234 05/02/18  0113   WBC 7.55 6.93 6.32   HGB 9.9* 10.0* 9.7*   HCT 30.5* 30.5* 29.7*    348 326     No results for input(s): LABPT, INR, APTT in the last 48 hours.  Microbiology Results (last 7 days)     ** No results found for the last 168 hours. **        All pertinent labs from the last 24 hours have been reviewed.    Significant Diagnostics:  None new

## 2018-05-02 NOTE — PROGRESS NOTES
[facility]  Neurocritical Care  Progress Note    Admit Date: 4/26/2018  Service Date: 05/01/2018  Length of Stay:5    Subjective:     Chief Complaint: Chronic subdural hematoma    History of Present Illness: Caridad Ortiz is a  52y/o F w/ hx seizures on dilantin, recent head trauma end of March w/ R subdural, HTN, hypothyroidism  Who presents to [facility] for enlarging SDH. She was sent from neurosurgery clinic for large R SDH. Patient says since discharge she has been having gradually worsening headache w/ nausea and malaise. After clinic visit today had outpatient CT scan that showed SDH had increased from 7mm to 2.1 cm w/ 1cm leftward mass effect and possible signs of subfalcine herniation. She was advised to present emergently to ED for evacuation. She is being admitted to [facility] for a higher level of care.     Hospital Course: 4/26: Admit NCC   4/30: No events. SD drain was D/C today. CT heard in the AM. Still some headache.  5/1: No events.  CT head pending. Still  some headache and serum Na 132,       Interval History:  >4 elements OR status of 3 inpatient conditions  No events.  CT head pending. Still  some headache and serum Na 132,     Review of Systems   Constitutional: Negative.    HENT: Negative.    Eyes: Negative.    Respiratory: Negative.    Cardiovascular: Negative.    Endocrine: Negative.    Genitourinary: Negative.    Neurological: Positive for headaches.      2 systems OR Unable to obtain a complete ROS due to level of consciousness.  Objective:     Vitals:  Temp: 97.7 °F (36.5 °C)  Pulse: 64  Rhythm: normal sinus rhythm  BP: 133/82  MAP (mmHg): 103  Resp: (!) 33  SpO2: 95 %  O2 Device (Oxygen Therapy): room air    Temp  Min: 97.7 °F (36.5 °C)  Max: 99 °F (37.2 °C)  Pulse  Min: 64  Max: 80  BP  Min: 99/55  Max: 139/95  MAP (mmHg)  Min: 71  Max: 112  Resp  Min: 17  Max: 49  SpO2  Min: 93 %  Max: 98 %    05/01 0701 - 05/02 0700  In: 600 [P.O.:600]  Out: 3000 [Urine:3000]   Unmeasured  Output  Urine Occurrence: 1  Stool Occurrence: 0       Physical Exam  Unable to test gait due to level of consciousness.  General   HEENT: S/P R-crani.  Chest Heart RRR / Lungs Clear to auscultation  Abdomen: Soft nontender + BS  Extremities: OK distal pulses.  Skin: UK  Neurological Exam:  MS; Alert, oriented to P/T/P/R, No language abnormalities. Normal mood.  CN: II-XII UK  Motor: LUE   5/5 / RUE 5 /5  Tone normal bilaterally             LLE  5 /5 /  RLE  5/5  Tone normal bilaterally  Sensory: LT/PP/T/ Vibration UK                 Complex sensory modalities: not tested  DTR:  normal throughout.  Coordination /Fine motor: UK  Gait: Not tested.  Meningeal signs: Absent    Medications:  Continuous Scheduled  fluticasone-vilanterol 1 puff Daily   gabapentin 900 mg Q6H   heparin (porcine) 5,000 Units Q8H   levothyroxine 75 mcg Before breakfast   metoprolol tartrate 75 mg Q8H   nicotine 1 patch Daily   phenytoin 100 mg TID   senna-docusate 8.6-50 mg 1 tablet BID   sodium chloride 0.9% 3 mL Q8H   sodium chloride 3 g TID   PRN  acetaminophen 650 mg Q6H PRN   albuterol-ipratropium 2.5mg-0.5mg/3mL 3 mL Q6H PRN   labetalol 10 mg Q4H PRN   magnesium oxide 800 mg PRN   magnesium oxide 800 mg PRN   ondansetron 8 mg Q8H PRN   oxyCODONE 5 mg Q6H PRN   potassium chloride 10% 40 mEq PRN   potassium chloride 10% 40 mEq PRN   potassium chloride 10% 60 mEq PRN   potassium, sodium phosphates 2 packet PRN   potassium, sodium phosphates 2 packet PRN   potassium, sodium phosphates 2 packet PRN     Today I personally reviewed pertinent medications, lines/drains/airways, imaging, cardiology, lab results, microbiology results, notably:    Diet  Diet Adult Regular (IDDSI Level 7) Fluid - 1500mL  CMP:   Recent Labs  Lab 04/30/18  0416 04/30/18  0815   CALCIUM 8.6*  --    ALBUMIN 2.6*  --    PROT 5.6*  --    *  132* 131*   K 4.5  --    CO2 23  --      --    BUN 5*  --    CREATININE 0.6  --    ALKPHOS 85  --    ALT 7*  --    AST 12   --    BILITOT 0.1  --       BMP:   Recent Labs  Lab 04/30/18  0416 04/30/18  0815   *  132* 131*   K 4.5  --      --    CO2 23  --    BUN 5*  --    CREATININE 0.6  --    CALCIUM 8.6*  --       CBC:   Recent Labs  Lab 04/30/18 0416   WBC 6.11   RBC 3.46*   HGB 10.3*   HCT 31.8*   *   MCV 92   MCH 29.8   MCHC 32.4      Lipid Panel:   Recent Labs  Lab 04/26/18 2037   CHOL 201*   LDLCALC 121.4   HDL 49   TRIG 153*      Coagulation:   Recent Labs  Lab 04/26/18 2037   INR 1.0   APTT 26.1      Platelet Aggregation Study: No results for input(s): PLTAGG, PLTAGINTERP, PLTAGREGLACO, ADPPLTAGGREG in the last 168 hours.  Hgb A1C:   Recent Labs  Lab 04/26/18 2037   HGBA1C 4.5      TSH:   Recent Labs  Lab 04/26/18 2037   TSH 3.584      No results for input(s): PH, PCO2, PO2, HCO3, POCSATURATED, BE in the last 24 hours.            Assessment/Plan:     No new Assessment & Plan notes have been filed under this hospital service since the last note was generated.  Service: Neuro Critical Care      Active Problem List:   1.Right  Subacute Large SDH S/p surgical evacuation S/D SD drain placement and removal.  2. Secondary sub falcine and  transtentorial herniation  3. Anxiety / Depression prior psychiatric care.              4. Epilepsy  5. HTN  6. Mild left atrial enlargement.   7. SIADH hyponatremia   8. Hypothyroidism.     Assessment / Plan:     Neuro:  -Neurontin 900mg q 6hrs  -Decadron 5 mg x1 IV  -Podkomca547uj tid.  -Oxycodone PRN  -Tylenol PRN  -PT/OT  -NSU CT head this AM.  Resp:  -RA PRN   -Advir inhaler.   -Dual nebs q 6hrs PRN.  -IS.  CV: TTE: LAE. Keep SBP <=160 mmHg.  -Lopressor 75mg q8hrs  -Orthostatic BP  -Check BP on both arms.  -Check orthostatic BP.  IVF/nutrition/Renal/GI: Serum Na 132  -PO diet with 1.5Lt fluid restriction Gatorade only.  -Serum Na q 6hrs  -Salt tablets 3 gr q 8hrs Increase  -BR  Hem / ID:  -D/C Cefazoline.  Endo: Free T4 OK  -Levothyroxine 75 mcg qd  -follow serum a q  6hrs.  Prophylaxis:  -SC Heparin in 6 hrs from pulling the drain. 5000 U q8hrs.  -SCDs.  Advance Directives and Disposition:    Full Code. Possible transfer today on telemetry after assessing head CT. .           Uninterrupted level 3  Follow-up /Counseling Time (not including procedures):  = 35 min            Activity Orders          Out of bed to chair up to 3x daily starting at 04/27 0800    Toilet out of bed or at bedside commode starting at 04/26 2104    Commode at bedside starting at 04/26 2104        Full Code    Tuan Han MD  Neurocritical Care  [facility]

## 2018-05-02 NOTE — PLAN OF CARE
Problem: Physical Therapy Goal  Goal: Physical Therapy Goal  Outcome: Outcome(s) achieved Date Met: 05/02/18  Pt evaluated and safe to d/c without any acute care skilled PT needs.    Terry Bethea, TIAGO  5/2/2018

## 2018-05-02 NOTE — PT/OT/SLP EVAL
Physical Therapy Evaluation and Discharge Note    Patient Name:  Caridad Ortiz   MRN:  [id number]    Recommendations:     Discharge Recommendations:  home   Discharge Equipment Recommendations: none   Barriers to discharge: None    Assessment:     Caridad Ortiz is a 53 y.o. female admitted with a medical diagnosis of Chronic subdural hematoma.  At this time, patient is functioning at their prior level of function and does not require further acute PT services.     Recent Surgery: Procedure(s) (LRB):  [id number] HOLES AND POSSIBLE CRANIOTOMY  R SIDE (Right) 5 Days Post-Op    Plan:     During this hospitalization, patient does not require further acute PT services.  Please re-consult if situation changes.     Plan of Care Reviewed with: patient    Subjective     Communicated with RN prior to session.  Patient found supine with HOB elevated upon PT entry to room, agreeable to evaluation.      Chief Complaint: HA 6/10  Patient comments/goals: Willing to participate in PT evaluation.  Pain/Comfort:  Pain Rating 1: 6/10  Location - Side 1: Right  Location - Orientation 1: generalized  Location 1: head  Pain Addressed 1: Pre-medicate for activity, Reposition  Pain Rating Post-Intervention 1: 6/10    Patients cultural, spiritual, Bahai conflicts given the current situation: None reported    Living Environment:  Home environment:  SSH, no DWIGHT, bathroom has a tub-shower combo  DME:  None owned or used  PLOF:  Prior to admission pt was independent with amb & mobility requiring no AD and no assist from friends.  Pt wants to return to work as a , but has not for past month since first admission.  Social Hx:  Pt lives with friends and friends' children in home (5 adults & 2 children).    Patient has the following equipment: none.  DME owned (not currently used): none.  Upon discharge, patient will have assistance from friends.    Objective:     Patient found with: blood pressure cuff, pulse ox  (continuous), peripheral IV, telemetry     General Precautions: Standard, aspiration, fall, seizure   Orthopedic Precautions:N/A   Braces: N/A     Exams:  · Cognitive Exam:  Patient is oriented to Person, Place, Time and Situation and follows 100% of multi-step commands   · Fine Motor Coordination:    · -       Intact  · Gross Motor Coordination:  WFL  · Postural Exam:  Patient presented with the following abnormalities:    · -       No postural abnormalities identified  · Sensation:    · -       Intact  light/touch B LE  · Skin Integrity/Edema:      · -       Skin integrity: Visible skin intact and Wound surgical incision on head (R side)  · RLE ROM: WNL  · RLE Strength: WNL  · LLE ROM: WNL  · LLE Strength: WNL    Functional Mobility:  · Bed Mobility:     · Supine to Sit: modified independence    · Transfers:     · Sit to Stand:    · 1 x modified independence with no AD  · No assist required from PT    · Gait:   · 6 steps Supervision using no AD to BS chair  · 300 ft Supervision using no AD  · Pt amb in crooks with portable monitor, RN present to monitor.  · Pt amb with decreased gait speed, no LOB noted  · No assist from PT required.    · Balance:   · Static & Dynamic Sitting Balance EOB Supervision  · 1 LOB during shoulder flexion testing, pt able to self-correct posture.  · Static Standing Supervision, no LOB noted    AM-PAC 6 CLICK MOBILITY  Total Score:22       Therapeutic Activities and Exercises:     PT educated pt on:  - Role of PT & POC to d/c from PT  - Importance of OOB activity & sitting up in chair.  - Safety with transfers & gait    RN staff safe to amb pt using no AD to bathroom & in crooks.    Patient left up in chair with all lines intact, call button in reach and RN notified.    GOALS:    Physical Therapy Goals     Not on file          Multidisciplinary Problems (Resolved)        Problem: Physical Therapy Goal    Goal Priority Disciplines Outcome Goal Variances Interventions   Physical Therapy Goal    (Resolved)     PT/OT, PT Outcome(s) achieved                     History:     Past Medical History:   Diagnosis Date    Anxiety     Depression     History of psychiatric care     Hypertension     Psychiatric exam     Seizures        Past Surgical History:   Procedure Laterality Date    BACK SURGERY      TONSILLECTOMY         Clinical Decision Making:     History  Co-morbidities and personal factors that may impact the plan of care Examination  Body Structures and Functions, activity limitations and participation restrictions that may impact the plan of care Clinical Presentation   Decision Making/ Complexity Score   Co-morbidities:   [] Time since onset of injury / illness / exacerbation  [x] Status of current condition  []Patient's cognitive status and safety concerns    [x] Multiple Medical Problems (see med hx)  Personal Factors:   [] Patient's age  [] Prior Level of function   [] Patient's home situation (environment and family support)  [] Patient's level of motivation  [] Expected progression of patient      HISTORY:(criteria)    [] 90890 - no personal factors/history    [x] 43511 - has 1-2 personal factor/comorbidity     [] 21560 - has >3 personal factor/comorbidity     Body Regions:  [x] Objective examination findings  [x] Head     []  Neck  [x] Trunk   [] Upper Extremity  [x] Lower Extremity    Body Systems:  [x] For communication ability, affect, cognition, language, and learning style: the assessment of the ability to make needs known, consciousness, orientation (person, place, and time), expected emotional /behavioral responses, and learning preferences (eg, learning barriers, education  needs)  [x] For the neuromuscular system: a general assessment of gross coordinated movement (eg, balance, gait, locomotion, transfers, and transitions) and motor function  (motor control and motor learning)  [x] For the musculoskeletal system: the assessment of gross symmetry, gross range of motion, gross  strength, height, and weight  [x] For the integumentary system: the assessment of pliability(texture), presence of scar formation, skin color, and skin integrity  [] For cardiovascular/pulmonary system: the assessment of heart rate, respiratory rate, blood pressure, and edema     Activity limitations:    [] Patient's cognitive status and saf ety concerns          [x] Status of current condition      [] Weight bearing restriction  [] Cardiopulmunary Restriction    Participation Restrictions:   [] Goals and goal agreement with the patient     [] Rehab potential (prognosis) and probable outcome      Examination of Body System: (criteria)    [] 10188 - addressing 1-2 elements    [] 65870 - addressing a total of 3 or more elements     [x] 45920 -  Addressing a total of 4 or more elements         Clinical Presentation: (criteria)  Stable - 04147     On examination of body system using standardized tests and measures patient presents with 4 or more elements from any of the following: body structures and functions, activity limitations, and/or participation restrictions.  Leading to a clinical presentation that is considered stable and/or uncomplicated                              Clinical Decision Making  (Eval Complexity):  Low- 31924     Time Tracking:     PT Received On: 05/02/18  PT Start Time: 0819     PT Stop Time: 0836  PT Total Time (min): 17 min     Billable Minutes: Evaluation 17      TIAGO Toro  05/02/2018

## 2018-05-02 NOTE — PLAN OF CARE
Problem: Occupational Therapy Goal  Goal: Occupational Therapy Goal  Outcome: Outcome(s) achieved Date Met: 05/02/18  Pt with no skilled OT needs identified. Pt safe to be up with staff supervision for ADLs/functional mobility. No DME needs. LUCY Lopez 5/2/2018

## 2018-05-02 NOTE — PT/OT/SLP EVAL
Occupational Therapy   Evaluation and Discharge Note    Name: Caridad Ortiz  MRN: [id number]  Admitting Diagnosis:  Chronic subdural hematoma 5 Days Post-Op [place]oles    Recommendations:     Discharge Recommendations: home  Discharge Equipment Recommendations:  none  Barriers to discharge:  None    History:     Occupational Profile:  Living Environment: Pt lives with friends (5 adults; 2 children) in [facility]. Home has 0 DWIGHT. Tub/shower. (moved in with friends following spouse's death)  Previous level of function: active and indep. Required no DME. No assist with ADLs/self care. Has not worked in ~1 month ([employer]). Drives. Wears glasses.   Roles and Routines: friend, care taker to self, home and community dweller,   Equipment Owned:  none  Assistance upon Discharge: yes, friends    Past Medical History:   Diagnosis Date    Anxiety     Depression     History of psychiatric care     Hypertension     Psychiatric exam     Seizures        Past Surgical History:   Procedure Laterality Date    BACK SURGERY      TONSILLECTOMY         Subjective     Chief Complaint: headache  Patient/Family stated goals: home soon  Communicated with: RN prior to session. Completed with PT/PTS  Pain/Comfort:  · Pain Rating 1: 6/10 (6.5)  · Location 1: head  · Pain Addressed 1: Pre-medicate for activity, Reposition  · Pain Rating Post-Intervention 1: 6/10    Patients cultural, spiritual, Zoroastrianism conflicts given the current situation: none reported    Objective:     Patient found with: blood pressure cuff, pulse ox (continuous), peripheral IV    General Precautions: Standard, fall, aspiration, seizure   Orthopedic Precautions:N/A   Braces: N/A     Occupational Performance:    Bed Mobility:    · Patient completed Rolling/Turning to Left with  modified independence and HOB flat  · Patient completed Supine to Sit with modified independence and with HOB flat    Functional Mobility/Transfers:  · Patient completed Sit <> Stand  Transfer with modified independence  with  no assistive device   · Patient completed Bed <> Chair Transfer using Stand Pivot technique with modified independence with no assistive device  · Patient completed Toilet Transfer Stand Pivot technique with modified independence with  no AD  · Functional Mobility: mod(I) at household and short community distance    Activities of Daily Living:  · Feeding:  independence set up  · Grooming: modified independence standing  · UB Dressing: modified independence and standing to don gown as jacket; no deficits with fine motor tie closure      Cognitive/Visual Perceptual:  Cognitive/Psychosocial Skills:     -       Oriented to: Person, Place, Time and Situation   -       Follows Commands/attention:Follows multistep  commands  -       Communication: clear/fluent  -       Memory: No Deficits noted  -       Safety awareness/insight to disability: intact   -       Mood/Affect/Coping skills/emotional control: Appropriate to situation  Visual/Perceptual:      -Intact    Physical Exam:  Balance:    -       mod(I); 1 LOB with testing L UE in standing- posterior- able to self correct without assistance  Postural examination/scapula alignment:    -       Rounded shoulders  -       Forward head  -       Posterior pelvic tilt  Skin integrity: Dry  Edema:  None noted  Sensation:    -       Intact  Motor Planning:    -       Intact B UE  Dominant hand:    -       R  Upper Extremity Range of Motion:     -       Right Upper Extremity: WFL  -       Left Upper Extremity: WFL  Upper Extremity Strength:    -       Right Upper Extremity: WFL  -       Left Upper Extremity: WFL except 4+/5 shoulder flex   Strength:    -       Right Upper Extremity: WFL  -       Left Upper Extremity: WFL  Fine Motor Coordination:    -       Intact  Left hand, finger to nose, Right hand, finger to nose, Left hand, manipulation of objects and Right hand, manipulation of objects  Gross motor coordination:    "WFL    Patient left up in chair with all lines intact, call button in reach and RN notified    [facility] 6 Click:  [facility] Total Score: 24     Body mass index is 21.13 kg/m².    Treatment & Education:  -Pt alert and agreeable to therapy session; edu/re edu on role in care  -Edu on safety and need for staff assistance with OOB activity at this time- demo understanding  -Completed functional mobility<>bathroom for assess<>hallway for community distances  -Communication board updated; questions/concerns addressed within OT scope of practice   Education:    Assessment:     Caridad Ortiz is a 53 y.o. female with a medical diagnosis of Chronic subdural hematoma. She demo baseline for functional mobility and ADLs on this date. She demo no skilled OT needs at this time. She is safe to be up with nursing staff at this time.    At this time, patient is functioning at their prior level of function and does not require further acute OT services.     Clinical Decision Makin.  OT Low:  "Pt evaluation falls under low complexity for evaluation coding due to performance deficits noted in 1-3 areas as stated above and 0 co-morbities affecting current functional status. Data obtained from problem focused assessments. No modifications or assistance was required for completion of evaluation. Only brief occupational profile and history review completed."     Plan:     During this hospitalization, patient does not require further acute OT services.  Please re-consult if situation changes.    · Plan of Care Reviewed with: patient    This Plan of care has been discussed with the patient     Time Tracking:     OT Date of Treatment: 18  OT Start Time: 820  OT Stop Time: 834  OT Total Time (min): 14 min    Billable Minutes:Evaluation 14    LUCY Lopez  2018    "

## 2018-05-02 NOTE — SUBJECTIVE & OBJECTIVE
Prescriptions Prior to Admission   Medication Sig Dispense Refill Last Dose    gabapentin (NEURONTIN) 800 MG tablet Take 800 mg by mouth 2 (two) times daily.       acetaminophen (TYLENOL) 325 MG tablet Take 2 tablets (650 mg total) by mouth every 6 (six) hours as needed.  0 Taking    amLODIPine (NORVASC) 10 MG tablet Take 10 mg by mouth once daily.   Taking    busPIRone (BUSPAR) 15 MG tablet Take 15 mg by mouth 2 (two) times daily.   Taking    calcium-vitamin D3 500 mg(1,250mg) -200 unit per tablet Take 1 tablet by mouth 2 (two) times daily with meals.   Taking    citalopram (CELEXA) 20 MG tablet Take 1 tablet (20 mg total) by mouth once daily. 30 tablet 1     folic acid (FOLVITE) 1 MG tablet Take 1 tablet (1 mg total) by mouth once daily.  0 Taking    levothyroxine (SYNTHROID) 75 MCG tablet Take 75 mcg by mouth once daily.   Taking    lisinopril 10 MG tablet Take 1 tablet (10 mg total) by mouth once daily. 30 tablet 1 6/27/2015    metoprolol tartrate (LOPRESSOR) 25 MG tablet Take 50 mg by mouth 2 (two) times daily.    Taking    mirtazapine (REMERON) 30 MG tablet Take 1 tablet (30 mg total) by mouth every evening. 30 tablet 1     nicotine (NICODERM CQ) 21 mg/24 hr Place 1 patch onto the skin once daily.  0 Taking    oxyCODONE-acetaminophen (PERCOCET)  mg per tablet Take 1 tablet by mouth every 4 to 6 hours as needed for Pain. 75 tablet 0 Taking    phenytoin (DILANTIN) 100 MG ER capsule Take by mouth 3 (three) times daily.   Taking    thiamine 100 MG tablet Take 1 tablet (100 mg total) by mouth once daily.   Taking    tiZANidine (ZANAFLEX) 4 MG tablet Take 4 mg by mouth 2 (two) times daily.   Taking       Review of patient's allergies indicates:  No Known Allergies    Past Medical History:   Diagnosis Date    Anxiety     Depression     History of psychiatric care     Hypertension     Psychiatric exam     Seizures      Past Surgical History:   Procedure Laterality Date    BACK SURGERY       TONSILLECTOMY       Family History     None        Social History Main Topics    Smoking status: Current Every Day Smoker     Packs/day: 1.00     Years: 10.00    Smokeless tobacco: Not on file    Alcohol use 0.0 oz/week     5 - 10 Cans of beer per week    Drug use: No    Sexual activity: No     Review of Systems    Objective:     Weight: 57.6 kg (126 lb 15.8 oz)  Body mass index is 21.13 kg/m².  Vital Signs (Most Recent):  Temp: 97.7 °F (36.5 °C) (05/02/18 0300)  Pulse: 64 (05/02/18 0600)  Resp: (!) 33 (05/02/18 0600)  BP: 133/82 (05/02/18 0600)  SpO2: 95 % (05/02/18 0600) Vital Signs (24h Range):  Temp:  [97.7 °F (36.5 °C)-99 °F (37.2 °C)] 97.7 °F (36.5 °C)  Pulse:  [64-80] 64  Resp:  [17-49] 33  SpO2:  [93 %-98 %] 95 %  BP: ()/(55-96) 133/82            Closed/Suction Drain 04/27/18 1344 Right;Lateral Scalp Bulb 7 Fr. (Active)   Site Description Unable to view 4/30/2018  3:05 AM   Dressing Type [place] 4/30/2018  3:05 AM   Dressing Status Old drainage 4/30/2018  3:05 AM   Drainage Sanguineous 4/30/2018  3:05 AM   Status Other (Comment) 4/30/2018  3:05 AM   Output (mL) 50 mL 4/30/2018  7:55 AM       Physical Exam:  Nursing note and vitals reviewed.    Constitutional: She appears well-developed and well-nourished.     Eyes: Pupils are equal, round, and reactive to light. EOM are normal.     Cardiovascular: Normal rate.     Abdominal: Soft.     Psych/Behavior: She is alert. She is oriented to person, place, and time. She has a normal mood and affect.     Neurological:   AAOx3, NAD, speech fluent, good comprehension, repetition  PERRL EOMI FS TM  GOODWIN 5/5  SILT  No drift       Significant Labs:    Recent Labs  Lab 05/01/18  0234  05/01/18  1730 05/02/18  0113 05/02/18  0547   GLU 96  --   --  107  --    *  < > 134* 133* 134*   K 4.8  --   --  4.7  --    CL 99  --   --  102  --    CO2 26  --   --  23  --    BUN 8  --   --  7  --    CREATININE 0.5  --   --  0.6  --    CALCIUM 9.1  --   --  9.2   --    MG 1.6  --  1.9  --   --    < > = values in this interval not displayed.    Recent Labs  Lab 05/01/18  0106 05/01/18  0234 05/02/18  0113   WBC 7.55 6.93 6.32   HGB 9.9* 10.0* 9.7*   HCT 30.5* 30.5* 29.7*    348 326     No results for input(s): LABPT, INR, APTT in the last 48 hours.  Microbiology Results (last 7 days)     ** No results found for the last 168 hours. **        All pertinent labs from the last 24 hours have been reviewed.    Significant Diagnostics:  CT: No results found in the last 24 hours.  MRI: No results found in the last 24 hours.

## 2018-05-02 NOTE — ASSESSMENT & PLAN NOTE
53 y.o. year old female with R chronic SDH with mass effect. Now s/p edmond hole evac 4/27.    -- Neuro stable  -- Neurochecks q4h  -- Activity as tolerated.   -- Pain control.   -- Maintain SBP <160.  -- Continue home Dilantin  -- Medical management per NCC.     Dispo: OK to [facility] neurosurgery service

## 2018-05-02 NOTE — NURSING TRANSFER
Nursing Transfer Note      5/2/2018     Transfer To: 728A    Transfer via ambulation    Transfer with cardiac monitoring    Transported by Maureen Marcial,RN    Medicines sent: inhaler    Chart send with patient: Yes    Notified: pt to notify friend    Patient reassessed at: 5/2/18 1650    Upon arrival to floor: patient oriented to room, call bell in reach and bed in lowest position, ABDIFATAH Marin at bedside

## 2018-05-02 NOTE — PROGRESS NOTES
[facility]  Neurosurgery  Progress Note    Subjective:     History of Present Illness: Caridad Ortiz is a 53 y.o. year old female with known seizure disorder,HTN,and R convexity chronic SDH s/p seizure-related fall a few weeks ago. She was discharged 2 weeks ago.She came today for follow up CTH. CT head shows evolving chronic SDH, max thicknesses 2 cm and 8 mm MLS.She was sent to the ED for neurosurgical evaluation. She repots worsening R sided headache since discharge. She denies any AMS, weakness, numbness, speech or visual difficulties or seizure.She denies any antiplatelets/anticoagulants agents    Post-Op Info:  Procedure(s) (LRB):  [id number] HOLES AND POSSIBLE CRANIOTOMY  R SIDE (Right)   5 Days Post-Op     Prescriptions Prior to Admission   Medication Sig Dispense Refill Last Dose    gabapentin (NEURONTIN) 800 MG tablet Take 800 mg by mouth 2 (two) times daily.       acetaminophen (TYLENOL) 325 MG tablet Take 2 tablets (650 mg total) by mouth every 6 (six) hours as needed.  0 Taking    amLODIPine (NORVASC) 10 MG tablet Take 10 mg by mouth once daily.   Taking    busPIRone (BUSPAR) 15 MG tablet Take 15 mg by mouth 2 (two) times daily.   Taking    calcium-vitamin D3 500 mg(1,250mg) -200 unit per tablet Take 1 tablet by mouth 2 (two) times daily with meals.   Taking    citalopram (CELEXA) 20 MG tablet Take 1 tablet (20 mg total) by mouth once daily. 30 tablet 1     folic acid (FOLVITE) 1 MG tablet Take 1 tablet (1 mg total) by mouth once daily.  0 Taking    levothyroxine (SYNTHROID) 75 MCG tablet Take 75 mcg by mouth once daily.   Taking    lisinopril 10 MG tablet Take 1 tablet (10 mg total) by mouth once daily. 30 tablet 1 6/27/2015    metoprolol tartrate (LOPRESSOR) 25 MG tablet Take 50 mg by mouth 2 (two) times daily.    Taking    mirtazapine (REMERON) 30 MG tablet Take 1 tablet (30 mg total) by mouth every evening. 30 tablet 1     nicotine (NICODERM CQ) 21  mg/24 hr Place 1 patch onto the skin once daily.  0 Taking    oxyCODONE-acetaminophen (PERCOCET)  mg per tablet Take 1 tablet by mouth every 4 to 6 hours as needed for Pain. 75 tablet 0 Taking    phenytoin (DILANTIN) 100 MG ER capsule Take by mouth 3 (three) times daily.   Taking    thiamine 100 MG tablet Take 1 tablet (100 mg total) by mouth once daily.   Taking    tiZANidine (ZANAFLEX) 4 MG tablet Take 4 mg by mouth 2 (two) times daily.   Taking       Review of patient's allergies indicates:  No Known Allergies    Past Medical History:   Diagnosis Date    Anxiety     Depression     History of psychiatric care     Hypertension     Psychiatric exam     Seizures      Past Surgical History:   Procedure Laterality Date    BACK SURGERY      TONSILLECTOMY       Family History     None        Social History Main Topics    Smoking status: Current Every Day Smoker     Packs/day: 1.00     Years: 10.00    Smokeless tobacco: Not on file    Alcohol use 0.0 oz/week     5 - 10 Cans of beer per week    Drug use: No    Sexual activity: No     Review of Systems    Objective:     Weight: 57.6 kg (126 lb 15.8 oz)  Body mass index is 21.13 kg/m².  Vital Signs (Most Recent):  Temp: 97.7 °F (36.5 °C) (05/02/18 0300)  Pulse: 64 (05/02/18 0600)  Resp: (!) 33 (05/02/18 0600)  BP: 133/82 (05/02/18 0600)  SpO2: 95 % (05/02/18 0600) Vital Signs (24h Range):  Temp:  [97.7 °F (36.5 °C)-99 °F (37.2 °C)] 97.7 °F (36.5 °C)  Pulse:  [64-80] 64  Resp:  [17-49] 33  SpO2:  [93 %-98 %] 95 %  BP: ()/(55-96) 133/82            Closed/Suction Drain 04/27/18 1344 Right;Lateral Scalp Bulb 7 Fr. (Active)   Site Description Unable to view 4/30/2018  3:05 AM   Dressing Type [place] 4/30/2018  3:05 AM   Dressing Status Old drainage 4/30/2018  3:05 AM   Drainage Sanguineous 4/30/2018  3:05 AM   Status Other (Comment) 4/30/2018  3:05 AM   Output (mL) 50 mL 4/30/2018  7:55 AM       Physical Exam:  Nursing note and vitals  reviewed.    Constitutional: She appears well-developed and well-nourished.     Eyes: Pupils are equal, round, and reactive to light. EOM are normal.     Cardiovascular: Normal rate.     Abdominal: Soft.     Psych/Behavior: She is alert. She is oriented to person, place, and time. She has a normal mood and affect.     Neurological:   AAOx3, NAD, speech fluent, good comprehension, repetition  PERRL EOMI FS TM  GOODWIN 5/5  SILT  No drift       Significant Labs:    Recent Labs  Lab 05/01/18  0234  05/01/18  1730 05/02/18  0113 05/02/18  0547   GLU 96  --   --  107  --    *  < > 134* 133* 134*   K 4.8  --   --  4.7  --    CL 99  --   --  102  --    CO2 26  --   --  23  --    BUN 8  --   --  7  --    CREATININE 0.5  --   --  0.6  --    CALCIUM 9.1  --   --  9.2  --    MG 1.6  --  1.9  --   --    < > = values in this interval not displayed.    Recent Labs  Lab 05/01/18  0106 05/01/18  0234 05/02/18  0113   WBC 7.55 6.93 6.32   HGB 9.9* 10.0* 9.7*   HCT 30.5* 30.5* 29.7*    348 326     No results for input(s): LABPT, INR, APTT in the last 48 hours.  Microbiology Results (last 7 days)     ** No results found for the last 168 hours. **        All pertinent labs from the last 24 hours have been reviewed.    Significant Diagnostics:  CT: No results found in the last 24 hours.  MRI: No results found in the last 24 hours.    Assessment/Plan:     * Chronic subdural hematoma     53 y.o. year old female with R chronic SDH with mass effect. Now s/p edmond hole evac 4/27.    -- Neuro stable  -- Neurochecks q4h  -- Activity as tolerated.   -- Pain control.   -- Maintain SBP <160.  -- Continue home Dilantin  -- Medical management per NCC.     Dispo: OK to [facility] neurosurgery service        Seizure after head injury    Dilantin home dose.            Alen Jain MD  Neurosurgery  [facility]

## 2018-05-02 NOTE — PLAN OF CARE
Problem: Patient Care Overview  Goal: Plan of Care Review  Outcome: Ongoing (interventions implemented as appropriate)  POC reviewed with pt at 0500. Pt verbalized understanding. Questions and concerns addressed. Pt voids spontaneously per bedpan. No acute events overnight. Pt progressing toward goals. Will continue to monitor. See flowsheets for full assessment and VS info

## 2018-05-03 ENCOUNTER — TELEPHONE (OUTPATIENT)
Dept: NEUROSURGERY | Facility: CLINIC | Age: 53
End: 2018-05-03

## 2018-05-03 VITALS
OXYGEN SATURATION: 95 % | HEART RATE: 76 BPM | BODY MASS INDEX: 21.16 KG/M2 | WEIGHT: 127 LBS | HEIGHT: 65 IN | DIASTOLIC BLOOD PRESSURE: 80 MMHG | RESPIRATION RATE: 16 BRPM | TEMPERATURE: 98 F | SYSTOLIC BLOOD PRESSURE: 114 MMHG

## 2018-05-03 DIAGNOSIS — I62.00 SUBDURAL HEMORRHAGE: ICD-10-CM

## 2018-05-03 DIAGNOSIS — Z98.890 POST-OPERATIVE STATE: Primary | ICD-10-CM

## 2018-05-03 LAB
ALBUMIN SERPL BCP-MCNC: 2.9 G/DL
ALP SERPL-CCNC: 86 U/L
ALT SERPL W/O P-5'-P-CCNC: 8 U/L
ANION GAP SERPL CALC-SCNC: 9 MMOL/L
AST SERPL-CCNC: 11 U/L
BASOPHILS # BLD AUTO: 0.07 K/UL
BASOPHILS NFR BLD: 1.4 %
BILIRUB SERPL-MCNC: 0.1 MG/DL
BUN SERPL-MCNC: 8 MG/DL
CALCIUM SERPL-MCNC: 9.9 MG/DL
CHLORIDE SERPL-SCNC: 101 MMOL/L
CO2 SERPL-SCNC: 24 MMOL/L
CREAT SERPL-MCNC: 0.6 MG/DL
DIFFERENTIAL METHOD: ABNORMAL
EOSINOPHIL # BLD AUTO: 0.3 K/UL
EOSINOPHIL NFR BLD: 5.4 %
ERYTHROCYTE [DISTWIDTH] IN BLOOD BY AUTOMATED COUNT: 15.8 %
EST. GFR  (AFRICAN AMERICAN): >60 ML/MIN/1.73 M^2
EST. GFR  (NON AFRICAN AMERICAN): >60 ML/MIN/1.73 M^2
GLUCOSE SERPL-MCNC: 99 MG/DL
HCT VFR BLD AUTO: 30.7 %
HGB BLD-MCNC: 10.1 G/DL
IMM GRANULOCYTES # BLD AUTO: 0.03 K/UL
IMM GRANULOCYTES NFR BLD AUTO: 0.6 %
LYMPHOCYTES # BLD AUTO: 2.4 K/UL
LYMPHOCYTES NFR BLD: 48.6 %
MCH RBC QN AUTO: 30.1 PG
MCHC RBC AUTO-ENTMCNC: 32.9 G/DL
MCV RBC AUTO: 92 FL
MONOCYTES # BLD AUTO: 0.5 K/UL
MONOCYTES NFR BLD: 9.5 %
NEUTROPHILS # BLD AUTO: 1.7 K/UL
NEUTROPHILS NFR BLD: 34.5 %
NRBC BLD-RTO: 0 /100 WBC
PLATELET # BLD AUTO: 330 K/UL
PMV BLD AUTO: 9.8 FL
POTASSIUM SERPL-SCNC: 4.4 MMOL/L
PROT SERPL-MCNC: 6 G/DL
RBC # BLD AUTO: 3.35 M/UL
SODIUM SERPL-SCNC: 134 MMOL/L
SODIUM SERPL-SCNC: 135 MMOL/L
WBC # BLD AUTO: 4.84 K/UL

## 2018-05-03 PROCEDURE — 99024 POSTOP FOLLOW-UP VISIT: CPT | Mod: ,,, | Performed by: PHYSICIAN ASSISTANT

## 2018-05-03 PROCEDURE — 36415 COLL VENOUS BLD VENIPUNCTURE: CPT

## 2018-05-03 PROCEDURE — 63600175 PHARM REV CODE 636 W HCPCS: Performed by: PHYSICIAN ASSISTANT

## 2018-05-03 PROCEDURE — 84295 ASSAY OF SERUM SODIUM: CPT

## 2018-05-03 PROCEDURE — 25000003 PHARM REV CODE 250: Performed by: NURSE PRACTITIONER

## 2018-05-03 PROCEDURE — 25000003 PHARM REV CODE 250: Performed by: STUDENT IN AN ORGANIZED HEALTH CARE EDUCATION/TRAINING PROGRAM

## 2018-05-03 PROCEDURE — 85025 COMPLETE CBC W/AUTO DIFF WBC: CPT

## 2018-05-03 PROCEDURE — 80053 COMPREHEN METABOLIC PANEL: CPT

## 2018-05-03 PROCEDURE — S4991 NICOTINE PATCH NONLEGEND: HCPCS | Performed by: STUDENT IN AN ORGANIZED HEALTH CARE EDUCATION/TRAINING PROGRAM

## 2018-05-03 PROCEDURE — A4216 STERILE WATER/SALINE, 10 ML: HCPCS | Performed by: NURSE PRACTITIONER

## 2018-05-03 RX ORDER — GABAPENTIN 800 MG/1
800 TABLET ORAL 2 TIMES DAILY
Qty: 60 TABLET | Refills: 0 | Status: SHIPPED | OUTPATIENT
Start: 2018-05-03

## 2018-05-03 RX ORDER — OXYCODONE HYDROCHLORIDE 5 MG/1
5 TABLET ORAL EVERY 6 HOURS PRN
Qty: 60 TABLET | Refills: 0 | Status: SHIPPED | OUTPATIENT
Start: 2018-05-03 | End: 2018-05-10 | Stop reason: SDUPTHER

## 2018-05-03 RX ADMIN — ACETAMINOPHEN 650 MG: 325 TABLET ORAL at 05:05

## 2018-05-03 RX ADMIN — OXYCODONE HYDROCHLORIDE 5 MG: 5 TABLET ORAL at 04:05

## 2018-05-03 RX ADMIN — HEPARIN SODIUM 5000 UNITS: 5000 INJECTION, SOLUTION INTRAVENOUS; SUBCUTANEOUS at 05:05

## 2018-05-03 RX ADMIN — NICOTINE 1 PATCH: 21 PATCH, EXTENDED RELEASE TRANSDERMAL at 08:05

## 2018-05-03 RX ADMIN — PHENYTOIN SODIUM 100 MG: 100 CAPSULE ORAL at 08:05

## 2018-05-03 RX ADMIN — GABAPENTIN 900 MG: 300 CAPSULE ORAL at 11:05

## 2018-05-03 RX ADMIN — STANDARDIZED SENNA CONCENTRATE AND DOCUSATE SODIUM 1 TABLET: 8.6; 5 TABLET, FILM COATED ORAL at 08:05

## 2018-05-03 RX ADMIN — OXYCODONE HYDROCHLORIDE 5 MG: 5 TABLET ORAL at 10:05

## 2018-05-03 RX ADMIN — GABAPENTIN 900 MG: 300 CAPSULE ORAL at 05:05

## 2018-05-03 RX ADMIN — SODIUM CHLORIDE TAB 1 GM 3 G: 1 TAB at 08:05

## 2018-05-03 RX ADMIN — ACETAMINOPHEN 650 MG: 325 TABLET ORAL at 11:05

## 2018-05-03 RX ADMIN — FLUTICASONE FUROATE AND VILANTEROL TRIFENATATE 1 PUFF: 100; 25 POWDER RESPIRATORY (INHALATION) at 08:05

## 2018-05-03 RX ADMIN — LEVOTHYROXINE SODIUM 75 MCG: 75 TABLET ORAL at 05:05

## 2018-05-03 RX ADMIN — Medication 3 ML: at 05:05

## 2018-05-03 NOTE — ASSESSMENT & PLAN NOTE
53 y.o. year old female with R chronic SDH with mass effect. Now s/p edmond hole evac 4/27.    -- Neuro stable post-operatively   -- CTH stable   -- Neurochecks q4h  --Bacitracin to incision   -- PT/OT/OOB  -- Continue current pain control   -- Maintain SBP <160.  -- Continue home Dilantin  -- Therapy recommending home today. Plan for DC home with 2 week follow up for wound check and 4 weeks with CTH. Appointments will be mailed to the patient   --Discussed with Dr. Morataya.

## 2018-05-03 NOTE — OP NOTE
DATE OF PROCEDURE:  04/27/2018    ATTENDING PHYSICIAN:  Negro Morataya M.D.    PREOPERATIVE DIAGNOSIS:  Subacute subdural hematoma.    POSTOPERATIVE DIAGNOSIS:  Subacute subdural hematoma.    PROCEDURE:  Louann hole evacuation of subdural hematoma.    SURGEON:  Negro Morataya M.D.    ASSISTANT:  Augustus Jain M.D. (RES) ([facility] Resident Neurosurgery).    ANESTHESIA:  General endotracheal.    ESTIMATED BLOOD LOSS:  100 mL    DRAINS:  Gwyn-Garzon subdural.    CONDITION AT END OF PROCEDURE:  Satisfactory.    BRIEF HISTORY:  This 53-year-old lady presented after a fall on 03/31/2018 and   was found to have an acute right subdural hematoma.  This was of moderate size,   but not symptomatic.  She was followed for the next week with repeat scans and   the clot remained stable, perhaps even a little improved and she was discharged   to return to clinic.  However, when she returned, it was seen that now she had a   large subacute to chronic appearing subdural hematoma with considerable shift.    She still remains neurologically intact and is brought to the Operating Room   for removal of the blood clot.    PROCEDURE IN DETAIL:  In supine position under premedication, the patient was   intubated and induced with general anesthesia.  A cannula was placed in the left   radial artery for continuous blood pressure monitoring.  A Kennedy catheter   inserted, sequential compression devices applied to legs and various intravenous   lines started.  The head was turned to the left and immobilized with the   [place] 3-point skeletal fixation device and the table rolled somewhat toward   Anesthesia to bring the parietal area more horizontal to the floor.  A strip of   hair along the temporal line was shaved and this portion of the scalp prepped   with Betadine and draped in a sterile fashion.  Two small coronal incisions, one   just behind the hairline and one posterior were outlined and these were   injected with 1% Xylocaine and  epinephrine.  At each location, an incision was   opened with a scalpel down through the skin, galea and pericranium.  The   pericranium scraped from the skull and the skin retracted with small Gelpi   retractors.  Edmond holes were made with the  and widened with the M8   attachment.  Clean towels were placed around the edmond hole openings.  The dura   was coagulated and opened with a #15 blade.  There already was some membrane   forming and this was coagulated.  Motor oil type fluid came out under some   pressure.  With both edmond holes opened, copious irrigation was carried out until   the fluid was clear.  A Gywn-Garzon drain was then passed in the posterior   hole past the anterior hole to the frontal area and brought through a separate   stab incision posterior to the posterior incision.  After additional irrigation   through the drain, Gelfoam was placed in the posterior hole and the anterior   hole and a [place] edmond hole cover placed over the anterior hole for cosmetic   purposes.  The galea was then closed with inverted interrupted 3-0 Vicryl   sutures and the skin closed with skin staples and a Gwyn-Garzon drain attached   to the grenade, which comes with that kit.  The patient was then removed   3-point skeletal fixation device, returned to full supine position, allowed to   awaken from anesthesia, extubated and left the Operating Room in satisfactory   condition.  She received 2 g of Rocephin and 500 mg of Dilantin at the beginning   of the procedure and bacitracin-containing antibiotic irrigating solutions were   used throughout.      RDS/HN  dd: 05/03/2018 09:37:41 (CDT)  td: 05/03/2018 11:38:41 (CDT)  Doc ID   #[id number]  Job ID #[id number]    CC:

## 2018-05-03 NOTE — PROGRESS NOTES
[facility]  Neurocritical Care  Progress Note    Admit Date: 4/26/2018  Service Date: 05/02/2018  Length of Stay: 6    Subjective:     Chief Complaint: Chronic subdural hematoma    History of Present Illness: Craidad Ortiz is a  54y/o F w/ hx seizures on dilantin, recent head trauma end of March w/ R subdural, HTN, hypothyroidism  Who presents to [facility] for enlarging SDH. She was sent from neurosurgery clinic for large R SDH. Patient says since discharge she has been having gradually worsening headache w/ nausea and malaise. After clinic visit today had outpatient CT scan that showed SDH had increased from 7mm to 2.1 cm w/ 1cm leftward mass effect and possible signs of subfalcine herniation. She was advised to present emergently to ED for evacuation. She is being admitted to [facility] for a higher level of care.     Hospital Course: 4/26: Admit NCC   4/30: No events. SD drain was D/C today. CT heard in the AM. Still some headache.  5/1: No events.  CT head pending. Still  some headache and serum Na 132  5/2: No events.  CT head mild residual R-SDH and pneumocephalus. BP on the lower side. Serum Na 134-133       Interval History:  >4 elements OR status of 3 inpatient conditions  No events.  CT head mild residual R-SDH and pneumocephalus. BP on the lower side. Serum Na 134-133  Review of Systems  2 systems OR Unable to obtain a complete ROS due to level of consciousness.  Objective:     Vitals:  Temp: 99.1 °F (37.3 °C)  Pulse: 65  Rhythm: normal sinus rhythm  BP: 134/89  MAP (mmHg): 107  Resp: 16  SpO2: 96 %  O2 Device (Oxygen Therapy): room air    Temp  Min: 97.7 °F (36.5 °C)  Max: 99.1 °F (37.3 °C)  Pulse  Min: 61  Max: 95  BP  Min: 97/69  Max: 134/89  MAP (mmHg)  Min: 76  Max: 107  Resp  Min: 16  Max: 33  SpO2  Min: 94 %  Max: 99 %    05/01 0701 - 05/02 0700  In: 600 [P.O.:600]  Out: 3000 [Urine:3000]   Unmeasured Output  Urine Occurrence: 3  Stool Occurrence: 0       Physical Exam  Unable to test gait  due to level of consciousness.  General   HEENT: S/P R-crani.  Chest Heart RRR / Lungs Clear to auscultation  Abdomen: Soft nontender + BS  Extremities: OK distal pulses.  Skin: UK  Neurological Exam:  MS; Alert, oriented to P/T/P/R, No language abnormalities. Normal mood.  CN: II-XII UK  Motor: LUE   5/5 / RUE 5 /5  Tone normal bilaterally             LLE  5 /5 /  RLE  5/5  Tone normal bilaterally  Sensory: LT/PP/T/ Vibration UK                 Complex sensory modalities: not tested  DTR:  normal throughout.  Coordination /Fine motor:   Gait: Not tested.    Medications:  Continuous Scheduled  fluticasone-vilanterol 1 puff Daily   gabapentin 900 mg Q6H   heparin (porcine) 5,000 Units Q8H   levothyroxine 75 mcg Before breakfast   metoprolol tartrate 50 mg Q8H   nicotine 1 patch Daily   phenytoin 100 mg TID   senna-docusate 8.6-50 mg 1 tablet BID   sodium chloride 0.9% 3 mL Q8H   sodium chloride 3 g TID   PRN  acetaminophen 650 mg Q6H PRN   albuterol-ipratropium 2.5mg-0.5mg/3mL 3 mL Q6H PRN   labetalol 10 mg Q4H PRN   magnesium oxide 800 mg PRN   magnesium oxide 800 mg PRN   ondansetron 8 mg Q8H PRN   oxyCODONE 5 mg Q6H PRN   potassium chloride 10% 40 mEq PRN   potassium chloride 10% 40 mEq PRN   potassium chloride 10% 60 mEq PRN   potassium, sodium phosphates 2 packet PRN   potassium, sodium phosphates 2 packet PRN   potassium, sodium phosphates 2 packet PRN     Today I personally reviewed pertinent medications, lines/drains/airways, imaging, cardiology, lab results, microbiology results, notably:    Diet  Diet Adult Regular (IDDSI Level 7) Fluid - 1500mL    CMP:   Recent Labs  Lab 04/30/18 0416 04/30/18  0815   CALCIUM 8.6*  --    ALBUMIN 2.6*  --    PROT 5.6*  --    *  132* 131*   K 4.5  --    CO2 23  --      --    BUN 5*  --    CREATININE 0.6  --    ALKPHOS 85  --    ALT 7*  --    AST 12  --    BILITOT 0.1  --       BMP:   Recent Labs  Lab 04/30/18  0416 04/30/18  0815   *  132* 131*   K 4.5   --      --    CO2 23  --    BUN 5*  --    CREATININE 0.6  --    CALCIUM 8.6*  --       CBC:   Recent Labs  Lab 04/30/18  0416   WBC 6.11   RBC 3.46*   HGB 10.3*   HCT 31.8*   *   MCV 92   MCH 29.8   MCHC 32.4      Lipid Panel:   Recent Labs  Lab 04/26/18 2037   CHOL 201*   LDLCALC 121.4   HDL 49   TRIG 153*      Coagulation:   Recent Labs  Lab 04/26/18 2037   INR 1.0   APTT 26.1      Platelet Aggregation Study: No results for input(s): PLTAGG, PLTAGINTERP, PLTAGREGLACO, ADPPLTAGGREG in the last 168 hours.  Hgb A1C:   Recent Labs  Lab 04/26/18 2037   HGBA1C 4.5      TSH:   Recent Labs  Lab 04/26/18 2037   TSH 3.584      No results for input(s): PH, PCO2, PO2, HCO3, POCSATURATED, BE in the last 24 hours.              Assessment/Plan:     No new Assessment & Plan notes have been filed under this hospital service since the last note was generated.  Service: Neuro Critical Care      Active Problem List:   1.Right  Subacute Large SDH S/p surgical evacuation S/D SD drain placement and removal.  2. Secondary sub falcine and  transtentorial herniation  3. Anxiety / Depression prior psychiatric care.              4. Epilepsy  5. HTN  6. Mild left atrial enlargement.   7. SIADH hyponatremia   8. Hypothyroidism.    Assessment / Plan:     Neuro:  -Neurontin 900mg q 6hrs  -[id number] tid.  -Oxycodone PRN  -Tylenol PRN  -PT/OT.  Resp:  -RA    -Advir inhaler.   -Dual nebs q 6hrs PRN.  -IS.  CV: TTE: LAE. Keep SBP <=160 mmHg.  -Lopressor 50 mg q8hrs. Decrease  -Orthostatic BP  -Check BP on both arms.  -Check orthostatic BP.  IVF/nutrition/Renal/GI: Serum Na 132  -PO diet with 1.5Lt fluid restriction Gatorade only.  -Serum Na q 6hrs  -Salt tablets 3 gr q 8hrs Increase  -BR  Hem / ID:  -D/C Cefazoline.  Endo: Free T4 OK  -Levothyroxine 75 mcg qd  -Follow serum a q 6hrs.  Prophylaxis:  -SC Heparin in 6 hrs from pulling the drain. 5000 U q8hrs.  -SCDs.  Advance Directives and Disposition:    Full Code. Transfer  today to NSU floor           Uninterrupted level 3  Follow-up /Counseling Time (not including procedures):  = 35 min            Activity Orders          Out of bed to chair up to 3x daily starting at 04/27 0800    Toilet out of bed or at bedside commode starting at 04/26 2104    Commode at bedside starting at 04/26 2104        Full Code    Tuan Han MD  Neurocritical Care  [facility]

## 2018-05-03 NOTE — NURSING
Call placed to on call for neurosurgery, Zaid Dodd. MD notified that pt BP is 106/66 and she has Lopressor 50mg scheduled this am. Physician ordered to hold the dose.

## 2018-05-03 NOTE — NURSING
Discharge instructions and prescriptions given to patient at this time. Verbally explained discharge packet to patient and no further questions at this time. Tele and IV removed at this time.

## 2018-05-03 NOTE — PLAN OF CARE
Patient to be discharged home. The patient does not have any home needs. Family to provide transportation home. Neurosurgery clinic to schedule follow up appointment.    Future Appointments  Date Time Provider Department Center   5/10/2018 2:30 PM Negro Morataya MD [facility] NEUROS7 [facility]        05/03/18 1300   Final Note   Assessment Type Final Discharge Note   Discharge Disposition Home   Hospital Follow Up  Appt(s) scheduled? (Neurosurgery clinic to schedule follow up appointment.)   Discharge plans and expectations educations in teach back method with documentation complete? Yes

## 2018-05-03 NOTE — SUBJECTIVE & OBJECTIVE
Interval History:  >4 elements OR status of 3 inpatient conditions  No events.  CT head mild residual R-SDH and pneumocephalus. BP on the lower side. Serum Na 134-133  Review of Systems  2 systems OR Unable to obtain a complete ROS due to level of consciousness.  Objective:     Vitals:  Temp: 99.1 °F (37.3 °C)  Pulse: 65  Rhythm: normal sinus rhythm  BP: 134/89  MAP (mmHg): 107  Resp: 16  SpO2: 96 %  O2 Device (Oxygen Therapy): room air    Temp  Min: 97.7 °F (36.5 °C)  Max: 99.1 °F (37.3 °C)  Pulse  Min: 61  Max: 95  BP  Min: 97/69  Max: 134/89  MAP (mmHg)  Min: 76  Max: 107  Resp  Min: 16  Max: 33  SpO2  Min: 94 %  Max: 99 %    05/01 0701 - 05/02 0700  In: 600 [P.O.:600]  Out: 3000 [Urine:3000]   Unmeasured Output  Urine Occurrence: 3  Stool Occurrence: 0       Physical Exam  Unable to test gait due to level of consciousness.  General   HEENT: S/P R-crani.  Chest Heart RRR / Lungs Clear to auscultation  Abdomen: Soft nontender + BS  Extremities: OK distal pulses.  Skin: UK  Neurological Exam:  MS; Alert, oriented to P/T/P/R, No language abnormalities. Normal mood.  CN: II-XII   Motor: LUE   5/5 / RUE 5 /5  Tone normal bilaterally             LLE  5 /5 /  RLE  5/5  Tone normal bilaterally  Sensory: LT/PP/T/ Vibration                  Complex sensory modalities: not tested  DTR:  normal throughout.  Coordination /Fine motor:   Gait: Not tested.    Medications:  Continuous Scheduled  fluticasone-vilanterol 1 puff Daily   gabapentin 900 mg Q6H   heparin (porcine) 5,000 Units Q8H   levothyroxine 75 mcg Before breakfast   metoprolol tartrate 50 mg Q8H   nicotine 1 patch Daily   phenytoin 100 mg TID   senna-docusate 8.6-50 mg 1 tablet BID   sodium chloride 0.9% 3 mL Q8H   sodium chloride 3 g TID   PRN  acetaminophen 650 mg Q6H PRN   albuterol-ipratropium 2.5mg-0.5mg/3mL 3 mL Q6H PRN   labetalol 10 mg Q4H PRN   magnesium oxide 800 mg PRN   magnesium oxide 800 mg PRN   ondansetron 8 mg Q8H PRN   oxyCODONE 5 mg Q6H PRN    potassium chloride 10% 40 mEq PRN   potassium chloride 10% 40 mEq PRN   potassium chloride 10% 60 mEq PRN   potassium, sodium phosphates 2 packet PRN   potassium, sodium phosphates 2 packet PRN   potassium, sodium phosphates 2 packet PRN     Today I personally reviewed pertinent medications, lines/drains/airways, imaging, cardiology, lab results, microbiology results, notably:    Diet  Diet Adult Regular (IDDSI Level 7) Fluid - 1500mL    CMP:   Recent Labs  Lab 04/30/18 0416 04/30/18 0815   CALCIUM 8.6*  --    ALBUMIN 2.6*  --    PROT 5.6*  --    *  132* 131*   K 4.5  --    CO2 23  --      --    BUN 5*  --    CREATININE 0.6  --    ALKPHOS 85  --    ALT 7*  --    AST 12  --    BILITOT 0.1  --       BMP:   Recent Labs  Lab 04/30/18 0416 04/30/18 0815   *  132* 131*   K 4.5  --      --    CO2 23  --    BUN 5*  --    CREATININE 0.6  --    CALCIUM 8.6*  --       CBC:   Recent Labs  Lab 04/30/18 0416   WBC 6.11   RBC 3.46*   HGB 10.3*   HCT 31.8*   *   MCV 92   MCH 29.8   MCHC 32.4      Lipid Panel:   Recent Labs  Lab 04/26/18 2037   CHOL 201*   LDLCALC 121.4   HDL 49   TRIG 153*      Coagulation:   Recent Labs  Lab 04/26/18 2037   INR 1.0   APTT 26.1      Platelet Aggregation Study: No results for input(s): PLTAGG, PLTAGINTERP, PLTAGREGLACO, ADPPLTAGGREG in the last 168 hours.  Hgb A1C:   Recent Labs  Lab 04/26/18 2037   HGBA1C 4.5      TSH:   Recent Labs  Lab 04/26/18 2037   TSH 3.584      No results for input(s): PH, PCO2, PO2, HCO3, POCSATURATED, BE in the last 24 hours.

## 2018-05-03 NOTE — PLAN OF CARE
Problem: Patient Care Overview  Goal: Plan of Care Review  Outcome: Ongoing (interventions implemented as appropriate)  Plan of care reviewed with pt. Pt verbalized understanding. Pt AAOx4. Pt complaining of a headache. Medications administered. Fall precautions maintained. Bed in lowest position, and locked with bed alarm on. Call light within reach and advised to call for assistance. Side rails x 2 and slip resistant socks on at this time.

## 2018-05-03 NOTE — PROGRESS NOTES
[facility]  Neurosurgery  Progress Note    Subjective:     History of Present Illness: Caridad Ortiz is a 53 y.o. year old female with known seizure disorder,HTN,and R convexity chronic SDH s/p seizure-related fall a few weeks ago. She was discharged 2 weeks ago.She came today for follow up CTH. CT head shows evolving chronic SDH, max thicknesses 2 cm and 8 mm MLS.She was sent to the ED for neurosurgical evaluation. She repots worsening R sided headache since discharge. She denies any AMS, weakness, numbness, speech or visual difficulties or seizure.She denies any antiplatelets/anticoagulants agents    Post-Op Info:  Procedure(s) (LRB):  NSZRCMAULU-SCFBCDNS-CFIS HOLES AND POSSIBLE CRANIOTOMY  R SIDE (Right)   6 Days Post-Op     Interval History: NAEON. Patient reports mild HA controlled with her pain medication. She has been OOB ambulating. She is requesting to go home today.     Medications:  Continuous Infusions:  Scheduled Meds:   fluticasone-vilanterol  1 puff Inhalation Daily    gabapentin  900 mg Oral Q6H    heparin (porcine)  5,000 Units Subcutaneous Q8H    levothyroxine  75 mcg Oral Before breakfast    metoprolol tartrate  75 mg Oral Q8H    nicotine  1 patch Transdermal Daily    phenytoin  100 mg Oral TID    senna-docusate 8.6-50 mg  1 tablet Oral BID    sodium chloride 0.9%  3 mL Intravenous Q8H    sodium chloride  3 g Oral TID     PRN Meds:acetaminophen, albuterol-ipratropium 2.5mg-0.5mg/3mL, labetalol, magnesium oxide, magnesium oxide, ondansetron, oxyCODONE, potassium chloride 10%, potassium chloride 10%, potassium chloride 10%, potassium, sodium phosphates, potassium, sodium phosphates, potassium, sodium phosphates     Review of Systems  Objective:     Weight: 57.6 kg (126 lb 15.8 oz)  Body mass index is 21.13 kg/m².  Vital Signs (Most Recent):  Temp: 98.6 °F (37 °C) (05/02/18 0701)  Pulse: 75 (05/02/18 0900)  Resp: 20 (05/02/18 0900)  BP: 108/70 (05/02/18 0900)  SpO2: 97  % (05/02/18 0900) Vital Signs (24h Range):  Temp:  [97.7 °F (36.5 °C)-99 °F (37.2 °C)] 98.6 °F (37 °C)  Pulse:  [61-80] 75  Resp:  [17-49] 20  SpO2:  [93 %-98 %] 97 %  BP: ()/(55-96) 108/70       Date 05/02/18 0700 - 05/03/18 0659   Shift 0482-4722 9961-2963 8209-6362 24 Hour Total   I  N  T  A  K  E   P.O. 270   270    Shift Total  (mL/kg) 270  (4.7)   270  (4.7)   O  U  T  P  U  T   Shift Total  (mL/kg)       Weight (kg) 57.6 57.6 57.6 57.6           Neurosurgery Physical Exam  General: well developed, well nourished, no distress.   Head: normocephalic, atraumatic  Neurologic: Alert and oriented. Thought content appropriate.  GCS: Motor: 6/Verbal: 5/Eyes: 4 GCS Total: 15  Mental Status: Awake, Alert, Oriented x3  Cranial nerves: face symmetric, tongue midline, CN II-XII grossly intact.   Eyes: pupils equal, round, reactive to light with accomodation, EOMI.   Sensory: intact to light touch throughout  Motor Strength:Moves all extremities spontaneously with good tone.  Full strength upper and lower extremities. No abnormal movements seen.   DTR's - 2 + and symmetric in UE and LE  Pronator Drift: no drift noted  Finger-to-nose: Intact bilaterally  Silveira: absent  Clonus: absent  Babinski: absent  Pulses: 2+ and symmetric radial and dorsalis pedis. No lower extremity edema    Significant Labs:    Recent Labs  Lab 05/01/18  0234  05/01/18  1730 05/02/18  0113 05/02/18  0547   GLU 96  --   --  107  --    *  < > 134* 133* 134*   K 4.8  --   --  4.7  --    CL 99  --   --  102  --    CO2 26  --   --  23  --    BUN 8  --   --  7  --    CREATININE 0.5  --   --  0.6  --    CALCIUM 9.1  --   --  9.2  --    MG 1.6  --  1.9  --   --    < > = values in this interval not displayed.    Recent Labs  Lab 05/01/18  0106 05/01/18  0234 05/02/18  0113   WBC 7.55 6.93 6.32   HGB 9.9* 10.0* 9.7*   HCT 30.5* 30.5* 29.7*    348 326     No results for input(s): LABPT, INR, APTT in the last 48 hours.  Microbiology Results  (last 7 days)     ** No results found for the last 168 hours. **        All pertinent labs from the last 24 hours have been reviewed.    Significant Diagnostics:  None new     Assessment/Plan:     * Chronic subdural hematoma     53 y.o. year old female with R chronic SDH with mass effect. Now s/p edmond hole evac 4/27.    -- Neuro stable post-operatively   -- CTH stable   -- Neurochecks q4h  --Bacitracin to incision   -- PT/OT/OOB  -- Continue current pain control   -- Maintain SBP <160.  -- Continue home Dilantin  -- Therapy recommending home today. Plan for DC home with 2 week follow up for wound check and 4 weeks with CTH. Appointments will be mailed to the patient   --Discussed with Dr. Morataya.         Seizure after head injury    Dilantin home dose.            Bonny Jimenez PA-C  Neurosurgery  [facility]

## 2018-05-03 NOTE — DISCHARGE INSTRUCTIONS
Please follow ONLY the instructions that are checked below.    Activity Restrictions:  [x]  Return to work will be determined on an individual basis.  [x]  No lifting greater than 10 pounds.  [x]  No driving or operating machinery:  [x]  until cleared by your surgeon.  [x]  while taking narcotic pain medications or muscle relaxants.  [x]  Increase ambulation over the next 2 weeks so that you are walking 2 miles per day at 2 weeks post-operatively.  [x]  Walk on paved surfaces only. It is okay to walk up and down stairs while holding onto a side rail.  [x]  No sexual activity for 2-3 weeks.    Discharge Medication/Follow-up:  [x]  Please refer to discharge medication reconciliation form.  [x]  Do not take ANY non-steroidal anti-inflammatory drugs (NSAIDS), including the following: ibuprofen, naprosyn, Aleve, Advil, Indocin, Mobic, or Celebrex for:  []  4 weeks  [x]  8 weeks  []  6 months  [x]  Prescriptions for appropriate medication will be given upon discharge.   []  Pain control:             []  Other:             [x]  Take docusate (Colace 100 mg): take one capsule a day as needed for constipation. You can get this over the counter.  [x]  Follow-up appointment:  [x]  10-14 days post-op for wound check by physician assistant/nurse  [x]  4-6 weeks with MD:  [x]  with CT / MRI  []  without CT / MRI  []  An appointment will be mailed to you.    Wound Care:  []  Remove dressing or bandaid in    days.  [x]  No bandage required. Keep your incision open to the air.  [x]  You may shower on the 2nd day after your surgery. Have the force of water hit you opposite from the incision. Pat the incision dry after your shower; do not scrub the incision.  [x]  You cannot take a bath until 8 weeks after surgery.  [x]  Apply bacitracin to incision twice a day for  12  more days.    Call your doctor or go to the Emergency Room for any signs of infection, including: increased redness, drainage, pain, or fever (temperature ?101.5 for  24 hours). Call your doctor or go to the Emergency Room if there are any localized neurological changes; problems with speech, vision, numbness, tingling, weakness, or severe headache; or for other concerns.    Special Instructions:  [x]  No use of tobacco products.  [x]  Diet: Please eat a regular diet as tolerated.  []  Other diet:              Specific physician instructions:           Physicians need 3 days' notice for pain medicine to be refilled. Pain medicine will only be refilled between 8 AM and 5 PM, Monday through Friday, due to Food and Drug Administration regulation of documentation.    If you have any questions about this form, please call [phone].    Form No. 52208 (Revised 10/31/2013)

## 2018-05-04 NOTE — DISCHARGE SUMMARY
[facility]  Neurosurgery  Discharge Summary      Patient Name: Caridad Ortiz  MRN: [id number]  Admission Date: 4/26/2018  Hospital Length of Stay: 7 days   Discharge Date and Time: 5/3/2018  2:00 PM  Attending Physician: No att. providers found   Discharging Provider: Bonny Jimenez PA-C  Primary Care Provider: Kim Anguiano NP    HPI:   Caridad Ortiz is a 53 y.o. year old female with known seizure disorder,HTN,and R convexity chronic SDH s/p seizure-related fall a few weeks ago. She was discharged 2 weeks ago.She came today for follow up CTH. CT head shows evolving chronic SDH, max thicknesses 2 cm and 8 mm MLS.She was sent to the ED for neurosurgical evaluation. She repots worsening R sided headache since discharge. She denies any AMS, weakness, numbness, speech or visual difficulties or seizure.She denies any antiplatelets/anticoagulants agents    Procedure(s) (LRB):  CNMCROLHDE-[id number]-AVZH HOLES AND POSSIBLE CRANIOTOMY  R SIDE (Right)     Hospital Course: 4/27: edmond holes evac SDH  4/28: drain in, stable.  4/29: NAOEN  4/30: drain out  5/1: stable TTF  5/2: TTF  5/3 NAEON. Patient with pain controlled. Has been OOB ambulating. DC home today. Follow up scheduled    Consults:   Consults         Status Ordering Provider     Inpatient consult to Neurosurgery  Once     Provider:  (Not yet assigned)    Completed MARILU BOND     Inpatient consult to Registered Dietitian/Nutritionist  Once     Provider:  (Not yet assigned)    Completed SILVERIO FLORES          Significant Diagnostic Studies: Labs:   BMP:   Recent Labs  Lab 05/02/18  2328 05/03/18  0440 05/03/18  1228   GLU  --  99  --    * 134* 135*   K  --  4.4  --    CL  --  101  --    CO2  --  24  --    BUN  --  8  --    CREATININE  --  0.6  --    CALCIUM  --  9.9  --    , CBC   Recent Labs  Lab 05/03/18  0440   WBC 4.84   HGB 10.1*   HCT 30.7*       and INR   Lab Results   Component Value Date    INR 1.0 04/26/2018     INR 0.9 04/01/2018    INR 1.0 03/31/2018       Pending Diagnostic Studies:     Procedure Component Value Units Date/Time    Phenytoin level, total [[id number]] Collected:  04/26/18 2251    Order Status:  Sent Lab Status:  In process Updated:  04/26/18 2251    Specimen:  Blood from Blood     Sodium [[id number]] Collected:  05/01/18 2356    Order Status:  Sent Lab Status:  In process Updated:  05/01/18 2356    Specimen:  Blood from Blood     Sodium [[id number]] Collected:  05/01/18 0047    Order Status:  Sent Lab Status:  In process Updated:  05/01/18 0048    Specimen:  Blood from Blood         Final Active Diagnoses:    Diagnosis Date Noted POA    PRINCIPAL PROBLEM:  Chronic subdural hematoma [I62.03] 04/26/2018 Yes    Brain herniation [G93.5] 04/27/2018 Yes    Headache [R51] 04/26/2018 Yes    HTN (hypertension) [I10] 04/12/2018 Yes    Hypothyroidism [E03.9] 04/07/2018 Yes    Brain compression [G93.5] 03/31/2018 Yes    Seizure after head injury [R56.1] 03/31/2018 Yes    Hyponatremia [E87.1] 03/31/2018 Yes      Problems Resolved During this Admission:    Diagnosis Date Noted Date Resolved POA      Discharged Condition: stable    Disposition: Home or Self Care    Follow Up: 2 weeks with Dr. Morataya     Patient Instructions:   No discharge procedures on file.  Medications:  Reconciled Home Medications:      Medication List      START taking these medications    oxyCODONE 5 MG immediate release tablet  Commonly known as:  ROXICODONE  Take 1 tablet (5 mg total) by mouth every 6 (six) hours as needed.     sodium chloride 1 gram tablet  Take 3 tablets (3 g total) by mouth 3 (three) times daily.        CONTINUE taking these medications    acetaminophen 325 MG tablet  Commonly known as:  TYLENOL  Take 2 tablets (650 mg total) by mouth every 6 (six) hours as needed.     amLODIPine 10 MG tablet  Commonly known as:  NORVASC  Take 10 mg by mouth once daily.     busPIRone 15 MG tablet  Commonly known as:  BUSPAR  Take 15 mg by  mouth 2 (two) times daily.     calcium-vitamin D3 500 mg(1,250mg) -200 unit per tablet  Take 1 tablet by mouth 2 (two) times daily with meals.     folic acid 1 MG tablet  Commonly known as:  FOLVITE  Take 1 tablet (1 mg total) by mouth once daily.     levothyroxine 75 MCG tablet  Commonly known as:  SYNTHROID  Take 75 mcg by mouth once daily.     metoprolol tartrate 25 MG tablet  Commonly known as:  LOPRESSOR  Take 50 mg by mouth 2 (two) times daily.     nicotine 21 mg/24 hr  Commonly known as:  NICODERM CQ  Place 1 patch onto the skin once daily.     phenytoin 100 MG ER capsule  Commonly known as:  DILANTIN  Take by mouth 3 (three) times daily.     thiamine 100 MG tablet  Take 1 tablet (100 mg total) by mouth once daily.     tiZANidine 4 MG tablet  Commonly known as:  ZANAFLEX  Take 4 mg by mouth 2 (two) times daily.        STOP taking these medications    citalopram 20 MG tablet  Commonly known as:  CELEXA     lisinopril 10 MG tablet     mirtazapine 30 MG tablet  Commonly known as:  REMERON     oxyCODONE-acetaminophen  mg per tablet  Commonly known as:  PERCOCET            Bonny Jimenez PA-C  Neurosurgery  [facility]

## 2018-05-10 ENCOUNTER — HOSPITAL ENCOUNTER (OUTPATIENT)
Dept: RADIOLOGY | Facility: HOSPITAL | Age: 53
Discharge: HOME | End: 2018-05-10
Attending: NEUROLOGICAL SURGERY
Payer: MEDICAID

## 2018-05-10 ENCOUNTER — OFFICE VISIT (OUTPATIENT)
Dept: NEUROSURGERY | Facility: CLINIC | Age: 53
End: 2018-05-10
Payer: MEDICAID

## 2018-05-10 VITALS
HEIGHT: 65 IN | TEMPERATURE: 98 F | SYSTOLIC BLOOD PRESSURE: 151 MMHG | HEART RATE: 73 BPM | WEIGHT: 126 LBS | BODY MASS INDEX: 20.99 KG/M2 | DIASTOLIC BLOOD PRESSURE: 87 MMHG

## 2018-05-10 DIAGNOSIS — I62.00 SUBDURAL HEMORRHAGE: ICD-10-CM

## 2018-05-10 DIAGNOSIS — Z98.890 POST-OPERATIVE STATE: ICD-10-CM

## 2018-05-10 DIAGNOSIS — S06.5XAA SUBDURAL HEMATOMA: Primary | ICD-10-CM

## 2018-05-10 PROCEDURE — 70450 CT HEAD/BRAIN W/O DYE: CPT | Mod: TC

## 2018-05-10 PROCEDURE — 99024 POSTOP FOLLOW-UP VISIT: CPT | Mod: ,,, | Performed by: NEUROLOGICAL SURGERY

## 2018-05-10 PROCEDURE — 70450 CT HEAD/BRAIN W/O DYE: CPT | Mod: 26,,, | Performed by: RADIOLOGY

## 2018-05-10 PROCEDURE — 99999 PR PBB SHADOW E&M-EST. PATIENT-LVL III: CPT | Mod: PBBFAC,,, | Performed by: NEUROLOGICAL SURGERY

## 2018-05-10 PROCEDURE — 99213 OFFICE O/P EST LOW 20 MIN: CPT | Mod: PBBFAC,25 | Performed by: NEUROLOGICAL SURGERY

## 2018-05-10 RX ORDER — OXYCODONE HYDROCHLORIDE 5 MG/1
5 TABLET ORAL EVERY 6 HOURS PRN
Qty: 60 TABLET | Refills: 0 | Status: SHIPPED | OUTPATIENT
Start: 2018-05-10

## 2018-05-10 NOTE — PROGRESS NOTES
This office note has been dictated.  Caridad Ortiz returned in neurosurgical followup to the office this afternoon.    She is a 53-year-old lady who presented with an acute subdural after a fall on   03/31/18.  This was a moderate size blood collection, but her only symptom was   headache and the clot remained stable over a week and she was discharged to   followup in clinic, however, she returned on 04/26/18 in routine followup and   the subdural hematoma was then subacute to chronic.  It had enlarged   considerably with considerable brain shift.  She was taken to the Operating Room   on 04/27/18 where the fluid collection was drained through two edmond holes.  She   did well in the postop period.  A CT scan on 05/01/18 showed considerable   resolution of the hematoma and she was discharged to home.  Since being home,   she has had some continued headache, but this has improved.  She has chronic low   back pain from previous spinal surgery and this has been limiting her also.    She returns today for staple removal.    On brief examination today, she is alert and cooperative.  Speech is clear.  She   has good extraocular movements.  She was able to arise from a chair and walk   without much difficulty, although this did cause pain in her low back.  Her   incisions are well healed and the staples were removed.    CT scan of the brain done today and compared to her studies of 05/01/08,   04/20/18 and 04/26/18 show considerable resolution.  There is only a small   amount of fluid left over the brain and no midline shift.    I am happy to see her doing well.  I will release her back to work for 05/14/18.    She works as a  at a [employer] service [employer].  Her prescription for   oxycodone was refilled today.  I would like to see her one more time in about   four weeks with a CT scan of the head.  If this is stable, she can be discharged   from neurosurgical care.      NATY/DION  dd: 05/10/2018 15:11:23 (CDT)  td:  05/11/2018 12:13:57 (CDT)  Doc ID   #[id number]  Job ID #[id number]    CC: Caridad Ortiz

## 2018-05-11 ENCOUNTER — TELEPHONE (OUTPATIENT)
Dept: NEUROSURGERY | Facility: CLINIC | Age: 53
End: 2018-05-11

## 2018-05-11 NOTE — TELEPHONE ENCOUNTER
DARWIN PT, PHARMACY WILL NOT FILL HER NEW Rx FOR 5 DAYS OUT. TOO EARLY TO REFILL. EXPLAINED THE LAW REGARDING NARCOTIC MEDICATIONS. SHE WILL TAKE TYLENOL FOR THE NEXT 5 DAYS. SHE CLEARLY UNDERSTANDS.

## 2018-05-11 NOTE — TELEPHONE ENCOUNTER
----- Message from Rob Randhawa sent at 5/11/2018  3:07 PM CDT -----  Contact: Patient @ [phone]  Patient would like to come in today to pickup a new script, pls call

## 2018-05-11 NOTE — TELEPHONE ENCOUNTER
sw pt, revisited prev message about Controlled substances & Drug enforcement rules. There is not a medication that Dr. Morataya can prescribe that will be able to be filled for the next 5 days per the pharmacy. Pt was taking more medication every 4-6 hrs when the sig reads every 6hrs. She is now aware of this.

## 2018-05-11 NOTE — TELEPHONE ENCOUNTER
----- Message from Zak Ellison sent at 5/11/2018 11:47 AM CDT -----  Contact: Pt   Pt would like to be called back regarding new pain medication       Pt can be reached at [phone].

## 2018-06-07 ENCOUNTER — TELEPHONE (OUTPATIENT)
Dept: NEUROSURGERY | Facility: CLINIC | Age: 53
End: 2018-06-07

## 2018-06-07 NOTE — TELEPHONE ENCOUNTER
DARWIN PT, HAS TRANSPORTATION PROBLEMS TODAY, RSLD 2 WKS OUT. NEW DATE & TIME IS OK WITH PT. NEW APPT SLIP IN THE MAIL.

## 2018-06-07 NOTE — TELEPHONE ENCOUNTER
----- Message from Bonnie Chery sent at 6/7/2018  7:37 AM CDT -----  Contact: Pt  Pt called to speak to the nurse to reschedule her canceled appt due to transportation.    Pt can be reached at [phone].    Thanks

## 2018-06-25 ENCOUNTER — OFFICE VISIT (OUTPATIENT)
Dept: NEUROSURGERY | Facility: CLINIC | Age: 53
End: 2018-06-25

## 2018-06-25 ENCOUNTER — HOSPITAL ENCOUNTER (OUTPATIENT)
Dept: RADIOLOGY | Facility: HOSPITAL | Age: 53
Discharge: HOME | End: 2018-06-25
Attending: PHYSICIAN ASSISTANT

## 2018-06-25 VITALS
WEIGHT: 125.69 LBS | HEART RATE: 67 BPM | DIASTOLIC BLOOD PRESSURE: 74 MMHG | SYSTOLIC BLOOD PRESSURE: 119 MMHG | BODY MASS INDEX: 20.92 KG/M2 | TEMPERATURE: 99 F

## 2018-06-25 DIAGNOSIS — R25.1 TREMOR: ICD-10-CM

## 2018-06-25 DIAGNOSIS — S06.5XAA SUBDURAL HEMATOMA: Primary | ICD-10-CM

## 2018-06-25 DIAGNOSIS — I62.00 SUBDURAL HEMORRHAGE: ICD-10-CM

## 2018-06-25 PROCEDURE — 70450 CT HEAD/BRAIN W/O DYE: CPT | Mod: TC

## 2018-06-25 PROCEDURE — 70450 CT HEAD/BRAIN W/O DYE: CPT | Mod: 26,,, | Performed by: RADIOLOGY

## 2018-06-25 PROCEDURE — 99024 POSTOP FOLLOW-UP VISIT: CPT | Mod: ,,, | Performed by: NEUROLOGICAL SURGERY

## 2018-06-25 PROCEDURE — 99999 PR PBB SHADOW E&M-EST. PATIENT-LVL III: CPT | Mod: PBBFAC,,, | Performed by: NEUROLOGICAL SURGERY

## 2018-06-25 PROCEDURE — 99213 OFFICE O/P EST LOW 20 MIN: CPT | Mod: PBBFAC,25 | Performed by: NEUROLOGICAL SURGERY

## 2018-06-25 RX ORDER — GABAPENTIN 400 MG/1
CAPSULE ORAL
Refills: 2 | COMMUNITY
Start: 2018-06-06

## 2018-06-25 RX ORDER — PROPRANOLOL HYDROCHLORIDE 40 MG/1
40 TABLET ORAL 2 TIMES DAILY
Qty: 60 TABLET | Refills: 2 | Status: SHIPPED | OUTPATIENT
Start: 2018-06-25

## 2018-06-25 NOTE — LETTER
June 25, 2018      Kim Angiuano, [address]  [place] [address]           [facility] Neuro[facility]  [address]  [place] [address]  Phone: [phone]          Patient: Caridad Ortiz   MR Number: [id number]   YOB: 1965   Date of Visit: 6/25/2018       Dear Dr. Kim Anguiano:    Thank you for referring Caridad Ortiz to me for evaluation. Attached you will find relevant portions of my assessment and plan of care.    If you have questions, please do not hesitate to call me. I look forward to following Caridad Ortiz along with you.    Sincerely,    Negro Morataya MD    Enclosure  CC:  No Recipients    If you would like to receive this communication electronically, please contact [email or web address] or [phone] to request more information on [facility] [employer] access.    For providers and/or their staff who would like to refer a patient to [facility], please contact us through our one-stop-shop provider referral line, [facility], at [phone].    If you feel you have received this communication in error or would no longer like to receive these types of communications, please e-mail [email or web address]

## 2018-06-25 NOTE — PROGRESS NOTES
This office note has been dictated.  Caridad Ortiz was seen in neurosurgical followup at the office this morning.    She has been getting along reasonably well since I last saw her.  She does   continue with headaches.  She also has noted increasing fine tremor of her head.    This is particularly noticeable when she tries to put eyeliner or makeup on.    She is fully back at work.    On brief examination, she is bright and alert and speaking well.  She showed me   how the head tremor is brought on by bringing something close to her face.  She   does not have tremor of the outstretched extremities and finger-to-nose is done   well.  Gait is unremarkable and she seems otherwise neurologically intact.    CT scan of the brain done this morning shows further resolution of the subdural   fluid.  There is only minimal fluid at this point with no compression of the   brain or midline shift.    She has a history of hypothyroidism and has been taking thyroid.  She says that   this has not changed.  Thyroid status could relate to mild tremor and some   weight loss.  This can be followed up with her primary care provider.  For   today, I will give her a trial of propranolol and see if this will help with the   head tremor.  She may need Neurology followup in the future.  I will plan to   see her back as needed.            NATY/IN  dd: 06/25/2018 12:10:03 (CDT)  td: 06/25/2018 23:22:24 (CDT)  Doc ID   #[id number]  Job ID #[id number]    CC: Caridad Ortiz

## 2022-06-21 NOTE — HPI
Caridad Ortiz is a  52y/o F w/ hx seizures on dilantin, recent head trauma end of March w/ R subdural, HTN, hypothyroidism  Who presents to [facility] for enlarging SDH. She was sent from neurosurgery clinic for large R SDH. Patient says since discharge she has been having gradually worsening headache w/ nausea and malaise. After clinic visit today had outpatient CT scan that showed SDH had increased from 7mm to 2.1 cm w/ 1cm leftward mass effect and possible signs of subfalcine herniation. She was advised to present emergently to ED for evacuation. She is being admitted to [facility] for a higher level of care.   
Caridad Ortiz is a 53 y.o. year old female with known seizure disorder,HTN,and R convexity chronic SDH s/p seizure-related fall a few weeks ago. She was discharged 2 weeks ago.She came today for follow up CTH. CT head shows evolving chronic SDH, max thicknesses 2 cm and 8 mm MLS.She was sent to the ED for neurosurgical evaluation. She repots worsening R sided headache since discharge. She denies any AMS, weakness, numbness, speech or visual difficulties or seizure.She denies any antiplatelets/anticoagulants agents  
[id number]

## (undated) DEVICE — TUBE FRAZIER 5MM 2FT SOFT TIP

## (undated) DEVICE — DRESSING SURGICAL 1X1

## (undated) DEVICE — ROUTER TAPERED 2.3MM

## (undated) DEVICE — ADHESIVE MASTISOL VIAL 48/BX

## (undated) DEVICE — SUT 4/0 18IN NUROLON BLK B

## (undated) DEVICE — DRESSING SURGICAL 1X3

## (undated) DEVICE — MARKER SKIN STND TIP BLUE BARR

## (undated) DEVICE — CARTRIDGE OIL

## (undated) DEVICE — DIFFUSER

## (undated) DEVICE — KIT EVACUATOR FULL PERF 100CC

## (undated) DEVICE — DRESSING SURGICAL 3/4X3/4

## (undated) DEVICE — HEMOSTAT SURGICEL 4X8IN

## (undated) DEVICE — ELECTRODE REM PLYHSV RETURN 9

## (undated) DEVICE — ROUTER STRAIGHT 1.1 X6.0MM

## (undated) DEVICE — DRESSING TELFA PAD N ADH 2X3

## (undated) DEVICE — DRAPE INCISE IOBAN 2 23X17IN

## (undated) DEVICE — BUR BONE CUT MICRO TPS 3X3.8MM

## (undated) DEVICE — DRAPE STERI INSTRUMENT 1018

## (undated) DEVICE — WARMER DRAPE STERILE LF

## (undated) DEVICE — DRESSING SURGICAL 1/2X1/2

## (undated) DEVICE — DRESSING TELFA STRL 4X3 LF

## (undated) DEVICE — SEE MEDLINE ITEM 157131

## (undated) DEVICE — CLIPS RANEY SCALP FFS/ASSY

## (undated) DEVICE — STAPLER SKIN PROXIMATE WIDE

## (undated) DEVICE — HOOK STAY ELAS 5MM 8EA/PK

## (undated) DEVICE — SEE MEDLINE ITEM 156905

## (undated) DEVICE — BIT DRILL PERFORATOR DISP 14MM

## (undated) DEVICE — KIT EVACUATOR 3-SPRING 1/8 DRN

## (undated) DEVICE — TAPE SURG MEDIPORE 6X72IN

## (undated) DEVICE — DRAPE THYROID WITH ARMBOARD

## (undated) DEVICE — OXICLIQ ADULT 24/CASE

## (undated) DEVICE — CORD BIPOLAR 12 FOOT

## (undated) DEVICE — BLADE SURG CARBON STEEL SZ11

## (undated) DEVICE — TRAY FOLEY 16FR INFECTION CONT

## (undated) DEVICE — SUT VICRYL PLUS 3-0 SH 18IN